# Patient Record
Sex: FEMALE | Race: WHITE | NOT HISPANIC OR LATINO | Employment: UNEMPLOYED | URBAN - METROPOLITAN AREA
[De-identification: names, ages, dates, MRNs, and addresses within clinical notes are randomized per-mention and may not be internally consistent; named-entity substitution may affect disease eponyms.]

---

## 2017-10-19 ENCOUNTER — OFFICE VISIT (OUTPATIENT)
Dept: FAMILY MEDICINE | Facility: CLINIC | Age: 22
End: 2017-10-19
Payer: COMMERCIAL

## 2017-10-19 VITALS
HEART RATE: 114 BPM | RESPIRATION RATE: 16 BRPM | TEMPERATURE: 98 F | SYSTOLIC BLOOD PRESSURE: 114 MMHG | BODY MASS INDEX: 27.82 KG/M2 | HEIGHT: 63 IN | DIASTOLIC BLOOD PRESSURE: 80 MMHG | WEIGHT: 157 LBS | OXYGEN SATURATION: 100 %

## 2017-10-19 DIAGNOSIS — F41.0 PANIC ATTACK: ICD-10-CM

## 2017-10-19 DIAGNOSIS — Z00.00 ROUTINE GENERAL MEDICAL EXAMINATION AT A HEALTH CARE FACILITY: Primary | ICD-10-CM

## 2017-10-19 DIAGNOSIS — F41.1 GAD (GENERALIZED ANXIETY DISORDER): ICD-10-CM

## 2017-10-19 DIAGNOSIS — Z11.3 SCREEN FOR STD (SEXUALLY TRANSMITTED DISEASE): ICD-10-CM

## 2017-10-19 DIAGNOSIS — F33.2 SEVERE EPISODE OF RECURRENT MAJOR DEPRESSIVE DISORDER, WITHOUT PSYCHOTIC FEATURES (H): ICD-10-CM

## 2017-10-19 DIAGNOSIS — Z12.4 SCREENING FOR CERVICAL CANCER: ICD-10-CM

## 2017-10-19 LAB
SPECIMEN SOURCE: NORMAL
WET PREP SPEC: NORMAL

## 2017-10-19 PROCEDURE — 87591 N.GONORRHOEAE DNA AMP PROB: CPT | Performed by: PHYSICIAN ASSISTANT

## 2017-10-19 PROCEDURE — 87491 CHLMYD TRACH DNA AMP PROBE: CPT | Performed by: PHYSICIAN ASSISTANT

## 2017-10-19 PROCEDURE — G0145 SCR C/V CYTO,THINLAYER,RESCR: HCPCS | Performed by: PHYSICIAN ASSISTANT

## 2017-10-19 PROCEDURE — 87210 SMEAR WET MOUNT SALINE/INK: CPT | Performed by: PHYSICIAN ASSISTANT

## 2017-10-19 PROCEDURE — 86706 HEP B SURFACE ANTIBODY: CPT | Performed by: PHYSICIAN ASSISTANT

## 2017-10-19 PROCEDURE — 86780 TREPONEMA PALLIDUM: CPT | Performed by: PHYSICIAN ASSISTANT

## 2017-10-19 PROCEDURE — 99385 PREV VISIT NEW AGE 18-39: CPT | Performed by: PHYSICIAN ASSISTANT

## 2017-10-19 PROCEDURE — 36415 COLL VENOUS BLD VENIPUNCTURE: CPT | Performed by: PHYSICIAN ASSISTANT

## 2017-10-19 PROCEDURE — 87389 HIV-1 AG W/HIV-1&-2 AB AG IA: CPT | Performed by: PHYSICIAN ASSISTANT

## 2017-10-19 PROCEDURE — 86803 HEPATITIS C AB TEST: CPT | Performed by: PHYSICIAN ASSISTANT

## 2017-10-19 ASSESSMENT — ANXIETY QUESTIONNAIRES
6. BECOMING EASILY ANNOYED OR IRRITABLE: NEARLY EVERY DAY
1. FEELING NERVOUS, ANXIOUS, OR ON EDGE: NEARLY EVERY DAY
5. BEING SO RESTLESS THAT IT IS HARD TO SIT STILL: NEARLY EVERY DAY
2. NOT BEING ABLE TO STOP OR CONTROL WORRYING: NEARLY EVERY DAY
IF YOU CHECKED OFF ANY PROBLEMS ON THIS QUESTIONNAIRE, HOW DIFFICULT HAVE THESE PROBLEMS MADE IT FOR YOU TO DO YOUR WORK, TAKE CARE OF THINGS AT HOME, OR GET ALONG WITH OTHER PEOPLE: EXTREMELY DIFFICULT
3. WORRYING TOO MUCH ABOUT DIFFERENT THINGS: NEARLY EVERY DAY
GAD7 TOTAL SCORE: 21
7. FEELING AFRAID AS IF SOMETHING AWFUL MIGHT HAPPEN: NEARLY EVERY DAY

## 2017-10-19 ASSESSMENT — PATIENT HEALTH QUESTIONNAIRE - PHQ9
5. POOR APPETITE OR OVEREATING: NEARLY EVERY DAY
SUM OF ALL RESPONSES TO PHQ QUESTIONS 1-9: 26

## 2017-10-19 NOTE — LETTER
October 24, 2017      Shona Aguilar  [address] [address]        Dear ,    We are writing to inform you of your test results.    Your recent STD screening was negative for Gonorrhea, Chlamydia, Trichomonas, HIV, Syphilis, Hepatitis C and Hepatitis B. You are also not immune to Hepatitis B and there is an immunization for that particular virus if you' d like to complete the shots.     Resulted Orders   Chlamydia trachomatis PCR   Result Value Ref Range    Specimen Description Cervix     Chlamydia Trachomatis PCR Negative NEG^Negative      Comment:      Negative for C. trachomatis rRNA by transcription mediated amplification.  A negative result by transcription mediated amplification does not preclude   the presence of C. trachomatis infection because results are dependent on   proper and adequate collection, absence of inhibitors, and sufficient rRNA to   be detected.     Neisseria gonorrhoeae PCR   Result Value Ref Range    Specimen Descrip Cervix     N Gonorrhea PCR Negative NEG^Negative      Comment:      Negative for N. gonorrhoeae rRNA by transcription mediated amplification.  A negative result by transcription mediated amplification does not preclude   the presence of N. gonorrhoeae infection because results are dependent on   proper and adequate collection, absence of inhibitors, and sufficient rRNA to   be detected.     Wet prep   Result Value Ref Range    Specimen Description Vagina     Wet Prep No Trichomonas seen     Wet Prep No clue cells seen     Wet Prep No yeast seen    HIV Antigen Antibody Combo   Result Value Ref Range    HIV Antigen Antibody Combo Nonreactive NR^Nonreactive          Comment:      HIV-1 p24 Ag & HIV-1/HIV-2 Ab Not Detected   Hepatitis C Screen Reflex to HCV RNA Quant and Genotype   Result Value Ref Range    Hepatitis C Antibody Nonreactive NR^Nonreactive      Comment:      Assay performance characteristics have not been established for newborns,    infants, and children     Hepatitis B Surface Antibody   Result Value Ref Range    Hepatitis B Surface Antibody 1.09 <8.00 m[IU]/mL      Comment:      Nonreactive, No antibody detected when the value is less than 8.00 m[IU]/mL.   Anti Treponema   Result Value Ref Range    Treponema pallidum Antibody Negative NEG^Negative       If you have any questions or concerns, please call the clinic at the number listed above.       Sincerely,        Nicole Joy Siegler, PA-C

## 2017-10-19 NOTE — LETTER
October 25, 2017      Shona Aguilar  [address] [address]    Dear ,      I am happy to inform you that your recent cervical cancer screening test (PAP smear) was normal.      Preventative screenings such as this help to ensure your health for years to come. You should repeat a pap smear in 3 years, unless otherwise directed.      You will still need to return to the clinic every year for your annual exam and other preventive tests.     Please contact the clinic at [phone] if you have further questions.       Sincerely,      Nicole Joy Siegler, PA-C/erik

## 2017-10-19 NOTE — NURSING NOTE
"Chief Complaint   Patient presents with     Physical       Initial /80  Pulse 114  Temp 98  F (36.7  C)  Resp 16  Ht 5' 3\" (1.6 m)  Wt 157 lb (71.2 kg)  SpO2 100%  BMI 27.81 kg/m2 Estimated body mass index is 27.81 kg/(m^2) as calculated from the following:    Height as of this encounter: 5' 3\" (1.6 m).    Weight as of this encounter: 157 lb (71.2 kg).  Medication Reconciliation: estuardo Hathaway CMA      "

## 2017-10-19 NOTE — PROGRESS NOTES
SUBJECTIVE:   CC: Shona Aguilar is an 22 year old woman who presents for preventive health visit.     Healthy Habits:    Do you get at least three servings of calcium containing foods daily (dairy, green leafy vegetables, etc.)? yes    Amount of exercise or daily activities, outside of work: 2 day(s) per week    Problems taking medications regularly not applicable    Medication side effects: No    Have you had an eye exam in the past two years? yes    Do you see a dentist twice per year? no    Do you have sleep apnea, excessive snoring or daytime drowsiness?no  Physical       Abnormal Mood Symptoms      Duration: ongoing for a few years off and on; recent increase in symptoms in the past few weeks.     Description:  Depression: YES  Anxiety: YES  Panic attacks: YES     Accompanying signs and symptoms: see PHQ-9 and PABLO scores    History (similar episodes/previous evaluation): hx includes treatment with medication for anxiety and depression a few years ago for a few months, then she stopped due to not liking how it made her feel. She had to leave school due to anxiety. She is currently not taking medication but symptoms have been bad enough that she is taking a leave of absence from work due to them.     Precipitating or alleviating factors: None    Therapies tried and outcome: she is unsure what she has taken.     Her PHQ 9 and PABLO 7 scores are suggestive of severe symptoms. She has seen a therapist in the past but is not currently treated. She denies hospitalization in the past for behavioral health concerns.       Today's PHQ-2 Score:   PHQ-2 ( 1999 [employer]) 10/19/2017   Q1: Little interest or pleasure in doing things Nearly every day   Q2: Feeling down, depressed or hopeless Nearly every day   PHQ-2 Score 6       Abuse: Current or Past(Physical, Sexual or Emotional)- No  Do you feel safe in your environment - Yes    Social History   Substance Use Topics     Smoking status: Never Smoker     Smokeless tobacco:  "Never Used     Alcohol use Yes     The patient does not drink >3 drinks per day nor >7 drinks per week.    Reviewed orders with patient.  Reviewed health maintenance and updated orders accordingly - Yes    Mammogram not appropriate for this patient based on age.    Pertinent mammograms are reviewed under the imaging tab.  History of abnormal Pap smear: NO - age 21-29 PAP every 3 years recommended. She is 22 years old. This will be her first pelvic exam and she is very nervous.   Patient is sexually active with 2 male partners. She is , they are polyamorous and have several partners between them.     Reviewed and updated as needed this visit by clinical staff  Tobacco  Allergies  Med Hx  Surg Hx  Fam Hx  Soc Hx        Reviewed and updated as needed this visit by Provider          Review of Systems  C: NEGATIVE for fever, chills, change in weight  I: NEGATIVE for worrisome rashes, moles or lesions  E: NEGATIVE for vision changes or irritation  ENT: NEGATIVE for ear, mouth and throat problems  R: NEGATIVE for significant cough or SOB  B: NEGATIVE for masses, tenderness or discharge  CV: NEGATIVE for chest pain, palpitations or peripheral edema  GI: NEGATIVE for nausea, abdominal pain, heartburn, or change in bowel habits  : NEGATIVE for unusual urinary or vaginal symptoms. Periods are regular.  M: NEGATIVE for significant arthralgias or myalgia  N: NEGATIVE for weakness, dizziness or paresthesias     OBJECTIVE:   /80  Pulse 114  Temp 98  F (36.7  C)  Resp 16  Ht 5' 3\" (1.6 m)  Wt 157 lb (71.2 kg)  SpO2 100%  BMI 27.81 kg/m2  Physical Exam  GENERAL: healthy, alert and no distress  EYES: Eyes grossly normal to inspection, PERRL and conjunctivae and sclerae normal  HENT: ear canals and TM's normal, nose and mouth without ulcers or lesions  NECK: no adenopathy, no asymmetry, masses, or scars and thyroid normal to palpation  RESP: lungs clear to auscultation - no rales, rhonchi or wheezes  BREAST: " normal without masses, tenderness or nipple discharge and no palpable axillary masses or adenopathy  CV: regular rate and rhythm, normal S1 S2, no S3 or S4, no murmur, click or rub, no peripheral edema and peripheral pulses strong  ABDOMEN: soft, nontender, no hepatosplenomegaly, no masses and bowel sounds normal   (female): normal female external genitalia, normal urethral meatus, vaginal mucosa pink, moist, well rugated, and normal cervix/adnexa/uterus without masses or discharge  MS: no gross musculoskeletal defects noted, no edema  SKIN: no suspicious lesions or rashes  NEURO: Normal strength and tone, mentation intact and speech normal  PSYCH: mentation appears normal, affect normal/bright    ASSESSMENT/PLAN:       ICD-10-CM    1. Routine general medical examination at a health care facility Z00.00    2. Screening for cervical cancer Z12.4    3. Screen for STD (sexually transmitted disease) Z11.3 Chlamydia trachomatis PCR     Neisseria gonorrhoeae PCR     Wet prep     Pap imaged thin layer screen only - recommended age 21 - 24 years     HIV Antigen Antibody Combo     Hepatitis C Screen Reflex to HCV RNA Quant and Genotype     Hepatitis B Surface Antibody     Anti Treponema   4. Severe episode of recurrent major depressive disorder, without psychotic features (H) F33.2    5. Panic attack F41.0    6. PABLO (generalized anxiety disorder) F41.1      We discussed screening tests above and anticipated return of test dates.   We discussed her mood symptoms; I recommended she return within a few days for further evaluation as her physical took longer than usual due to discussion of procedures as this was her first pelvic examination.   She agreed that she was not stable enough emotionally to be seen within a few days. She will also bring her medication bottles from her previous treatment so we can see what she had been on and what did or didn't work for her in the past before prescribing medication.     She has made a  "follow up for tomorrow to discuss her behavioral health symptoms and create a complete plan of care.     COUNSELING:  Reviewed preventive health counseling, as reflected in patient instructions         reports that she has never smoked. She has never used smokeless tobacco.    Estimated body mass index is 27.81 kg/(m^2) as calculated from the following:    Height as of this encounter: 5' 3\" (1.6 m).    Weight as of this encounter: 157 lb (71.2 kg).       Nicole Joy Siegler, PA-C  [facility]  "

## 2017-10-19 NOTE — MR AVS SNAPSHOT
After Visit Summary   10/19/2017    Shona Aguilar    MRN: [id number]           Patient Information     Date Of Birth          1995        Visit Information        Provider Department      10/19/2017 2:10 PM Siegler, Nicole Joy, PA-C [facility]        Today's Diagnoses     Routine general medical examination at a health care facility    -  1    Screening for cervical cancer        Screen for STD (sexually transmitted disease)          Care Instructions      Preventive Health Recommendations  Female Ages 18 to 25     Yearly exam:     See your health care provider every year in order to  o Review health changes.   o Discuss preventive care.    o Review your medicines if your doctor has prescribed any.      You should be tested each year for STDs (sexually transmitted diseases).       After age 20, talk to your provider about how often you should have cholesterol testing.      Starting at age 21, get a Pap test every three years. If you have an abnormal result, your doctor may have you test more often.      If you are at risk for diabetes, you should have a diabetes test (fasting glucose).     Shots:     Get a flu shot each year.     Get a tetanus shot every 10 years.     Consider getting the shot (vaccine) that prevents cervical cancer (Gardasil).    Nutrition:     Eat at least 5 servings of fruits and vegetables each day.    Eat whole-grain bread, whole-wheat pasta and brown rice instead of white grains and rice.    Talk to your provider about Calcium and Vitamin D.     Lifestyle    Exercise at least 150 minutes a week each week (30 minutes a day, 5 days a week). This will help you control your weight and prevent disease.    Limit alcohol to one drink per day.    No smoking.     Wear sunscreen to prevent skin cancer.    See your dentist every six months for an exam and cleaning.  Preventive Health Recommendations  Female Ages 18 to 25     Yearly exam:   See your  health care provider every year in order to  Review health changes.   Discuss preventive care.    Review your medicines if your doctor has prescribed any.    You should be tested each year for STDs (sexually transmitted diseases).     After age 20, talk to your provider about how often you should have cholesterol testing.    Starting at age 21, get a Pap test every three years. If you have an abnormal result, your doctor may have you test more often.    If you are at risk for diabetes, you should have a diabetes test (fasting glucose).     Shots:   Get a flu shot each year.   Get a tetanus shot every 10 years.   Consider getting the shot (vaccine) that prevents cervical cancer (Gardasil).    Nutrition:   Eat at least 5 servings of fruits and vegetables each day.  Eat whole-grain bread, whole-wheat pasta and brown rice instead of white grains and rice.  Talk to your provider about Calcium and Vitamin D.     Lifestyle  Exercise at least 150 minutes a week each week (30 minutes a day, 5 days a week). This will help you control your weight and prevent disease.  Limit alcohol to one drink per day.  No smoking.   Wear sunscreen to prevent skin cancer.  See your dentist every six months for an exam and cleaning.          Follow-ups after your visit        Who to contact     If you have questions or need follow up information about today's clinic visit or your schedule please contact [facility] directly at [phone].  Normal or non-critical lab and imaging results will be communicated to you by MyChart, letter or phone within 4 business days after the clinic has received the results. If you do not hear from us within 7 days, please contact the clinic through MyChart or phone. If you have a critical or abnormal lab result, we will notify you by phone as soon as possible.  Submit refill requests through [employer] or call your pharmacy and they will forward the refill request to us. Please  "allow 3 business days for your refill to be completed.          Additional Information About Your Visit        MyChart Information     [facility] lets you send messages to your doctor, view your test results, renew your prescriptions, schedule appointments and more. To sign up, go to [email or web address] . Click on \"Log in\" on the left side of the screen, which will take you to the Welcome page. Then click on \"Sign up Now\" on the right side of the page.     You will be asked to enter the access code listed below, as well as some personal information. Please follow the directions to create your username and password.     Your access code is: [email or web address]  Expires: 2018  3:11 PM     Your access code will  in 90 days. If you need help or a new code, please call your [facility] clinic or [phone].        Care EveryWhere ID     This is your Care EveryWhere ID. This could be used by other organizations to access your [facility] medical records  [id number]        Your Vitals Were     Pulse Temperature Respirations Height Pulse Oximetry BMI (Body Mass Index)    114 98  F (36.7  C) 16 5' 3\" (1.6 m) 100% 27.81 kg/m2       Blood Pressure from Last 3 Encounters:   10/19/17 114/80   03/29/15 111/72    Weight from Last 3 Encounters:   10/19/17 157 lb (71.2 kg)   03/29/15 160 lb (72.6 kg) (88 %)*     * Growth percentiles are based on CDC 2-20 Years data.              We Performed the Following     Anti Treponema     Chlamydia trachomatis PCR     Hepatitis B Surface Antibody     Hepatitis C Screen Reflex to HCV RNA Quant and Genotype     HIV Antigen Antibody Combo     Neisseria gonorrhoeae PCR     Pap imaged thin layer screen only - recommended age 21 - 24 years     Wet prep        Primary Care Provider Office Phone # Fax #    [facility] [phone] [address], [address]  [place] [facility] [address]        Equal Access to Services     TIM PHILIP AH: Coty holley " abigail Hassan, maryjo castañedaadaha, qaoliviata kageorge jennbunny, devon cecillein hayaan jennpapa paramnoemi ladyllanshauna corky. So [facility] [phone].    ATENCIÓN: Si habla español, tiene a dick disposición servicios gratuitos de asistencia lingüística. Candelario al [phone].    We comply with applicable federal civil rights laws and [place] laws. We do not discriminate on the basis of race, color, national origin, age, disability, sex, sexual orientation, or gender identity.            Thank you!     Thank you for choosing [facility]  for your care. Our goal is always to provide you with excellent care. Hearing back from our patients is one way we can continue to improve our services. Please take a few minutes to complete the written survey that you may receive in the mail after your visit with us. Thank you!             Your Updated Medication List - Protect others around you: Learn how to safely use, store and throw away your medicines at www.disposemymeds.org.          This list is accurate as of: 10/19/17  3:11 PM.  Always use your most recent med list.                   Brand Name Dispense Instructions for use Diagnosis    IBUPROFEN PO      Take 600 mg by mouth

## 2017-10-20 ENCOUNTER — OFFICE VISIT (OUTPATIENT)
Dept: FAMILY MEDICINE | Facility: CLINIC | Age: 22
End: 2017-10-20
Payer: COMMERCIAL

## 2017-10-20 VITALS
DIASTOLIC BLOOD PRESSURE: 60 MMHG | BODY MASS INDEX: 27.82 KG/M2 | WEIGHT: 157 LBS | HEART RATE: 79 BPM | SYSTOLIC BLOOD PRESSURE: 110 MMHG | OXYGEN SATURATION: 99 % | TEMPERATURE: 96.2 F | RESPIRATION RATE: 14 BRPM | HEIGHT: 63 IN

## 2017-10-20 DIAGNOSIS — F41.0 PANIC ATTACK: ICD-10-CM

## 2017-10-20 DIAGNOSIS — F41.1 GAD (GENERALIZED ANXIETY DISORDER): ICD-10-CM

## 2017-10-20 DIAGNOSIS — F33.2 SEVERE EPISODE OF RECURRENT MAJOR DEPRESSIVE DISORDER, WITHOUT PSYCHOTIC FEATURES (H): Primary | ICD-10-CM

## 2017-10-20 LAB
HBV SURFACE AB SERPL IA-ACNC: 1.09 M[IU]/ML
HCV AB SERPL QL IA: NONREACTIVE
HIV 1+2 AB+HIV1 P24 AG SERPL QL IA: NONREACTIVE
T PALLIDUM IGG+IGM SER QL: NEGATIVE

## 2017-10-20 PROCEDURE — 99215 OFFICE O/P EST HI 40 MIN: CPT | Performed by: PHYSICIAN ASSISTANT

## 2017-10-20 RX ORDER — ESCITALOPRAM OXALATE 20 MG/1
20 TABLET ORAL DAILY
Qty: 30 TABLET | Refills: 1 | Status: ON HOLD | OUTPATIENT
Start: 2017-10-20 | End: 2017-10-24

## 2017-10-20 ASSESSMENT — ANXIETY QUESTIONNAIRES: GAD7 TOTAL SCORE: 21

## 2017-10-20 NOTE — LETTER
[facility]  [address]  [place] [address]  Phone: [phone]  Fax: [phone]    October 20, 2017        Shona Aguilar  [address] [address]          To whom it may concern:    RE: Shona Aguilar    Patient was seen and treated today at our clinic today. She was started on medication today.     Please excuse her from work for one week with follow up on 10/27/2017 for recheck.     Please contact me for questions or concerns.      Sincerely,        Nicole Joy Siegler, PA-C

## 2017-10-20 NOTE — NURSING NOTE
"Chief Complaint   Patient presents with     RECHECK     /60  Pulse 79  Temp 96.2  F (35.7  C) (Tympanic)  Resp 14  Ht 5' 3\" (1.6 m)  Wt 157 lb (71.2 kg)  LMP  (LMP Unknown)  SpO2 99%  Breastfeeding? No  BMI 27.81 kg/m2 Estimated body mass index is 27.81 kg/(m^2) as calculated from the following:    Height as of this encounter: 5' 3\" (1.6 m).    Weight as of this encounter: 157 lb (71.2 kg).  BP completed using cuff size: regular   Kamilla Garner CMA    Health Maintenance Due   Topic Date Due     PEDS DTAP/TDAP (1 - Tdap) 02/20/2003     TETANUS IMMUNIZATION (SYSTEM ASSIGNED)  02/20/2014     INFLUENZA VACCINE (SYSTEM ASSIGNED)  09/01/2017     Health Maintenance reviewed at today's visit patient asked to schedule/complete:   Depression:  Patient agrees to schedule  Immunizations:  Patient agrees to schedule    "

## 2017-10-20 NOTE — MR AVS SNAPSHOT
After Visit Summary   10/20/2017    Shona Aguilar    MRN: [id number]           Patient Information     Date Of Birth          1995        Visit Information        Provider Department      10/20/2017 9:50 AM Siegler, Nicole Joy, PA-C [facility]        Today's Diagnoses     Severe episode of recurrent major depressive disorder, without psychotic features (H)    -  1    PABLO (generalized anxiety disorder)        Panic attack          Care Instructions    1) get in touch with your HR department and get me any forms that I need to fill out  2) start the lexapro; we discussed possible side effects and let me know if anything like that occurs  3) return in one week for follow up  4) call and start therapy with the referral provided or an outside location of your choice          Follow-ups after your visit        Additional Services     MENTAL HEALTH REFERRAL       Your provider has referred you to:   FM[facility]: [facility] - Counseling (Individual/Couples/Family) - [facility] [place] [phone]   [email or web address]   *Patient will be contacted by [facility] scheduling partner[facility]), to schedule an appointment.  Patients may also call [facility] to schedule.    All scheduling is subject to the client's specific insurance plan & benefits, provider/location availability, and provider clinical specialities.  Please arrive 15 minutes early for your first appointment and bring your completed paperwork.    Please be aware that coverage of these services is subject to the terms and limitations of your health insurance plan.  Call member services at your health plan with any benefit or coverage questions.                  Who to contact     If you have questions or need follow up information about today's clinic visit or your schedule please contact [facility]  "directly at [phone].  Normal or non-critical lab and imaging results will be communicated to you by Mipagarhart, letter or phone within 4 business days after the clinic has received the results. If you do not hear from us within 7 days, please contact the clinic through Mipagarhart or phone. If you have a critical or abnormal lab result, we will notify you by phone as soon as possible.  Submit refill requests through [employer] or call your pharmacy and they will forward the refill request to us. Please allow 3 business days for your refill to be completed.          Additional Information About Your Visit        MipagarharMy Health Direct Information     [employer] lets you send messages to your doctor, view your test results, renew your prescriptions, schedule appointments and more. To sign up, go to [email or web address] . Click on \"Log in\" on the left side of the screen, which will take you to the Welcome page. Then click on \"Sign up Now\" on the right side of the page.     You will be asked to enter the access code listed below, as well as some personal information. Please follow the directions to create your username and password.     Your access code is: [email or web address]  Expires: 2018  3:11 PM     Your access code will  in 90 days. If you need help or a new code, please call your [place] clinic or [phone].        Care EveryWhere ID     This is your Care EveryWhere ID. This could be used by other organizations to access your [place] medical records  [id number]        Your Vitals Were     Pulse Temperature Respirations Height Last Period Pulse Oximetry    79 96.2  F (35.7  C) (Tympanic) 14 5' 3\" (1.6 m) (LMP Unknown) 99%    Breastfeeding? BMI (Body Mass Index)                No 27.81 kg/m2           Blood Pressure from Last 3 Encounters:   10/20/17 110/60   10/19/17 114/80   03/29/15 111/72    Weight from Last 3 Encounters:   10/20/17 157 lb (71.2 kg)   10/19/17 157 lb (71.2 kg)   03/29/15 160 lb (72.6 kg) (88 %)*     * " Growth percentiles are based on [facility] 2-20 Years data.              We Performed the Following     DEPRESSION ACTION PLAN (DAP)     MENTAL HEALTH REFERRAL          Today's Medication Changes          These changes are accurate as of: 10/20/17 10:45 AM.  If you have any questions, ask your nurse or doctor.               Start taking these medicines.        Dose/Directions    escitalopram 20 MG tablet   Commonly known as:  LEXAPRO   Used for:  Severe episode of recurrent major depressive disorder, without psychotic features (H), PABLO (generalized anxiety disorder), Panic attack   Started by:  Siegler, Nicole Joy, PA-C        Dose:  20 mg   Take 1 tablet (20 mg) by mouth daily Start with 1/2 tab for 2 weeks, then increase to 1 tab.   Quantity:  30 tablet   Refills:  1            Where to get your medicines      These medications were sent to [facility] [address] [address]     Phone:  [phone]     escitalopram 20 MG tablet                Primary Care Provider Office Phone # Fax #    [facility] [phone]       [address] [place] [address], [address]  [facility] [id number]        Equal Access to Services     TIM PHILIP : Hadii kalani ku hadasho Soomaali, waaxda luqadaha, qaybta kaalmada adeegyada, devon vogel hayjanes maldonado . So [facility] [phone].    ATENCIÓN: Si habla español, tiene a dick disposición servicios gratuitos de asistencia lingüística. Davidame al [phone].    We comply with applicable federal civil rights laws and [place] laws. We do not discriminate on the basis of race, color, national origin, age, disability, sex, sexual orientation, or gender identity.            Thank you!     Thank you for choosing [facility]  for your care. Our goal is always to provide you with excellent care. Hearing back from our patients is one way we can continue to  improve our services. Please take a few minutes to complete the written survey that you may receive in the mail after your visit with us. Thank you!             Your Updated Medication List - Protect others around you: Learn how to safely use, store and throw away your medicines at www.disposemymeds.org.          This list is accurate as of: 10/20/17 10:45 AM.  Always use your most recent med list.                   Brand Name Dispense Instructions for use Diagnosis    escitalopram 20 MG tablet    LEXAPRO    30 tablet    Take 1 tablet (20 mg) by mouth daily Start with 1/2 tab for 2 weeks, then increase to 1 tab.    Severe episode of recurrent major depressive disorder, without psychotic features (H), PABLO (generalized anxiety disorder), Panic attack       IBUPROFEN PO      Take 600 mg by mouth
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
## 2017-10-20 NOTE — PATIENT INSTRUCTIONS
1) get in touch with your HR department and get me any forms that I need to fill out  2) start the lexapro; we discussed possible side effects and let me know if anything like that occurs  3) return in one week for follow up  4) call and start therapy with the referral provided or an outside location of your choice

## 2017-10-22 LAB
C TRACH DNA SPEC QL NAA+PROBE: NEGATIVE
N GONORRHOEA DNA SPEC QL NAA+PROBE: NEGATIVE
SPECIMEN SOURCE: NORMAL
SPECIMEN SOURCE: NORMAL

## 2017-10-23 ENCOUNTER — HOSPITAL ENCOUNTER (INPATIENT)
Facility: CLINIC | Age: 22
LOS: 1 days | Discharge: HOME | DRG: 882 | End: 2017-10-24
Attending: EMERGENCY MEDICINE | Admitting: PSYCHIATRY & NEUROLOGY
Payer: COMMERCIAL

## 2017-10-23 ENCOUNTER — NURSE TRIAGE (OUTPATIENT)
Dept: NURSING | Facility: CLINIC | Age: 22
End: 2017-10-23

## 2017-10-23 DIAGNOSIS — F41.1 GAD (GENERALIZED ANXIETY DISORDER): ICD-10-CM

## 2017-10-23 DIAGNOSIS — F41.0 PANIC ATTACK: ICD-10-CM

## 2017-10-23 DIAGNOSIS — R41.89 DISORGANIZED THINKING: ICD-10-CM

## 2017-10-23 DIAGNOSIS — F33.2 SEVERE EPISODE OF RECURRENT MAJOR DEPRESSIVE DISORDER, WITHOUT PSYCHOTIC FEATURES (H): ICD-10-CM

## 2017-10-23 LAB
COPATH REPORT: NORMAL
PAP: NORMAL

## 2017-10-23 PROCEDURE — 99284 EMERGENCY DEPT VISIT MOD MDM: CPT | Mod: Z6 | Performed by: EMERGENCY MEDICINE

## 2017-10-23 PROCEDURE — 90791 PSYCH DIAGNOSTIC EVALUATION: CPT

## 2017-10-23 PROCEDURE — 99285 EMERGENCY DEPT VISIT HI MDM: CPT | Mod: 25

## 2017-10-23 NOTE — IP AVS SNAPSHOT
MRN:[id number]                      After Visit Summary   10/23/2017    Shona Aguilar    MRN: [id number]           Thank you!     Thank you for choosing [facility] for your care. Our goal is always to provide you with excellent care.        Patient Information     Date Of Birth          1995        About your hospital stay     You were admitted on:  October 24, 2017 You last received care in the:  [facility]    You were discharged on:  October 24, 2017        Reason for your hospital stay       Sudafed use leading to confusion, OCD                  Who to Call     For medical emergencies, please call 911.  For non-urgent questions about your medical care, please call your primary care provider or clinic, [phone]          Attending Provider     Provider Specialty    Geri Campbell MD Emergency Medicine    [facility], Vineet Ayala MD Psychiatry       Primary Care Provider Office Phone # Fax #    [facility] [phone]      After Care Instructions     Activity       Your activity upon discharge: activity as tolerated            Diet       Follow this diet upon discharge: Orders Placed This Encounter      Regular Diet Adult            Discharge Instructions       1. Please do not harm yourself or others.  2. Please continue to take your medications.  3. Please follow up with your outpatient care team.  4. Please do not take drugs or alcohol as this will worsen your condition.  5. Please do not take more than the prescribed doses of medications as this may make them dangerous.   6. Please follow your safety plan of action.  7. Please call crisis if having trouble.  8. If having thoughts of harming self or others please come in to the emergency department as soon as possible.                  Your next 10 appointments already scheduled     Oct 27, 2017 10:30 AM CDT   SHORT with Nicole Joy Siegler, PA-C   [facility]  [place] ([facility])    [address]  [address]  [facility] [id number] [phone]              Further instructions from your care team        Behavioral Discharge Planning and Instructions      Summary:  You were admitted on 10/23/2017  due to Obsessive Compulsive Disorder (OCD) and Substance Induced Psychosis.  You were treated by Dr. Vineet Rosa MD and discharged on 10/24/17 from [facility]      Principal Diagnosis: substance induced delirium, OCD      Health Care Follow-up Appointments:   [facility]  [phone]  FAX: [phone]  [address]  Medication Management: Geneva Salmeron November 29th @ 10:00  Therapy: Isma Velasco October 30th @ 2:30      Attend all scheduled appointments with your outpatient providers. Call at least 24 hours in advance if you need to reschedule an appointment to ensure continued access to your outpatient providers.   Major Treatments, Procedures and Findings:  You were provided with: a psychiatric assessment and medication evaluation and/or management    Symptoms to Report: feeling more aggressive, increased confusion, losing more sleep, mood getting worse or thoughts of suicide    Early warning signs can include: increased depression or anxiety sleep disturbances increased thoughts or behaviors of suicide or self-harm  increased unusual thinking, such as paranoia or hearing voices    Safety and Wellness:  Take all medicines as directed.  Make no changes unless your doctor suggests them.      Follow treatment recommendations.  Refrain from alcohol and non-prescribed drugs.  If there is a concern for safety, call 911.    Resources:   Crisis Intervention: 116.765.7515 or 641-423-5623 (TTY: 581.513.1729).  Call anytime for help.  National [place] on Mental Illness (www.[place].jamie.org): 272.209.6175 or 458-974-3756.  Mental Health Association of [place] (www.mentalhealth.org):  "531.292.8113 or 378-079-6513  [facility] (COPE) Response - Adult [phone]    The treatment team has appreciated the opportunity to work with you.     If you have any questions or concerns our unit number is [phone].  You may be receiving a follow-up phone call within the next three days from a representative from behavioral health.          Pending Results     No orders found for last 3 day(s).            Statement of Approval     Ordered          10/24/17 1140  I have reviewed and agree with all the recommendations and orders detailed in this document.  EFFECTIVE NOW     Approved and electronically signed by:  Vineet Rosa MD             Admission Information     Date & Time Provider Department Dept. Phone    10/23/2017 Vineet Rosa MD [facility] [phone]      Your Vitals Were     Blood Pressure Pulse Temperature Respirations Height Weight    134/85 75 98.7  F (37.1  C) (Oral) 16 1.6 m (5' 3\") 71.2 kg (157 lb)    Last Period Pulse Oximetry BMI (Body Mass Index)             (LMP Unknown) 97% 27.81 kg/m2         MyChart Information     [employer] lets you send messages to your doctor, view your test results, renew your prescriptions, schedule appointments and more. To sign up, go to [email or web address] . Click on \"Log in\" on the left side of the screen, which will take you to the Welcome page. Then click on \"Sign up Now\" on the right side of the page.     You will be asked to enter the access code listed below, as well as some personal information. Please follow the directions to create your username and password.     Your access code is: [email or web address]  Expires: 2018  3:11 PM     Your access code will  in 90 days. If you need help or a new code, please call your [place] clinic or [phone].        Care EveryWhere ID     This is your Care EveryWhere ID. This could be used by other organizations to access your [place] medical records  [id number]   "      Equal Access to Services     [facility]: Hadii aad ku hadasterelvis Sovarghese, waaxda luqadaha, qaybta kaalmaregina lugo, devon fried. So [facility] [phone].    ATENCIÓN: Si habla español, tiene a dick disposición servicios gratuitos de asistencia lingüística. Llame al [phone].    We comply with applicable federal civil rights laws and [place] laws. We do not discriminate on the basis of race, color, national origin, age, disability, sex, sexual orientation, or gender identity.               Review of your medicines      CONTINUE these medicines which have NOT CHANGED        Dose / Directions    escitalopram 20 MG tablet   Commonly known as:  LEXAPRO   Used for:  Severe episode of recurrent major depressive disorder, without psychotic features (H), PABLO (generalized anxiety disorder), Panic attack        Dose:  20 mg   Take 1 tablet (20 mg) by mouth daily Start with 1/2 tab for 2 weeks, then increase to 1 tab.   Quantity:  30 tablet   Refills:  1       IBUPROFEN PO        Dose:  400 mg   Take 400 mg by mouth   Refills:  0         STOP taking     SUDAFED PO                    Protect others around you: Learn how to safely use, store and throw away your medicines at www.disposemymeds.org.             Medication List: This is a list of all your medications and when to take them. Check marks below indicate your daily home schedule. Keep this list as a reference.      Medications           Morning Afternoon Evening Bedtime As Needed    escitalopram 20 MG tablet   Commonly known as:  LEXAPRO   Take 1 tablet (20 mg) by mouth daily Start with 1/2 tab for 2 weeks, then increase to 1 tab.                                IBUPROFEN PO   Take 400 mg by mouth

## 2017-10-23 NOTE — LETTER
UR 20NB  [address]  [facility] [place] [address]  [phone]    2017    Re: Shona Aguilar  [address]  [facility] [address]  [phone] (home)     : 1995      To Whom It May Concern:      Shona Aguilar was hospitalized from 10/23/2017 until 10/23/2017 due to medical illness.  She may return to work when cleared at outpatient follow up with restrictions to be determined by outpatient physician at follow up.        Sincerely,        Vineet Hoang MD

## 2017-10-23 NOTE — IP AVS SNAPSHOT
[address] [address]    Phone:  [phone]                                       After Visit Summary   10/23/2017    Shona Aguilar    MRN: [id number]           After Visit Summary Signature Page     I have received my discharge instructions, and my questions have been answered. I have discussed any challenges I see with this plan with the nurse or doctor.    ..........................................................................................................................................  Patient/Patient Representative Signature      ..........................................................................................................................................  Patient Representative Print Name and Relationship to Patient    ..................................................               ................................................  Date                                            Time    ..........................................................................................................................................  Reviewed by Signature/Title    ...................................................              ..............................................  Date                                                            Time

## 2017-10-24 VITALS
WEIGHT: 157 LBS | OXYGEN SATURATION: 97 % | BODY MASS INDEX: 27.82 KG/M2 | DIASTOLIC BLOOD PRESSURE: 85 MMHG | SYSTOLIC BLOOD PRESSURE: 134 MMHG | HEART RATE: 75 BPM | RESPIRATION RATE: 16 BRPM | TEMPERATURE: 97.1 F | HEIGHT: 63 IN

## 2017-10-24 LAB
ANION GAP SERPL CALCULATED.3IONS-SCNC: 8 MMOL/L (ref 3–14)
BASOPHILS # BLD AUTO: 0 10E9/L (ref 0–0.2)
BASOPHILS NFR BLD AUTO: 0.3 %
BUN SERPL-MCNC: 14 MG/DL (ref 7–30)
CALCIUM SERPL-MCNC: 9.3 MG/DL (ref 8.5–10.1)
CHLORIDE SERPL-SCNC: 109 MMOL/L (ref 94–109)
CO2 SERPL-SCNC: 26 MMOL/L (ref 20–32)
CREAT SERPL-MCNC: 0.71 MG/DL (ref 0.52–1.04)
DIFFERENTIAL METHOD BLD: NORMAL
EOSINOPHIL # BLD AUTO: 0.2 10E9/L (ref 0–0.7)
EOSINOPHIL NFR BLD AUTO: 2.4 %
ERYTHROCYTE [DISTWIDTH] IN BLOOD BY AUTOMATED COUNT: 11.4 % (ref 10–15)
GFR SERPL CREATININE-BSD FRML MDRD: >90 ML/MIN/1.7M2
GLUCOSE SERPL-MCNC: 93 MG/DL (ref 70–99)
HCT VFR BLD AUTO: 42.2 % (ref 35–47)
HGB BLD-MCNC: 14.2 G/DL (ref 11.7–15.7)
IMM GRANULOCYTES # BLD: 0 10E9/L (ref 0–0.4)
IMM GRANULOCYTES NFR BLD: 0 %
LYMPHOCYTES # BLD AUTO: 3.3 10E9/L (ref 0.8–5.3)
LYMPHOCYTES NFR BLD AUTO: 44.6 %
MCH RBC QN AUTO: 30.1 PG (ref 26.5–33)
MCHC RBC AUTO-ENTMCNC: 33.6 G/DL (ref 31.5–36.5)
MCV RBC AUTO: 89 FL (ref 78–100)
MONOCYTES # BLD AUTO: 0.5 10E9/L (ref 0–1.3)
MONOCYTES NFR BLD AUTO: 6.6 %
NEUTROPHILS # BLD AUTO: 3.4 10E9/L (ref 1.6–8.3)
NEUTROPHILS NFR BLD AUTO: 46.1 %
NRBC # BLD AUTO: 0 10*3/UL
NRBC BLD AUTO-RTO: 0 /100
PLATELET # BLD AUTO: 286 10E9/L (ref 150–450)
POTASSIUM SERPL-SCNC: 3.5 MMOL/L (ref 3.4–5.3)
RBC # BLD AUTO: 4.72 10E12/L (ref 3.8–5.2)
SODIUM SERPL-SCNC: 143 MMOL/L (ref 133–144)
WBC # BLD AUTO: 7.4 10E9/L (ref 4–11)

## 2017-10-24 PROCEDURE — 12400007 ZZH R&B MH INTERMEDIATE UMMC

## 2017-10-24 PROCEDURE — 85025 COMPLETE CBC W/AUTO DIFF WBC: CPT | Performed by: EMERGENCY MEDICINE

## 2017-10-24 PROCEDURE — 99236 HOSP IP/OBS SAME DATE HI 85: CPT | Performed by: PSYCHIATRY & NEUROLOGY

## 2017-10-24 PROCEDURE — 25000132 ZZH RX MED GY IP 250 OP 250 PS 637: Performed by: EMERGENCY MEDICINE

## 2017-10-24 PROCEDURE — 80048 BASIC METABOLIC PNL TOTAL CA: CPT | Performed by: EMERGENCY MEDICINE

## 2017-10-24 PROCEDURE — 99207 ZZC CDG-CODE CATEGORY CHANGED: CPT | Performed by: PSYCHIATRY & NEUROLOGY

## 2017-10-24 RX ORDER — OLANZAPINE 10 MG/1
10 TABLET, ORALLY DISINTEGRATING ORAL
Status: DISCONTINUED | OUTPATIENT
Start: 2017-10-24 | End: 2017-10-24 | Stop reason: HOSPADM

## 2017-10-24 RX ORDER — HYDROXYZINE HYDROCHLORIDE 25 MG/1
25-50 TABLET, FILM COATED ORAL EVERY 4 HOURS PRN
Status: DISCONTINUED | OUTPATIENT
Start: 2017-10-24 | End: 2017-10-24 | Stop reason: HOSPADM

## 2017-10-24 RX ORDER — DIPHENHYDRAMINE HCL 25 MG
25 CAPSULE ORAL ONCE
Status: COMPLETED | OUTPATIENT
Start: 2017-10-24 | End: 2017-10-24

## 2017-10-24 RX ORDER — ESCITALOPRAM OXALATE 20 MG/1
20 TABLET ORAL DAILY
Qty: 30 TABLET | Refills: 0 | Status: SHIPPED | OUTPATIENT
Start: 2017-10-24 | End: 2020-02-03

## 2017-10-24 RX ORDER — OLANZAPINE 10 MG/2ML
10 INJECTION, POWDER, FOR SOLUTION INTRAMUSCULAR
Status: DISCONTINUED | OUTPATIENT
Start: 2017-10-24 | End: 2017-10-24 | Stop reason: HOSPADM

## 2017-10-24 RX ADMIN — DIPHENHYDRAMINE HYDROCHLORIDE 25 MG: 25 CAPSULE ORAL at 03:43

## 2017-10-24 ASSESSMENT — ACTIVITIES OF DAILY LIVING (ADL)
ORAL_HYGIENE: INDEPENDENT
DRESS: STREET CLOTHES;INDEPENDENT
GROOMING: INDEPENDENT

## 2017-10-24 ASSESSMENT — ENCOUNTER SYMPTOMS
SHORTNESS OF BREATH: 0
CONFUSION: 1
ABDOMINAL PAIN: 0
FEVER: 0
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
## 2017-10-24 NOTE — TELEPHONE ENCOUNTER
"  Reason for Disposition    Seeing, hearing, or feeling things that are not there (i.e., visual, auditory, or tactile hallucinations)      Isaiah calling:  \"I think she is having some type of breakdown\".  He reports that Shona started pulling her hair out about a half hour ago and she thinks it is falling out.  He also reports that \"she doesn't think anything is real, she thinks it's all simulated\".    Additional Information    Negative: [1] Difficult to awaken or acting confused (disoriented, slurred speech) AND [2] present now AND [3] diabetic    Negative: [1] Difficult to awaken or acting confused (disoriented, slurred speech) AND [2] present now AND [3] new onset    Negative: [1] Weakness of the face, arm, or leg on one side of the body AND [2] new onset    Negative: [1] Numbness of the face, arm, or leg on one side of the body AND [2] new onset    Negative: [1] Loss of speech or garbled speech AND [2] new onset    Negative: Difficulty breathing or bluish lips    Negative: Shock suspected (e.g., cold/pale/clammy skin, too weak to stand, low BP, rapid pulse)    Protocols used: CONFUSION - DELIRIUM-ADULT-      Betty Farrar RN  [facility]      "
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
## 2017-10-24 NOTE — PROGRESS NOTES
10/24/17 0433   Patient Belongings   Did you bring any home meds/supplements to the hospital?  No   Patient Belongings clothing;shoes;glasses;keys;money (see comment)   Disposition of Belongings Sent to security per site process;Other (see comment)  (Patient belonging keep in locker #11. Money sent to [facility] safe.)   Belongings Search Yes   Second Staff Hattie and Frederick mendiola PA   A               Admission:  I am responsible for any personal items that are not sent to the safe or pharmacy.  [facility] is not responsible for loss, theft or damage of any property in my possession.    Signature:  _________________________________ Date: _______  Time: _____                                              Staff Signature:  ____________________________ Date: ________  Time: _____      2nd Staff person, if patient is unable/unwilling to sign:    Signature: ________________________________ Date: ________  Time: _____     Discharge:  [facility] has returned all of my personal belongings:    Signature: _________________________________ Date: ________  Time: _____                                          Staff Signature:  ____________________________ Date: ________  Time: _____

## 2017-10-24 NOTE — DISCHARGE SUMMARY
[facility], Portal   Psychiatric Discharge Summary      Shona Aguilar MRN# [id number]   Age: 22 year old YOB: 1995     Date of Admission:  10/23/2017  Date of Discharge:  10/24/17  Admitting Physician:  Vineet Hoang  Discharge Physician:  Vineet Hoang         Event Leading to Hospitalization and Hospital Course:   Shona Aguilar was admitted for Sudafed induced psychosis.       Same day admission and discharge           DIagnoses:   Please see H&P         Labs:     Recent Results (from the past 24 hour(s))   CBC with platelets differential    Collection Time: 10/24/17  3:53 AM   Result Value Ref Range    WBC 7.4 4.0 - 11.0 10e9/L    RBC Count 4.72 3.8 - 5.2 10e12/L    Hemoglobin 14.2 11.7 - 15.7 g/dL    Hematocrit 42.2 35.0 - 47.0 %    MCV 89 78 - 100 fl    MCH 30.1 26.5 - 33.0 pg    MCHC 33.6 31.5 - 36.5 g/dL    RDW 11.4 10.0 - 15.0 %    Platelet Count 286 150 - 450 10e9/L    Diff Method Automated Method     % Neutrophils 46.1 %    % Lymphocytes 44.6 %    % Monocytes 6.6 %    % Eosinophils 2.4 %    % Basophils 0.3 %    % Immature Granulocytes 0.0 %    Nucleated RBCs 0 0 /100    Absolute Neutrophil 3.4 1.6 - 8.3 10e9/L    Absolute Lymphocytes 3.3 0.8 - 5.3 10e9/L    Absolute Monocytes 0.5 0.0 - 1.3 10e9/L    Absolute Eosinophils 0.2 0.0 - 0.7 10e9/L    Absolute Basophils 0.0 0.0 - 0.2 10e9/L    Abs Immature Granulocytes 0.0 0 - 0.4 10e9/L    Absolute Nucleated RBC 0.0    Basic metabolic panel    Collection Time: 10/24/17  3:53 AM   Result Value Ref Range    Sodium 143 133 - 144 mmol/L    Potassium 3.5 3.4 - 5.3 mmol/L    Chloride 109 94 - 109 mmol/L    Carbon Dioxide 26 20 - 32 mmol/L    Anion Gap 8 3 - 14 mmol/L    Glucose 93 70 - 99 mg/dL    Urea Nitrogen 14 7 - 30 mg/dL    Creatinine 0.71 0.52 - 1.04 mg/dL    GFR Estimate >90 >60 mL/min/1.7m2    GFR Estimate If Black >90 >60 mL/min/1.7m2    Calcium 9.3 8.5 - 10.1 mg/dL            Consults:   No  consultations were requested during this admission           Discharge Medications:        Review of your medicines      CONTINUE these medicines which have NOT CHANGED       Dose / Directions    escitalopram 20 MG tablet   Commonly known as:  LEXAPRO   Used for:  Severe episode of recurrent major depressive disorder, without psychotic features (H), PABLO (generalized anxiety disorder), Panic attack        Dose:  20 mg   Take 1 tablet (20 mg) by mouth daily Start with 1/2 tab for 2 weeks, then increase to 1 tab.   Quantity:  30 tablet   Refills:  0       IBUPROFEN PO        Dose:  400 mg   Take 400 mg by mouth   Refills:  0         STOP taking          SUDAFED PO                Where to get your medicines      These medications were sent to [facility] [place], [place] - [address]  [address], [facility] [address]     Phone:  [phone]      escitalopram 20 MG tablet                  Mental Status Examination:   Please see H&P         Discharge Plan:     Reason for your hospital stay   Sudafed use leading to confusion, OCD     Activity   Your activity upon discharge: activity as tolerated     Discharge Instructions   1. Please do not harm yourself or others.  2. Please continue to take your medications.  3. Please follow up with your outpatient care team.  4. Please do not take drugs or alcohol as this will worsen your condition.  5. Please do not take more than the prescribed doses of medications as this may make them dangerous.   6. Please follow your safety plan of action.  7. Please call crisis if having trouble.  8. If having thoughts of harming self or others please come in to the emergency department as soon as possible.     Full Code     Diet   Follow this diet upon discharge: Orders Placed This Encounter     Regular Diet Adult             Further instructions from your care team        Behavioral Discharge Planning and Instructions      Summary:  You were admitted on  10/23/2017  due to Obsessive Compulsive Disorder (OCD) and Substance Induced Psychosis.  You were treated by Dr. Vineet Rosa MD and discharged on 10/24/17 from [facility]      Principal Diagnosis: substance induced delirium, OCD      Health Care Follow-up Appointments:   [facility]  [phone]  FAX: [phone]  [address] [facility]  Medication Management: Geneva Patelwater November 29th @ 10:00  Therapy: Isma Chaidezy October 30th @ 2:30      Attend all scheduled appointments with your outpatient providers. Call at least 24 hours in advance if you need to reschedule an appointment to ensure continued access to your outpatient providers.   Major Treatments, Procedures and Findings:  You were provided with: a psychiatric assessment and medication evaluation and/or management    Symptoms to Report: feeling more aggressive, increased confusion, losing more sleep, mood getting worse or thoughts of suicide    Early warning signs can include: increased depression or anxiety sleep disturbances increased thoughts or behaviors of suicide or self-harm  increased unusual thinking, such as paranoia or hearing voices    Safety and Wellness:  Take all medicines as directed.  Make no changes unless your doctor suggests them.      Follow treatment recommendations.  Refrain from alcohol and non-prescribed drugs.  If there is a concern for safety, call 911.    Resources:   Crisis Intervention: 219.839.2773 or 642-748-3355 (TTY: 948.751.3222).  Call anytime for help.  National [place] on Mental Illness (www.[place].jamie.org): 221.671.5474 or 025-691-5835.  Mental Health Association of [place] (www.mentalhealth.org): 800.155.2140 or 870-488-4156  [facility] Crisis (COPE) Response - Adult [phone]    The treatment team has appreciated the opportunity to work with you.     If you have any questions or concerns our unit number is [phone].  You may be receiving a follow-up phone call  within the next three days from a representative from behavioral health.                Please send copy to Geneva Salmeron     During hospitalizations patients have perpetuating, complicating, situational, acute, and chronic conditions that lead to risk for suicidality or homicidality. During hospitalizations we mitigate any modifiable risk factors that may have been identified in the history and physical examination and throughout the hospitalization. Chronic non-modifiable risk factors are not able to be changed with acute hospitalization. As a patient that is discharging these risk factors have been modified as much as possible and a supportive network and safety plan has been put in place. Further modifications and assistance will have to be obtained in the outpatient setting and the inpatient hospitalization team is no longer responsible for outcomes.    Vineet Hoang  [facility]      The following document has been created with voice recognition software and may contain unintentional word substitutions.

## 2017-10-24 NOTE — PROGRESS NOTES
Initial Psychosocial Assessment    I have reviewed the chart, met with the patient, and developed Care Plan. Information for assessment was obtained from: Pt, medical record                                                             voluntary    Presenting Problem:   Pt was brought in by her  for altered mental status. She was recently prescribed Lexapro and was taking adderal. Pt reported paranoia towards co-workers thinking there was cameras in each room she cleaned each room. Pt is a house keeper at a hotel.    After meeting with Dr. Rosa and denying any symptoms she will be discharged.        History of Mental Health and Chemical Dependency:  First hospitalization    Denies any substance issues      Significant Life Events   (Illness, Abuse, Trauma, Death): yes but didn't want to talk about it again    Family: Grew up in [place], parents  when pt was 14. Has two older brothers, , no children    Living Situation: lives with  of two months      Educational Background: HS grad, some college classes       Financial Status: [facility]    Legal Issues:  none    Ethnic/Cultural Issues: no issues    Spiritual Orientation:  Sikh      Service History: none    Social Functioning (organization, interests): play card games with friends    Current Treatment Providers are:  Pt has new providers with [facility]      Social Service Assessment/Plan:   Provide psychological assessment  [facility] to provide referrals for follow up

## 2017-10-24 NOTE — ED NOTES
"Per pt she has been recently started on Lexapro, at around 7 PM tonight, \"felt nothing is real\" Pt denies any SI/HI. Per pt she wants to be checked if this \"feeling \" is from her new med Lexapro. Per pt around 7:30 PM she had a panic attack.   "

## 2017-10-24 NOTE — DISCHARGE INSTRUCTIONS
Behavioral Discharge Planning and Instructions      Summary:  You were admitted on 10/23/2017  due to Obsessive Compulsive Disorder (OCD) and Substance Induced Psychosis.  You were treated by Dr. Vineet Rosa MD and discharged on 10/24/17 from [facility]      Principal Diagnosis: substance induced delirium, OCD      Health Care Follow-up Appointments:   [facility]  [phone]  FAX: [phone]  [address] [facility]  Medication Management: Geneva Salmeron November 29th @ 10:00  Therapy: Isma Velasco October 30th @ 2:30      Attend all scheduled appointments with your outpatient providers. Call at least 24 hours in advance if you need to reschedule an appointment to ensure continued access to your outpatient providers.   Major Treatments, Procedures and Findings:  You were provided with: a psychiatric assessment and medication evaluation and/or management    Symptoms to Report: feeling more aggressive, increased confusion, losing more sleep, mood getting worse or thoughts of suicide    Early warning signs can include: increased depression or anxiety sleep disturbances increased thoughts or behaviors of suicide or self-harm  increased unusual thinking, such as paranoia or hearing voices    Safety and Wellness:  Take all medicines as directed.  Make no changes unless your doctor suggests them.      Follow treatment recommendations.  Refrain from alcohol and non-prescribed drugs.  If there is a concern for safety, call 911.    Resources:   Crisis Intervention: 986.331.2720 or 715-654-2069 (TTY: 810.859.3282).  Call anytime for help.  National [place] on Mental Illness (www.[place].jamie.org): 719.388.2203 or 301-984-5116.  Mental Health Association of [place] (www.mentalhealth.org): 401.929.3735 or 916-824-5660  [facility] Crisis (COPE) Response - Adult [phone]    The treatment team has appreciated the opportunity to work with you.     If you have any questions or  concerns our unit number is [phone].  You may be receiving a follow-up phone call within the next three days from a representative from behavioral health.

## 2017-10-27 ENCOUNTER — OFFICE VISIT (OUTPATIENT)
Dept: FAMILY MEDICINE | Facility: CLINIC | Age: 22
End: 2017-10-27
Payer: COMMERCIAL

## 2017-10-27 VITALS
OXYGEN SATURATION: 99 % | SYSTOLIC BLOOD PRESSURE: 100 MMHG | HEIGHT: 63 IN | HEART RATE: 71 BPM | WEIGHT: 157 LBS | RESPIRATION RATE: 20 BRPM | TEMPERATURE: 96.8 F | DIASTOLIC BLOOD PRESSURE: 70 MMHG | BODY MASS INDEX: 27.82 KG/M2

## 2017-10-27 DIAGNOSIS — F33.2 SEVERE EPISODE OF RECURRENT MAJOR DEPRESSIVE DISORDER, WITHOUT PSYCHOTIC FEATURES (H): Primary | ICD-10-CM

## 2017-10-27 DIAGNOSIS — F41.1 GAD (GENERALIZED ANXIETY DISORDER): ICD-10-CM

## 2017-10-27 PROCEDURE — 99213 OFFICE O/P EST LOW 20 MIN: CPT | Performed by: PHYSICIAN ASSISTANT

## 2017-10-27 NOTE — PATIENT INSTRUCTIONS
Continue to monitor your symptoms  Be seen by psychiatry- call me and let me know the name and date/time of the appointment.   Keep your appointment with therapy also  Return to the ER if you have any worsening or new symptoms

## 2017-10-27 NOTE — PROGRESS NOTES
SUBJECTIVE:   Shona Aguilar is a 22 year old female who presents to clinic today for the following health issues:    Medication Followup of Lexapro      Taking Medication as prescribed: yes    Side Effects:  Fatigue    Medication Helping Symptoms:  not applicable     Depression and Anxiety Follow-Up      Status since last visit: No change    Other associated symptoms:None    Complicating factors:     Significant life event: No     Current substance abuse: None    PHQ-9 Score and MyChart F/U Questions 10/19/2017   Total Score 26   Q9: Suicide Ideation Nearly every day   F/U: Thoughts of suicide or self harm? Yes   F/U: Plan or intention for self harm? No   F/U: Acted on thoughts? No   F/U: Safety concerns for self or others? No     PABLO-7 SCORE 10/19/2017   Total Score 21     In the past two weeks have you had thoughts of suicide or self-harm?  Yes  In the past two weeks have you thought of a plan or intent to harm yourself? No.  Do you have concerns about your personal safety or the safety of others?   No      PHQ-9  English  PHQ-9   Any Language  GAD7  Suicide Assessment Five-step Evaluation and Treatment (SAFE-T)      Amount of exercise or physical activity: None    Problems taking medications regularly: No    Medication side effects: none    Diet: regular (no restrictions)    Shona was seen 10/23/2017 in the ED for disorganized thinking and panic attack, clinical assumption in the hospital was that she had these symptoms due to use of sudafed. She has since stopped taking that medication. She was not held as an inpatient and her medication was not changed. She is scheduled for follow up with a psychiatrist and therapy however that information is not confirmed during our visit.     Problem list and histories reviewed & adjusted, as indicated.  Additional history: as documented      Reviewed and updated as needed this visit by clinical staffTobacco  Allergies  Meds  Problems  Med Hx  Surg Hx  Fam Hx  Soc  "Hx        Reviewed and updated as needed this visit by Provider         ROS:  Constitutional, HEENT, cardiovascular, pulmonary, gi and gu systems are negative, except as otherwise noted.      OBJECTIVE:   /70  Pulse 71  Temp 96.8  F (36  C) (Tympanic)  Resp 20  Ht 5' 3\" (1.6 m)  Wt 157 lb (71.2 kg)  LMP  (LMP Unknown)  SpO2 99%  Breastfeeding? No  BMI 27.81 kg/m2  Body mass index is 27.81 kg/(m^2).  GENERAL: healthy, alert and no distress  Mental Status Exam  Behavior: cooperative and calm  Speech: normal rate and rhythm  Mood: anxious  Affect: Blunted/Flat  Thought Processes: Logical  Thought Content: no overt psychosis and patient does not appear to be responding to internal stimuli  She denies suicidal intent or plan today.   Insight: Fair  Judgment: Adequate for safety      Diagnostic Test Results:  none     ASSESSMENT/PLAN:       ICD-10-CM    1. Severe episode of recurrent major depressive disorder, without psychotic features (H) F33.2    2. PABLO (generalized anxiety disorder) F41.1        Her overall symptoms of anxiety and depression have not improved since our meeting last week. I did not expect them to improve much in one week and she and I discussed that in depth last week and today. She did, however, have an upset which if due to use of stimulant medication should not occur again, though if due to other psychiatric factors would constitute a need for psychiatric follow up. I recommended she have psychiatric follow up and she will confirms she has an appropriate appointment by calling me back and providing me the details. If she does not, I will refer her to psychiatry. She agrees.     15 total minutes spent with the patient, > 50% face to face discussing the patients concerns and addressing questions in the above assessment and plan.         Nicole Joy Siegler, PA-C  [facility]    "

## 2017-10-27 NOTE — NURSING NOTE
"Chief Complaint   Patient presents with     RECHECK     /70  Pulse 71  Temp 96.8  F (36  C) (Tympanic)  Resp 20  Ht 5' 3\" (1.6 m)  Wt 157 lb (71.2 kg)  LMP  (LMP Unknown)  SpO2 99%  Breastfeeding? No  BMI 27.81 kg/m2 Estimated body mass index is 27.81 kg/(m^2) as calculated from the following:    Height as of this encounter: 5' 3\" (1.6 m).    Weight as of this encounter: 157 lb (71.2 kg).  BP completed using cuff size: regular   Kamilla Garner CMA    Health Maintenance Due   Topic Date Due     PEDS DTAP/TDAP (1 - Tdap) 02/20/2003     TETANUS IMMUNIZATION (SYSTEM ASSIGNED)  02/20/2014     INFLUENZA VACCINE (SYSTEM ASSIGNED)  09/01/2017     Health Maintenance reviewed at today's visit patient asked to schedule/complete:   Immunizations:  Patient agrees to schedule    "

## 2017-10-27 NOTE — MR AVS SNAPSHOT
"              After Visit Summary   10/27/2017    Shona Aguilar    MRN: [id number]           Patient Information     Date Of Birth          1995        Visit Information        Provider Department      10/27/2017 10:30 AM Siegler, Nicole Joy, PA-C [facility]        Today's Diagnoses     Need for prophylactic vaccination and inoculation against influenza        Need for prophylactic vaccination with tetanus-diphtheria (TD)          Care Instructions    Continue to monitor your symptoms  Be seen by psychiatry- call me and let me know the name and date/time of the appointment.   Keep your appointment with therapy also  Return to the ER if you have any worsening or new symptoms          Follow-ups after your visit        Who to contact     If you have questions or need follow up information about today's clinic visit or your schedule please contact [facility] directly at [phone].  Normal or non-critical lab and imaging results will be communicated to you by MyChart, letter or phone within 4 business days after the clinic has received the results. If you do not hear from us within 7 days, please contact the clinic through MyChart or phone. If you have a critical or abnormal lab result, we will notify you by phone as soon as possible.  Submit refill requests through Qool or call your pharmacy and they will forward the refill request to us. Please allow 3 business days for your refill to be completed.          Additional Information About Your Visit        MyChart Information     Qool lets you send messages to your doctor, view your test results, renew your prescriptions, schedule appointments and more. To sign up, go to [email or web address] . Click on \"Log in\" on the left side of the screen, which will take you to the Welcome page. Then click on \"Sign up Now\" on the right side of the page.     You will be asked to enter the access code listed " "below, as well as some personal information. Please follow the directions to create your username and password.     Your access code is: [email or web address]  Expires: 2018  3:11 PM     Your access code will  in 90 days. If you need help or a new code, please call your [facility] clinic or [phone].        Care EveryWhere ID     This is your Care EveryWhere ID. This could be used by other organizations to access your [facility] medical records  [id number]        Your Vitals Were     Pulse Temperature Respirations Height Last Period Pulse Oximetry    71 96.8  F (36  C) (Tympanic) 20 5' 3\" (1.6 m) (LMP Unknown) 99%    Breastfeeding? BMI (Body Mass Index)                No 27.81 kg/m2           Blood Pressure from Last 3 Encounters:   10/27/17 100/70   10/24/17 134/85   10/20/17 110/60    Weight from Last 3 Encounters:   10/27/17 157 lb (71.2 kg)   10/24/17 157 lb (71.2 kg)   10/20/17 157 lb (71.2 kg)              Today, you had the following     No orders found for display       Primary Care Provider Office Phone # Fax #    [facility] [phone]       [facility] [facility] [address]  [place] [id number]        Equal Access to Services     TIM PHILIP AH: Hadii aad ku hadasho Soomaali, waaxda luqadaha, qaybta kaalmada adeegyada, waxay idiin hayhodan regi fried. So [facility] [phone].    ATENCIÓN: Si habla español, tiene a dick disposición servicios gratuitos de asistencia lingüística. Llelsy al [phone].    We comply with applicable federal civil rights laws and [place] laws. We do not discriminate on the basis of race, color, national origin, age, disability, sex, sexual orientation, or gender identity.            Thank you!     Thank you for choosing [facility]  for your care. Our goal is always to provide you with excellent care. Hearing back from our patients is one way we can continue to improve our services. Please " take a few minutes to complete the written survey that you may receive in the mail after your visit with us. Thank you!             Your Updated Medication List - Protect others around you: Learn how to safely use, store and throw away your medicines at www.disposemymeds.org.          This list is accurate as of: 10/27/17 10:47 AM.  Always use your most recent med list.                   Brand Name Dispense Instructions for use Diagnosis    escitalopram 20 MG tablet    LEXAPRO    30 tablet    Take 1 tablet (20 mg) by mouth daily Start with 1/2 tab for 2 weeks, then increase to 1 tab.    Severe episode of recurrent major depressive disorder, without psychotic features (H), PABLO (generalized anxiety disorder), Panic attack       IBUPROFEN PO      Take 400 mg by mouth

## 2017-12-19 ENCOUNTER — TELEPHONE (OUTPATIENT)
Dept: FAMILY MEDICINE | Facility: CLINIC | Age: 22
End: 2017-12-19

## 2017-12-19 ENCOUNTER — OFFICE VISIT (OUTPATIENT)
Dept: FAMILY MEDICINE | Facility: CLINIC | Age: 22
End: 2017-12-19
Payer: COMMERCIAL

## 2017-12-19 VITALS
WEIGHT: 158 LBS | HEART RATE: 80 BPM | OXYGEN SATURATION: 97 % | DIASTOLIC BLOOD PRESSURE: 78 MMHG | RESPIRATION RATE: 16 BRPM | BODY MASS INDEX: 27.99 KG/M2 | TEMPERATURE: 99 F | SYSTOLIC BLOOD PRESSURE: 110 MMHG

## 2017-12-19 DIAGNOSIS — K21.9 GASTRIC REFLUX SYNDROME: ICD-10-CM

## 2017-12-19 DIAGNOSIS — L29.9 ITCHY SKIN: Primary | ICD-10-CM

## 2017-12-19 PROCEDURE — 99214 OFFICE O/P EST MOD 30 MIN: CPT | Performed by: PHYSICIAN ASSISTANT

## 2017-12-19 RX ORDER — TRIAMCINOLONE ACETONIDE 1 MG/G
CREAM TOPICAL
Qty: 160 G | Refills: 3 | Status: SHIPPED | OUTPATIENT
Start: 2017-12-19 | End: 2020-02-03

## 2017-12-19 ASSESSMENT — PATIENT HEALTH QUESTIONNAIRE - PHQ9
SUM OF ALL RESPONSES TO PHQ QUESTIONS 1-9: 7
SUM OF ALL RESPONSES TO PHQ QUESTIONS 1-9: 7
10. IF YOU CHECKED OFF ANY PROBLEMS, HOW DIFFICULT HAVE THESE PROBLEMS MADE IT FOR YOU TO DO YOUR WORK, TAKE CARE OF THINGS AT HOME, OR GET ALONG WITH OTHER PEOPLE: SOMEWHAT DIFFICULT

## 2017-12-19 NOTE — MR AVS SNAPSHOT
After Visit Summary   12/19/2017    Shona Aguilar    MRN: [id number]           Patient Information     Date Of Birth          1995        Visit Information        Provider Department      12/19/2017 8:50 AM Siegler, Nicole Joy, PA-C [facility]        Today's Diagnoses     Itchy skin    -  1    Gastric reflux syndrome          Care Instructions      Lifestyle Changes for Controlling GERD  When you have GERD, stomach acid feels as if it s backing up toward your mouth. Whether or not you take medicine to control your GERD, your symptoms can often be improved with lifestyle changes. Talk to your healthcare provider about the following suggestions. These suggestions may help you get relief from your symptoms.      Raise your head  Reflux is more likely to strike when you re lying down flat, because stomach fluid can flow backward more easily. Raising the head of your bed 4 to 6 inches can help. To do this:    Slide blocks or books under the legs at the head of your bed. Or, place a wedge under the mattress. Many [employer] can make a suitable wedge for you. The wedge should run from your waist to the top of your head.    Don t just prop your head on several pillows. This increases pressure on your stomach. It can make GERD worse.  Watch your eating habits  Certain foods may increase the acid in your stomach or relax the lower esophageal sphincter. This makes GERD more likely. It s best to avoid the following if they cause you symptoms:    Coffee, tea, and carbonated drinks (with and without caffeine)    Fatty, fried, or spicy food    Mint, chocolate, onions, and tomatoes    Peppermint    Any other foods that seem to irritate your stomach or cause you pain  Relieve the pressure  Tips include the following:    Eat smaller meals, even if you have to eat more often.    Don t lie down right after you eat. Wait a few hours for your stomach to empty.    Avoid tight belts and  tight-fitting clothes.    Lose excess weight.  Tobacco and alcohol  Avoid smoking tobacco and drinking alcohol. They can make GERD symptoms worse.  Date Last Reviewed: 7/1/2016 2000-2017 [employer]. [address] [place] [address], [place] [address]. All rights reserved. This information is not intended as a substitute for professional medical care. Always follow your healthcare professional's instructions.        Medicines for GERD  Gastroesophageal reflux disease (GERD) can be treated with medicine. This may be done with a medicine you can buy over the counter. Or it may be done with a medicine that your healthcare provider has to prescribe. In some cases, both types may be used. Your provider will tell you what is best for your symptoms.  Antacids  Antacids work to weaken the acid in your stomach and can give you quick relief. You can buy many of them with no prescription. Antacids can be high in sodium. This may be a problem if you have high blood pressure. Some antacids also have aluminum. This should be avoided if you have long-term (chronic) kidney disease. So check with your provider first. Take antacids only when you need to, as advised by your provider.  Side effects: Constipation, diarrhea. If you take too much medicine, it can cause calcium to build up.   H-2 blockers  H-2 blockers cause the stomach to make less acid. They are often used both on demand as symptoms occur, and daily to keep symptoms away. Your provider may prescribe them if antacids don t work for you. You can buy some of them over the counter. These come in a lower dosage.  Side effects: Confusion in older adults  Proton-pump inhibitors  These also cause the stomach to make less acid. They reduce stomach acid more than H-2 blockers. They may be used for a short time, or longer to treat certain conditions. You can buy some of them over the counter. Or your provider may prescribe them. They help control GERD symptoms.  Side  effects: Belly (abdominal) pain, diarrhea, upset stomach (nausea). Possible other side effects linked to long-term use and high doses.  Prokinetics  These medicines affect the movement of the digestive tract. They may be recommended if your stomach is emptying too slowly. But in most cases they are not recommended for treating GERD.   Side effects: Tiredness, depression, anxiety, problems with physical movement, belly cramps, constipation, diarrhea, a jittery feeling  Medicines to avoid  Don t take aspirin without your provider s approval. And don t take a nonsteroidal anti-inflammatory drug (NSAID), such as ibuprofen. These reduce the protective lining of your stomach. This can lead to more GERD symptoms. Check with your provider or pharmacist before taking a new medicine.   Date Last Reviewed: 7/1/2016 2000-2017 [employer]. [address] [place] [address]. All rights reserved. This information is not intended as a substitute for professional medical care. Always follow your healthcare professional's instructions.                Follow-ups after your visit        Who to contact     If you have questions or need follow up information about today's clinic visit or your schedule please contact [facility] directly at [phone].  Normal or non-critical lab and imaging results will be communicated to you by [employer], letter or phone within 4 business days after the clinic has received the results. If you do not hear from us within 7 days, please contact the clinic through [employer] or phone. If you have a critical or abnormal lab result, we will notify you by phone as soon as possible.  Submit refill requests through [employer] or call your pharmacy and they will forward the refill request to us. Please allow 3 business days for your refill to be completed.          Additional Information About Your Visit        [employer] Information     [employer] lets you send messages  "to your doctor, view your test results, renew your prescriptions, schedule appointments and more. To sign up, go to [email or web address] . Click on \"Log in\" on the left side of the screen, which will take you to the Welcome page. Then click on \"Sign up Now\" on the right side of the page.     You will be asked to enter the access code listed below, as well as some personal information. Please follow the directions to create your username and password.     Your access code is: [email or web address]  Expires: 2018  2:11 PM     Your access code will  in 90 days. If you need help or a new code, please call your [place] clinic or [phone].        Care EveryWhere ID     This is your Care EveryWhere ID. This could be used by other organizations to access your [place] medical records  [id number]        Your Vitals Were     Pulse Temperature Respirations Pulse Oximetry BMI (Body Mass Index)       80 99  F (37.2  C) 16 97% 27.99 kg/m2        Blood Pressure from Last 3 Encounters:   17 110/78   10/27/17 100/70   10/24/17 134/85    Weight from Last 3 Encounters:   17 158 lb (71.7 kg)   10/27/17 157 lb (71.2 kg)   10/24/17 157 lb (71.2 kg)              Today, you had the following     No orders found for display         Today's Medication Changes          These changes are accurate as of: 17  9:22 AM.  If you have any questions, ask your nurse or doctor.               Start taking these medicines.        Dose/Directions    triamcinolone 0.1 % cream   Commonly known as:  KENALOG   Used for:  Itchy skin   Started by:  Siegler, Nicole Joy, PA-C        Apply sparingly to affected area three times daily as needed for 2 weeks.   Quantity:  160 g   Refills:  3            Where to get your medicines      These medications were sent to [facility] [address] [address]     Phone:  [phone]     triamcinolone 0.1 % cream                " Primary Care Provider Office Phone # Fax #    [facility] [phone]       [address], [address]  [facility] [address]        Equal Access to Services     TIM FRIED: Hadii aad ku hadastero Sovarghese, waaxda luqadaha, qaybta kaalmada adejakeda, devon blake joel fried. So [facility] [phone].    ATENCIÓN: Si habla español, tiene a dick disposición servicios gratuitos de asistencia lingüística. Llame al [phone].    We comply with applicable federal civil rights laws and [place] laws. We do not discriminate on the basis of race, color, national origin, age, disability, sex, sexual orientation, or gender identity.            Thank you!     Thank you for choosing [facility]  for your care. Our goal is always to provide you with excellent care. Hearing back from our patients is one way we can continue to improve our services. Please take a few minutes to complete the written survey that you may receive in the mail after your visit with us. Thank you!             Your Updated Medication List - Protect others around you: Learn how to safely use, store and throw away your medicines at www.disposemymeds.org.          This list is accurate as of: 12/19/17  9:22 AM.  Always use your most recent med list.                   Brand Name Dispense Instructions for use Diagnosis    escitalopram 20 MG tablet    LEXAPRO    30 tablet    Take 1 tablet (20 mg) by mouth daily Start with 1/2 tab for 2 weeks, then increase to 1 tab.    Severe episode of recurrent major depressive disorder, without psychotic features (H), PABLO (generalized anxiety disorder), Panic attack       IBUPROFEN PO      Take 400 mg by mouth        MIRENA (52 MG) 20 MCG/24HR IUD   Generic drug:  levonorgestrel      1 each by Intrauterine route once        triamcinolone 0.1 % cream    KENALOG    160 g    Apply sparingly to affected area three times daily as  needed for 2 weeks.    Itchy skin

## 2017-12-19 NOTE — LETTER
[facility]  [address]  [place] [facility] [address]  Phone: [phone]  Fax: [phone]    December 19, 2017        Shona Aguilar  [address] [facility] [address]          To whom it may concern:    RE: Shona Aguilar    Patient was seen and treated today at our clinic today. She missed work on 12/17/17 and 12/18/17 due to illness. She is currently improved and cleared to return to work with no restrictions.     Please contact me for questions or concerns.      Sincerely,        Nicole Joy Siegler, PA-C

## 2017-12-19 NOTE — PROGRESS NOTES
SUBJECTIVE:   Shona Aguilar is a 22 year old female who presents to clinic today for the following health issues:    Abdominal Pain    Duration: sunday    Description (location/character/radiation): pt states that she has always has had abdominal issues but bad since sunday       Associated flank pain: None    Intensity:  moderate    Accompanying signs and symptoms:        Fever/Chills: no        Gas/Bloating: YES- a little       Nausea/vomitting: YES- nausea       Diarrhea: YES       Dysuria or Hematuria: no     History (previous similar pain/trauma/previous testing): none    Precipitating or alleviating factors:       Pain worse with eating/BM/urination: no       Pain relieved by BM: YES    Therapies tried and outcome: None    LMP:  not applicable    When at work she began having abdominal pain, acid feeling with reflux and some diarrhea. She has had continued acid feeling in the chest and stomach. No vomiting and no fever. No melena or hematochezia. She has not had acid reflux in the past but feels her symptoms are similar to that. Her symptoms have since improved, but she needs a note for work stating that she had these symptoms.     She complains of an itchy spot on her skin in the anterior chest for several months that is not improving with use of hydrocortisone. She is not hydrating with a good lotion daily. She does scratch and sometimes causes excoriations/ lesions in the area which do eventually heal.     Problem list and histories reviewed & adjusted, as indicated.  Additional history: as documented    Patient Active Problem List   Diagnosis     Severe episode of recurrent major depressive disorder, without psychotic features (H)     PABLO (generalized anxiety disorder)     Panic attack     Disorganized thinking     Past Surgical History:   Procedure Laterality Date     ENT SURGERY      tonsillectomy; ear tubes     HEAD & NECK SURGERY      wisdom teeth extraction       Social History   Substance Use  Topics     Smoking status: Never Smoker     Smokeless tobacco: Never Used     Alcohol use Yes     Family History   Problem Relation Age of Onset     Depression Mother      Anxiety Disorder Mother      MENTAL ILLNESS Mother      OCD     Attention Deficit Disorder Mother      MENTAL ILLNESS Father      Anxiety Disorder Father      Depression Father      Attention Deficit Disorder Father      Alcoholism Maternal Grandmother      Alcoholism Maternal Grandfather      Substance Abuse Maternal Uncle      Schizophrenia Maternal Uncle      DIABETES No family hx of      Coronary Artery Disease No family hx of      Hypertension No family hx of      Hyperlipidemia No family hx of      CEREBROVASCULAR DISEASE No family hx of      Breast Cancer No family hx of      Colon Cancer No family hx of      Prostate Cancer No family hx of      Other Cancer No family hx of      Anesthesia Reaction No family hx of      Asthma No family hx of      OSTEOPOROSIS No family hx of      Genetic Disorder No family hx of      Thyroid Disease No family hx of      Obesity No family hx of      Unknown/Adopted No family hx of          Current Outpatient Prescriptions   Medication Sig Dispense Refill     levonorgestrel (MIRENA, 52 MG,) 20 MCG/24HR IUD 1 each by Intrauterine route once       triamcinolone (KENALOG) 0.1 % cream Apply sparingly to affected area three times daily as needed for 2 weeks. 160 g 3     escitalopram (LEXAPRO) 20 MG tablet Take 1 tablet (20 mg) by mouth daily Start with 1/2 tab for 2 weeks, then increase to 1 tab. 30 tablet 0     IBUPROFEN PO Take 400 mg by mouth          Reviewed and updated as needed this visit by clinical staffTobacco  Allergies  Meds  Med Hx  Surg Hx  Fam Hx  Soc Hx      Reviewed and updated as needed this visit by Provider       ROS:  Constitutional, HEENT, cardiovascular, pulmonary, gi and gu systems are negative, except as otherwise noted.      OBJECTIVE:   /78  Pulse 80  Temp 99  F (37.2  C)   Resp 16  Wt 158 lb (71.7 kg)  SpO2 97%  BMI 27.99 kg/m2  Body mass index is 27.99 kg/(m^2).  GENERAL: healthy, alert and no distress  NECK: no adenopathy, no asymmetry, masses, or scars and thyroid normal to palpation  RESP: lungs clear to auscultation - no rales, rhonchi or wheezes  CV: regular rate and rhythm, normal S1 S2, no S3 or S4, no murmur, click or rub, no peripheral edema and peripheral pulses strong  ABDOMEN: soft, nontender, no hepatosplenomegaly, no masses and bowel sounds normal  SKIN: itchy rash without tenderness on the anterior chest. No ecchymosis, lesions or cellulitis.   BACK: no CVA tenderness, no paralumbar tenderness  PSYCH: mentation appears normal, affect normal/bright    Diagnostic Test Results:  none     ASSESSMENT/PLAN:         ICD-10-CM    1. Itchy skin L29.9 triamcinolone (KENALOG) 0.1 % cream   2. Gastric reflux syndrome K21.9      No labs today as no current symptoms but I did agree to write a letter for work regarding the GERD symptoms. Discussed meds below and foods/drinks to avoid.     Discussed medication appropriate use, potential side effects and anticipated benefit of use of triamcinolone. Discussed use of triamcinolone for 2 weeks then taking a break, be sure to use hydrating lotions with glycerine products and avoid scratching. May also use OTC antihistamine for itching purposes.     Patient Instructions       Lifestyle Changes for Controlling GERD  When you have GERD, stomach acid feels as if it s backing up toward your mouth. Whether or not you take medicine to control your GERD, your symptoms can often be improved with lifestyle changes. Talk to your healthcare provider about the following suggestions. These suggestions may help you get relief from your symptoms.      Raise your head  Reflux is more likely to strike when you re lying down flat, because stomach fluid can flow backward more easily. Raising the head of your bed 4 to 6 inches can help. To do this:    Slide  blocks or books under the legs at the head of your bed. Or, place a wedge under the mattress. Many foam stores can make a suitable wedge for you. The wedge should run from your waist to the top of your head.    Don t just prop your head on several pillows. This increases pressure on your stomach. It can make GERD worse.  Watch your eating habits  Certain foods may increase the acid in your stomach or relax the lower esophageal sphincter. This makes GERD more likely. It s best to avoid the following if they cause you symptoms:    Coffee, tea, and carbonated drinks (with and without caffeine)    Fatty, fried, or spicy food    Mint, chocolate, onions, and tomatoes    Peppermint    Any other foods that seem to irritate your stomach or cause you pain  Relieve the pressure  Tips include the following:    Eat smaller meals, even if you have to eat more often.    Don t lie down right after you eat. Wait a few hours for your stomach to empty.    Avoid tight belts and tight-fitting clothes.    Lose excess weight.  Tobacco and alcohol  Avoid smoking tobacco and drinking alcohol. They can make GERD symptoms worse.  Date Last Reviewed: 7/1/2016 2000-2017 [employer]. [address] [place] [address]. All rights reserved. This information is not intended as a substitute for professional medical care. Always follow your healthcare professional's instructions.        Medicines for GERD  Gastroesophageal reflux disease (GERD) can be treated with medicine. This may be done with a medicine you can buy over the counter. Or it may be done with a medicine that your healthcare provider has to prescribe. In some cases, both types may be used. Your provider will tell you what is best for your symptoms.  Antacids  Antacids work to weaken the acid in your stomach and can give you quick relief. You can buy many of them with no prescription. Antacids can be high in sodium. This may be a problem if you have high blood  pressure. Some antacids also have aluminum. This should be avoided if you have long-term (chronic) kidney disease. So check with your provider first. Take antacids only when you need to, as advised by your provider.  Side effects: Constipation, diarrhea. If you take too much medicine, it can cause calcium to build up.   H-2 blockers  H-2 blockers cause the stomach to make less acid. They are often used both on demand as symptoms occur, and daily to keep symptoms away. Your provider may prescribe them if antacids don t work for you. You can buy some of them over the counter. These come in a lower dosage.  Side effects: Confusion in older adults  Proton-pump inhibitors  These also cause the stomach to make less acid. They reduce stomach acid more than H-2 blockers. They may be used for a short time, or longer to treat certain conditions. You can buy some of them over the counter. Or your provider may prescribe them. They help control GERD symptoms.  Side effects: Belly (abdominal) pain, diarrhea, upset stomach (nausea). Possible other side effects linked to long-term use and high doses.  Prokinetics  These medicines affect the movement of the digestive tract. They may be recommended if your stomach is emptying too slowly. But in most cases they are not recommended for treating GERD.   Side effects: Tiredness, depression, anxiety, problems with physical movement, belly cramps, constipation, diarrhea, a jittery feeling  Medicines to avoid  Don t take aspirin without your provider s approval. And don t take a nonsteroidal anti-inflammatory drug (NSAID), such as ibuprofen. These reduce the protective lining of your stomach. This can lead to more GERD symptoms. Check with your provider or pharmacist before taking a new medicine.   Date Last Reviewed: 7/1/2016 2000-2017 [employer]. [address] [place] [address], [place] [address]. All rights reserved. This information is not intended as a substitute for  professional medical care. Always follow your healthcare professional's instructions.            Nicole Joy Siegler, PA-C  [facility]

## 2017-12-19 NOTE — TELEPHONE ENCOUNTER
Reason for Call:  Form, our goal is to have forms completed with 72 hours, however, some forms may require a visit or additional information.    Type of letter, form or note:  medical    Who is the form from?: Home care    Where did the form come from: form was faxed in    What clinic location was the form placed at?: [facility]    Where the form was placed: 's Box: Nicole Siegler, [place]    What number is listed as a contact on the form?: [phone]  Phone [phone]       Additional comments: Ashwini callahan ability eval form     Call taken on 12/19/2017 at 12:12 PM by Radha Ovalle

## 2017-12-19 NOTE — NURSING NOTE
"Chief Complaint   Patient presents with     Abdominal Pain       Initial /78  Pulse 80  Temp 99  F (37.2  C)  Resp 16  Wt 158 lb (71.7 kg)  SpO2 97%  BMI 27.99 kg/m2 Estimated body mass index is 27.99 kg/(m^2) as calculated from the following:    Height as of 10/27/17: 5' 3\" (1.6 m).    Weight as of this encounter: 158 lb (71.7 kg).  Medication Reconciliation: estuardo Hathaway CMA      "

## 2017-12-19 NOTE — PATIENT INSTRUCTIONS
Lifestyle Changes for Controlling GERD  When you have GERD, stomach acid feels as if it s backing up toward your mouth. Whether or not you take medicine to control your GERD, your symptoms can often be improved with lifestyle changes. Talk to your healthcare provider about the following suggestions. These suggestions may help you get relief from your symptoms.      Raise your head  Reflux is more likely to strike when you re lying down flat, because stomach fluid can flow backward more easily. Raising the head of your bed 4 to 6 inches can help. To do this:    Slide blocks or books under the legs at the head of your bed. Or, place a wedge under the mattress. Many [employer] can make a suitable wedge for you. The wedge should run from your waist to the top of your head.    Don t just prop your head on several pillows. This increases pressure on your stomach. It can make GERD worse.  Watch your eating habits  Certain foods may increase the acid in your stomach or relax the lower esophageal sphincter. This makes GERD more likely. It s best to avoid the following if they cause you symptoms:    Coffee, tea, and carbonated drinks (with and without caffeine)    Fatty, fried, or spicy food    Mint, chocolate, onions, and tomatoes    Peppermint    Any other foods that seem to irritate your stomach or cause you pain  Relieve the pressure  Tips include the following:    Eat smaller meals, even if you have to eat more often.    Don t lie down right after you eat. Wait a few hours for your stomach to empty.    Avoid tight belts and tight-fitting clothes.    Lose excess weight.  Tobacco and alcohol  Avoid smoking tobacco and drinking alcohol. They can make GERD symptoms worse.  Date Last Reviewed: 7/1/2016 2000-2017 [employer]. [address] [place] [id number]. All rights reserved. This information is not intended as a substitute for professional medical care. Always follow your healthcare  professional's instructions.        Medicines for GERD  Gastroesophageal reflux disease (GERD) can be treated with medicine. This may be done with a medicine you can buy over the counter. Or it may be done with a medicine that your healthcare provider has to prescribe. In some cases, both types may be used. Your provider will tell you what is best for your symptoms.  Antacids  Antacids work to weaken the acid in your stomach and can give you quick relief. You can buy many of them with no prescription. Antacids can be high in sodium. This may be a problem if you have high blood pressure. Some antacids also have aluminum. This should be avoided if you have long-term (chronic) kidney disease. So check with your provider first. Take antacids only when you need to, as advised by your provider.  Side effects: Constipation, diarrhea. If you take too much medicine, it can cause calcium to build up.   H-2 blockers  H-2 blockers cause the stomach to make less acid. They are often used both on demand as symptoms occur, and daily to keep symptoms away. Your provider may prescribe them if antacids don t work for you. You can buy some of them over the counter. These come in a lower dosage.  Side effects: Confusion in older adults  Proton-pump inhibitors  These also cause the stomach to make less acid. They reduce stomach acid more than H-2 blockers. They may be used for a short time, or longer to treat certain conditions. You can buy some of them over the counter. Or your provider may prescribe them. They help control GERD symptoms.  Side effects: Belly (abdominal) pain, diarrhea, upset stomach (nausea). Possible other side effects linked to long-term use and high doses.  Prokinetics  These medicines affect the movement of the digestive tract. They may be recommended if your stomach is emptying too slowly. But in most cases they are not recommended for treating GERD.   Side effects: Tiredness, depression, anxiety, problems with  physical movement, belly cramps, constipation, diarrhea, a jittery feeling  Medicines to avoid  Don t take aspirin without your provider s approval. And don t take a nonsteroidal anti-inflammatory drug (NSAID), such as ibuprofen. These reduce the protective lining of your stomach. This can lead to more GERD symptoms. Check with your provider or pharmacist before taking a new medicine.   Date Last Reviewed: 7/1/2016 2000-2017 [employer]. [address] [place] [address], [place] [id number]. All rights reserved. This information is not intended as a substitute for professional medical care. Always follow your healthcare professional's instructions.

## 2018-11-28 ENCOUNTER — OFFICE VISIT (OUTPATIENT)
Dept: FAMILY MEDICINE | Facility: CLINIC | Age: 23
End: 2018-11-28
Payer: COMMERCIAL

## 2018-11-28 VITALS
WEIGHT: 171 LBS | HEART RATE: 72 BPM | SYSTOLIC BLOOD PRESSURE: 110 MMHG | OXYGEN SATURATION: 100 % | TEMPERATURE: 99.2 F | RESPIRATION RATE: 14 BRPM | DIASTOLIC BLOOD PRESSURE: 72 MMHG | BODY MASS INDEX: 29.19 KG/M2 | HEIGHT: 64 IN

## 2018-11-28 DIAGNOSIS — F42.9 OBSESSIVE-COMPULSIVE DISORDER, UNSPECIFIED TYPE: ICD-10-CM

## 2018-11-28 DIAGNOSIS — F41.1 GAD (GENERALIZED ANXIETY DISORDER): ICD-10-CM

## 2018-11-28 DIAGNOSIS — Z11.3 SCREEN FOR STD (SEXUALLY TRANSMITTED DISEASE): ICD-10-CM

## 2018-11-28 DIAGNOSIS — F41.0 PANIC ATTACK: ICD-10-CM

## 2018-11-28 DIAGNOSIS — Z00.01 ENCOUNTER FOR ROUTINE ADULT HEALTH EXAMINATION WITH ABNORMAL FINDINGS: Primary | ICD-10-CM

## 2018-11-28 DIAGNOSIS — Z23 NEED FOR TDAP VACCINATION: ICD-10-CM

## 2018-11-28 DIAGNOSIS — F33.2 SEVERE EPISODE OF RECURRENT MAJOR DEPRESSIVE DISORDER, WITHOUT PSYCHOTIC FEATURES (H): ICD-10-CM

## 2018-11-28 PROCEDURE — 90471 IMMUNIZATION ADMIN: CPT | Performed by: FAMILY MEDICINE

## 2018-11-28 PROCEDURE — 90715 TDAP VACCINE 7 YRS/> IM: CPT | Performed by: FAMILY MEDICINE

## 2018-11-28 PROCEDURE — 99395 PREV VISIT EST AGE 18-39: CPT | Mod: 25 | Performed by: FAMILY MEDICINE

## 2018-11-28 RX ORDER — SERTRALINE HYDROCHLORIDE 100 MG/1
TABLET, FILM COATED ORAL
Qty: 90 TABLET | Refills: 0 | Status: SHIPPED | OUTPATIENT
Start: 2018-11-28 | End: 2019-03-01

## 2018-11-28 RX ORDER — LORAZEPAM 0.5 MG/1
0.5 TABLET ORAL EVERY 8 HOURS PRN
Qty: 10 TABLET | Refills: 0 | Status: SHIPPED | OUTPATIENT
Start: 2018-11-28 | End: 2020-02-03

## 2018-11-28 ASSESSMENT — ANXIETY QUESTIONNAIRES
7. FEELING AFRAID AS IF SOMETHING AWFUL MIGHT HAPPEN: MORE THAN HALF THE DAYS
GAD7 TOTAL SCORE: 16
5. BEING SO RESTLESS THAT IT IS HARD TO SIT STILL: SEVERAL DAYS
2. NOT BEING ABLE TO STOP OR CONTROL WORRYING: NEARLY EVERY DAY
GAD7 TOTAL SCORE: 16
3. WORRYING TOO MUCH ABOUT DIFFERENT THINGS: NEARLY EVERY DAY
4. TROUBLE RELAXING: MORE THAN HALF THE DAYS
7. FEELING AFRAID AS IF SOMETHING AWFUL MIGHT HAPPEN: MORE THAN HALF THE DAYS
GAD7 TOTAL SCORE: 16
6. BECOMING EASILY ANNOYED OR IRRITABLE: MORE THAN HALF THE DAYS
1. FEELING NERVOUS, ANXIOUS, OR ON EDGE: NEARLY EVERY DAY

## 2018-11-28 ASSESSMENT — PATIENT HEALTH QUESTIONNAIRE - PHQ9
SUM OF ALL RESPONSES TO PHQ QUESTIONS 1-9: 10
10. IF YOU CHECKED OFF ANY PROBLEMS, HOW DIFFICULT HAVE THESE PROBLEMS MADE IT FOR YOU TO DO YOUR WORK, TAKE CARE OF THINGS AT HOME, OR GET ALONG WITH OTHER PEOPLE: SOMEWHAT DIFFICULT
SUM OF ALL RESPONSES TO PHQ QUESTIONS 1-9: 10

## 2018-11-28 NOTE — NURSING NOTE
Screening Questionnaire for Adult Immunization    Are you sick today?   No   Do you have allergies to medications, food, a vaccine component or latex?   No   Have you ever had a serious reaction after receiving a vaccination?   No   Do you have a long-term health problem with heart disease, lung disease, asthma, kidney disease, metabolic disease (e.g. diabetes), anemia, or other blood disorder?   No   Do you have cancer, leukemia, HIV/AIDS, or any other immune system problem?   No   In the past 3 months, have you taken medications that affect  your immune system, such as prednisone, other steroids, or anticancer drugs; drugs for the treatment of rheumatoid arthritis, Crohn s disease, or psoriasis; or have you had radiation treatments?   No   Have you had a seizure, or a brain or other nervous system problem?   No   During the past year, have you received a transfusion of blood or blood     products, or been given immune (gamma) globulin or antiviral drug?   No   For women: Are you pregnant or is there a chance you could become        pregnant during the next month?   No   Have you received any vaccinations in the past 4 weeks?   No     Immunization questionnaire answers were all negative.        Per orders of Dr. Keyes, injection of Adacel was given by Ann Vicente. Patient instructed to remain in clinic for 15 minutes afterwards, and to report any adverse reaction to me immediately.       Screening performed by Ann Vicente on 11/28/2018 at 10:27 AM.
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
## 2018-11-28 NOTE — MR AVS SNAPSHOT
After Visit Summary   11/28/2018    Shona Aguilar    MRN: [id number]           Patient Information     Date Of Birth          1995        Visit Information        Provider Department      11/28/2018 9:10 AM Abena Keyes DO [facility]        Today's Diagnoses     Encounter for routine adult health examination with abnormal findings    -  1    Severe episode of recurrent major depressive disorder, without psychotic features (H)        Obsessive-compulsive disorder, unspecified type        PABLO (generalized anxiety disorder)        Panic attack        Screen for STD (sexually transmitted disease)        Need for Tdap vaccination          Care Instructions      Preventive Health Recommendations  Female Ages 21 to 25     Yearly exam:     See your health care provider every year in order to  o Review health changes.   o Discuss preventive care.    o Review your medicines if your doctor has prescribed any.      You should be tested each year for STDs (sexually transmitted diseases).       Talk to your provider about how often you should have cholesterol testing.      Get a Pap test every three years. If you have an abnormal result, your doctor may have you test more often.      If you are at risk for diabetes, you should have a diabetes test (fasting glucose).     Shots:     Get a flu shot each year.     Get a tetanus shot every 10 years.     Consider getting the shot (vaccine) that prevents cervical cancer (Gardasil).    Nutrition:     Eat at least 5 servings of fruits and vegetables each day.    Eat whole-grain bread, whole-wheat pasta and brown rice instead of white grains and rice.    Get adequate Calcium and Vitamin D.     Lifestyle    Exercise at least 150 minutes a week each week (30 minutes a day, 5 days a week). This will help you control your weight and prevent disease.    Limit alcohol to one drink per day.    No smoking.     Wear sunscreen to prevent  "skin cancer.    See your dentist every six months for an exam and cleaning.          Follow-ups after your visit        Follow-up notes from your care team     Return in about 6 months (around 5/28/2019) for Routine Visit.      Who to contact     If you have questions or need follow up information about today's clinic visit or your schedule please contact [facility] directly at [phone].  Normal or non-critical lab and imaging results will be communicated to you by [employer], letter or phone within 4 business days after the clinic has received the results. If you do not hear from us within 7 days, please contact the clinic through [employer] or phone. If you have a critical or abnormal lab result, we will notify you by phone as soon as possible.  Submit refill requests through eEvent or call your pharmacy and they will forward the refill request to us. Please allow 3 business days for your refill to be completed.          Additional Information About Your Visit        [employer] Information     eEvent gives you secure access to your electronic health record. If you see a primary care provider, you can also send messages to your care team and make appointments. If you have questions, please call your primary care clinic.  If you do not have a primary care provider, please call [phone] and they will assist you.        Care EveryWhere ID     This is your Care EveryWhere ID. This could be used by other organizations to access your [place] medical records  [id number]        Your Vitals Were     Pulse Temperature Respirations Height Last Period Pulse Oximetry    72 99.2  F (37.3  C) (Tympanic) 14 5' 3.5\" (1.613 m) 11/07/2018 (Approximate) 100%    Breastfeeding? BMI (Body Mass Index)                No 29.82 kg/m2           Blood Pressure from Last 3 Encounters:   11/28/18 110/72   12/19/17 110/78   10/27/17 100/70    Weight from Last 3 Encounters:   11/28/18 171 lb (77.6 kg)   12/19/17 " 158 lb (71.7 kg)   10/27/17 157 lb (71.2 kg)              We Performed the Following     TDAP, IM (10 - 64 YRS) - Adacel          Today's Medication Changes          These changes are accurate as of 11/28/18  9:45 AM.  If you have any questions, ask your nurse or doctor.               Start taking these medicines.        Dose/Directions    LORazepam 0.5 MG tablet   Commonly known as:  ATIVAN   Used for:  PABLO (generalized anxiety disorder), Panic attack   Started by:  Abena Keyes DO        Dose:  0.5 mg   Take 1 tablet (0.5 mg) by mouth every 8 hours as needed for anxiety   Quantity:  10 tablet   Refills:  0       sertraline 100 MG tablet   Commonly known as:  ZOLOFT   Used for:  Severe episode of recurrent major depressive disorder, without psychotic features (H), Obsessive-compulsive disorder, unspecified type, PABLO (generalized anxiety disorder)   Started by:  Abena Keyes DO        Take 1/2 tab (50 mg) x 5-7 days, then increase to 1 tab (100 mg) daily   Quantity:  90 tablet   Refills:  0            Where to get your medicines      These medications were sent to [place] [address] [place], [place] - [address] [facility]  [address] [place] [id number]     Phone:  [phone]     sertraline 100 MG tablet         Some of these will need a paper prescription and others can be bought over the counter.  Ask your nurse if you have questions.     Bring a paper prescription for each of these medications     LORazepam 0.5 MG tablet                Primary Care Provider Office Phone # Fax #    [facility] [phone]       [address] [place] [address], [address] [place] [id number]        Equal Access to Services     [facility] AH: Coty Hassan, maryjo downing, qajamil kaaldevon davila. So [facility] [phone].    ATENCIÓN: Si habla español, tiene a dick disposición servicios gratuitos de  asistencia lingüística. Candelario al [phone].    We comply with applicable federal civil rights laws and [place] laws. We do not discriminate on the basis of race, color, national origin, age, disability, sex, sexual orientation, or gender identity.            Thank you!     Thank you for choosing [facility]  for your care. Our goal is always to provide you with excellent care. Hearing back from our patients is one way we can continue to improve our services. Please take a few minutes to complete the written survey that you may receive in the mail after your visit with us. Thank you!             Your Updated Medication List - Protect others around you: Learn how to safely use, store and throw away your medicines at www.disposemymeds.org.          This list is accurate as of 11/28/18  9:45 AM.  Always use your most recent med list.                   Brand Name Dispense Instructions for use Diagnosis    escitalopram 20 MG tablet    LEXAPRO    30 tablet    Take 1 tablet (20 mg) by mouth daily Start with 1/2 tab for 2 weeks, then increase to 1 tab.    Severe episode of recurrent major depressive disorder, without psychotic features (H), PABLO (generalized anxiety disorder), Panic attack       IBUPROFEN PO      Take 400 mg by mouth        LORazepam 0.5 MG tablet    ATIVAN    10 tablet    Take 1 tablet (0.5 mg) by mouth every 8 hours as needed for anxiety    PABLO (generalized anxiety disorder), Panic attack       MIRENA (52 MG) 20 MCG/24HR IUD   Generic drug:  levonorgestrel      1 each by Intrauterine route once        sertraline 100 MG tablet    ZOLOFT    90 tablet    Take 1/2 tab (50 mg) x 5-7 days, then increase to 1 tab (100 mg) daily    Severe episode of recurrent major depressive disorder, without psychotic features (H), Obsessive-compulsive disorder, unspecified type, PABLO (generalized anxiety disorder)       triamcinolone 0.1 % external cream    KENALOG    160 g    Apply sparingly to  affected area three times daily as needed for 2 weeks.    Itchy skin

## 2018-11-29 ASSESSMENT — PATIENT HEALTH QUESTIONNAIRE - PHQ9: SUM OF ALL RESPONSES TO PHQ QUESTIONS 1-9: 10

## 2018-11-29 ASSESSMENT — ANXIETY QUESTIONNAIRES: GAD7 TOTAL SCORE: 16

## 2019-01-09 ENCOUNTER — TELEPHONE (OUTPATIENT)
Dept: FAMILY MEDICINE | Facility: CLINIC | Age: 24
End: 2019-01-09

## 2019-01-09 NOTE — TELEPHONE ENCOUNTER
Panel Management Review      Patient has the following on her problem list: None      Composite cancer screening  Chart review shows that this patient is due/due soon for the following None  Summary:    Patient is due/failing the following:   CHLAMYDIA    Action needed:   PATIENT HAS DONE AT [facility]    Type of outreach:    Phone, spoke to patient.  SHE HAS NOT HAD TEST DONE YET. SHE WAS ADVISED TO CALL BACK WITH RESULTS WHEN COMPLETED.    Questions for provider review:    None                                                                                                                                    Ann Dallas LPN     Chart routed to NONE .

## 2019-02-27 DIAGNOSIS — F42.9 OBSESSIVE-COMPULSIVE DISORDER, UNSPECIFIED TYPE: ICD-10-CM

## 2019-02-27 DIAGNOSIS — F41.1 GAD (GENERALIZED ANXIETY DISORDER): ICD-10-CM

## 2019-02-27 DIAGNOSIS — F33.2 SEVERE EPISODE OF RECURRENT MAJOR DEPRESSIVE DISORDER, WITHOUT PSYCHOTIC FEATURES (H): ICD-10-CM

## 2019-02-27 NOTE — TELEPHONE ENCOUNTER
"Requested Prescriptions   Pending Prescriptions Disp Refills     sertraline (ZOLOFT) 100 MG tablet  Last Written Prescription Date:  11/28/2018  Last Fill Quantity: 90 tablet,  # refills: 0   Last office visit: 11/28/2018 with prescribing provider:  Stephy   Future Office Visit:     90 tablet 0     Sig: Take 1/2 tab (50 mg) x 5-7 days, then increase to 1 tab (100 mg) daily    SSRIs Protocol Failed - 2/27/2019  4:22 PM       Failed - PHQ-9 score less than 5 in past 6 months    Please review last PHQ-9 score.   PHQ-9 SCORE 10/19/2017 12/19/2017 11/28/2018   PHQ-9 Total Score MyChart 25 (Severe depression) 7 (Mild depression) 10 (Moderate depression)   PHQ-9 Total Score 26 7 10     PABLO-7 SCORE 10/19/2017 11/28/2018   Total Score - 16 (severe anxiety)   Total Score 21 16            Passed - Medication is active on med list       Passed - Patient is age 18 or older       Passed - No active pregnancy on record       Passed - No positive pregnancy test in last 12 months       Passed - Recent (6 mo) or future (30 days) visit within the authorizing provider's specialty    Patient had office visit in the last 6 months or has a visit in the next 30 days with authorizing provider or within the authorizing provider's specialty.  See \"Patient Info\" tab in inbasket, or \"Choose Columns\" in Meds & Orders section of the refill encounter.               "

## 2019-03-01 RX ORDER — SERTRALINE HYDROCHLORIDE 100 MG/1
100 TABLET, FILM COATED ORAL DAILY
Qty: 30 TABLET | Refills: 0 | Status: SHIPPED | OUTPATIENT
Start: 2019-03-01 | End: 2020-02-03

## 2019-03-01 NOTE — TELEPHONE ENCOUNTER
Routing refill request to provider for review/approval because:  Labs not current:  PHQ9 above 4

## 2019-03-01 NOTE — TELEPHONE ENCOUNTER
"What dose of Sertraline is Shona taking?  She started at 50 mg, then was suppose to increase to 100 mg daily... Is she on 100 mg daily?  Also due for PHQ-9 score but she was suppose to f/u after her last appt in November.  She is also due for STD screening (chlamydia), she told me that she gets it done at [facility]---can we checked to see if she completed this?  Then I can update her chart.    Thanks!  --Dr. Keyes  "

## 2019-03-01 NOTE — TELEPHONE ENCOUNTER
Can we send a Click Contact[employer] message too?  I don;t want her to be out of her med but I am just not sure what dose she is taking...  And those other concerns about PHQ-9 and Chlamydia screening...  Thanks!  --Dr. Keyes

## 2019-03-02 NOTE — TELEPHONE ENCOUNTER
I sent in 30 tabs of the 100 mg.  Hopefully that is the dose she is on... I can give her 90 days once we get those questions I have for her answered (see previous messages)  Thanks!  --Dr. Keyes

## 2019-09-26 ENCOUNTER — TELEPHONE (OUTPATIENT)
Dept: FAMILY MEDICINE | Facility: CLINIC | Age: 24
End: 2019-09-26

## 2019-09-26 ENCOUNTER — OFFICE VISIT (OUTPATIENT)
Dept: FAMILY MEDICINE | Facility: CLINIC | Age: 24
End: 2019-09-26
Payer: COMMERCIAL

## 2019-09-26 VITALS
SYSTOLIC BLOOD PRESSURE: 108 MMHG | DIASTOLIC BLOOD PRESSURE: 72 MMHG | OXYGEN SATURATION: 100 % | HEIGHT: 63 IN | TEMPERATURE: 98.7 F | HEART RATE: 94 BPM | BODY MASS INDEX: 28.88 KG/M2 | WEIGHT: 163 LBS | RESPIRATION RATE: 14 BRPM

## 2019-09-26 DIAGNOSIS — F90.2 ATTENTION DEFICIT HYPERACTIVITY DISORDER (ADHD), COMBINED TYPE: ICD-10-CM

## 2019-09-26 DIAGNOSIS — M25.50 PAIN IN JOINT, MULTIPLE SITES: ICD-10-CM

## 2019-09-26 DIAGNOSIS — Z00.00 ROUTINE GENERAL MEDICAL EXAMINATION AT A HEALTH CARE FACILITY: ICD-10-CM

## 2019-09-26 DIAGNOSIS — Z00.00 ENCOUNTER FOR ROUTINE ADULT HEALTH EXAMINATION WITHOUT ABNORMAL FINDINGS: Primary | ICD-10-CM

## 2019-09-26 DIAGNOSIS — Z11.8 SPECIAL SCREENING EXAMINATION FOR CHLAMYDIAL DISEASE: ICD-10-CM

## 2019-09-26 DIAGNOSIS — N39.3 FEMALE STRESS INCONTINENCE: ICD-10-CM

## 2019-09-26 PROCEDURE — 87491 CHLMYD TRACH DNA AMP PROBE: CPT | Performed by: PHYSICIAN ASSISTANT

## 2019-09-26 PROCEDURE — 99395 PREV VISIT EST AGE 18-39: CPT | Performed by: PHYSICIAN ASSISTANT

## 2019-09-26 PROCEDURE — 99214 OFFICE O/P EST MOD 30 MIN: CPT | Mod: 25 | Performed by: PHYSICIAN ASSISTANT

## 2019-09-26 RX ORDER — LISDEXAMFETAMINE DIMESYLATE 20 MG/1
20 CAPSULE ORAL EVERY MORNING
Qty: 30 CAPSULE | Refills: 0 | Status: SHIPPED | OUTPATIENT
Start: 2019-09-26 | End: 2019-11-29

## 2019-09-26 ASSESSMENT — MIFFLIN-ST. JEOR: SCORE: 1463.49

## 2019-09-26 ASSESSMENT — ENCOUNTER SYMPTOMS
EYES NEGATIVE: 1
CONSTITUTIONAL NEGATIVE: 1
RESPIRATORY NEGATIVE: 1
CARDIOVASCULAR NEGATIVE: 1
NEUROLOGICAL NEGATIVE: 1
GASTROINTESTINAL NEGATIVE: 1

## 2019-09-26 ASSESSMENT — PATIENT HEALTH QUESTIONNAIRE - PHQ9
10. IF YOU CHECKED OFF ANY PROBLEMS, HOW DIFFICULT HAVE THESE PROBLEMS MADE IT FOR YOU TO DO YOUR WORK, TAKE CARE OF THINGS AT HOME, OR GET ALONG WITH OTHER PEOPLE: SOMEWHAT DIFFICULT
SUM OF ALL RESPONSES TO PHQ QUESTIONS 1-9: 3
SUM OF ALL RESPONSES TO PHQ QUESTIONS 1-9: 3

## 2019-09-26 ASSESSMENT — ANXIETY QUESTIONNAIRES
7. FEELING AFRAID AS IF SOMETHING AWFUL MIGHT HAPPEN: NOT AT ALL
GAD7 TOTAL SCORE: 3
GAD7 TOTAL SCORE: 3
6. BECOMING EASILY ANNOYED OR IRRITABLE: NOT AT ALL
2. NOT BEING ABLE TO STOP OR CONTROL WORRYING: NOT AT ALL
1. FEELING NERVOUS, ANXIOUS, OR ON EDGE: NOT AT ALL
3. WORRYING TOO MUCH ABOUT DIFFERENT THINGS: NOT AT ALL
GAD7 TOTAL SCORE: 3
4. TROUBLE RELAXING: MORE THAN HALF THE DAYS
7. FEELING AFRAID AS IF SOMETHING AWFUL MIGHT HAPPEN: NOT AT ALL
5. BEING SO RESTLESS THAT IT IS HARD TO SIT STILL: SEVERAL DAYS

## 2019-09-26 NOTE — TELEPHONE ENCOUNTER
Prior Authorization Retail Medication Request    Medication/Dose:lisdexamfetamine (VYVANSE) 20 MG capsule    ICD code (if different than what is on RX):     Previously Tried and Failed:     Rationale:       Insurance Name:     Insurance ID:         Pharmacy Information (if different than what is on RX)  Name:     Phone:

## 2019-09-26 NOTE — PROGRESS NOTES
SUBJECTIVE:   CC: Shona Aguilar is an 24 year old woman who presents for preventive health visit.     Healthy Habits:     Getting at least 3 servings of Calcium per day:  Yes    Bi-annual eye exam:  Yes    Dental care twice a year:  NO    Sleep apnea or symptoms of sleep apnea:  None    Diet:  Regular (no restrictions)    Frequency of exercise:  4-5 days/week    Duration of exercise:  15-30 minutes    Taking medications regularly:  Not Applicable    Medication side effects:  Not applicable    PHQ-2 Total Score: 0    Additional concerns today:  Yes    PHQ-9 SCORE 12/19/2017 11/28/2018 9/26/2019   PHQ-9 Total Score MyChart 7 (Mild depression) 10 (Moderate depression) 3 (Minimal depression)   PHQ-9 Total Score 7 10 3     PABLO-7 SCORE 10/19/2017 11/28/2018 9/26/2019   Total Score - 16 (severe anxiety) 3 (minimal anxiety)   Total Score 21 16 3     Depression has been much better - stable with therapy only  Did not tolerated medication side effects    -joint pain-wrists and knee, 6 months  Wrist pain when weight bearing  Knee pain and hip pain intermittently   Happens several times a week - very random - will last 5 minutes to an hour  Denies hot swollen wrists, ROM is always normal    -bladder incontinence, more of an issue within the last 3 months, has mentioned that she has always dealt with OAB  History of bladder leakage  Can involve stress (cough, sneeze) but one episode of unprovoked leakage    -wants to restart vyvanse   Previously prescribed by psychiatry  Took it for four years  No problems taking it in the past  Diagnosed with ADHD at age 6      Today's PHQ-2 Score:   PHQ-2 ( 1999 [employer]) 9/26/2019   Q1: Little interest or pleasure in doing things 0   Q2: Feeling down, depressed or hopeless 0   PHQ-2 Score 0   Q1: Little interest or pleasure in doing things Not at all   Q2: Feeling down, depressed or hopeless Not at all   PHQ-2 Score 0       Abuse: Current or Past(Physical, Sexual or Emotional)- No  Do you  feel safe in your environment? Yes    Social History     Tobacco Use     Smoking status: Never Smoker     Smokeless tobacco: Never Used   Substance Use Topics     Alcohol use: Yes     Comment: socially     If you drink alcohol do you typically have >3 drinks per day or >7 drinks per week? No    Alcohol Use 9/26/2019   Prescreen: >3 drinks/day or >7 drinks/week? No   Prescreen: >3 drinks/day or >7 drinks/week? -   No flowsheet data found.    Reviewed orders with patient.  Reviewed health maintenance and updated orders accordingly - Yes  BP Readings from Last 3 Encounters:   09/26/19 108/72   11/28/18 110/72   12/19/17 110/78    Wt Readings from Last 3 Encounters:   09/26/19 73.9 kg (163 lb)   11/28/18 77.6 kg (171 lb)   12/19/17 71.7 kg (158 lb)                  Patient Active Problem List   Diagnosis     Severe episode of recurrent major depressive disorder, without psychotic features (H)     PABLO (generalized anxiety disorder)     Panic attack     Disorganized thinking     OCD (obsessive compulsive disorder)     Past Surgical History:   Procedure Laterality Date     ENT SURGERY      tonsillectomy; ear tubes     HEAD & NECK SURGERY      wisdom teeth extraction       Social History     Tobacco Use     Smoking status: Never Smoker     Smokeless tobacco: Never Used   Substance Use Topics     Alcohol use: Yes     Comment: socially     Family History   Problem Relation Age of Onset     Depression Mother      Anxiety Disorder Mother      Mental Illness Mother         OCD     Attention Deficit Disorder Mother      Mental Illness Father      Anxiety Disorder Father      Depression Father      Attention Deficit Disorder Father      Alcoholism Maternal Grandmother      Alcoholism Maternal Grandfather      Substance Abuse Maternal Uncle      Schizophrenia Maternal Uncle      Diabetes No family hx of      Coronary Artery Disease No family hx of      Hypertension No family hx of      Hyperlipidemia No family hx of       Cerebrovascular Disease No family hx of      Breast Cancer No family hx of      Colon Cancer No family hx of      Prostate Cancer No family hx of      Other Cancer No family hx of      Anesthesia Reaction No family hx of      Asthma No family hx of      Osteoporosis No family hx of      Genetic Disorder No family hx of      Thyroid Disease No family hx of      Obesity No family hx of      Unknown/Adopted No family hx of          Current Outpatient Medications   Medication Sig Dispense Refill     levonorgestrel (MIRENA, 52 MG,) 20 MCG/24HR IUD 1 each by Intrauterine route once       lisdexamfetamine (VYVANSE) 20 MG capsule Take 1 capsule (20 mg) by mouth every morning 30 capsule 0     escitalopram (LEXAPRO) 20 MG tablet Take 1 tablet (20 mg) by mouth daily Start with 1/2 tab for 2 weeks, then increase to 1 tab. (Patient not taking: Reported on 11/28/2018) 30 tablet 0     IBUPROFEN PO Take 400 mg by mouth        LORazepam (ATIVAN) 0.5 MG tablet Take 1 tablet (0.5 mg) by mouth every 8 hours as needed for anxiety (Patient not taking: Reported on 9/26/2019) 10 tablet 0     sertraline (ZOLOFT) 100 MG tablet Take 1 tablet (100 mg) by mouth daily (Patient not taking: Reported on 9/26/2019) 30 tablet 0     triamcinolone (KENALOG) 0.1 % cream Apply sparingly to affected area three times daily as needed for 2 weeks. (Patient not taking: Reported on 11/28/2018) 160 g 3     Allergies   Allergen Reactions     Latex Rash       Mammogram not appropriate for this patient based on age.    Pertinent mammograms are reviewed under the imaging tab.  History of abnormal Pap smear: NO - age 21-29 PAP every 3 years recommended  PAP / HPV 10/19/2017   PAP NIL     Reviewed and updated as needed this visit by clinical staff  Tobacco  Allergies  Meds  Med Hx  Surg Hx  Fam Hx  Soc Hx        Reviewed and updated as needed this visit by Provider            Review of Systems   Constitutional: Negative.    HENT: Negative.    Eyes: Negative.   "  Respiratory: Negative.    Cardiovascular: Negative.    Gastrointestinal: Negative.    Genitourinary:        As in HPI   Musculoskeletal:        As in HPI   Skin: Negative.    Neurological: Negative.    Psychiatric/Behavioral:        As in HPI          OBJECTIVE:   /72   Pulse 94   Temp 98.7  F (37.1  C) (Tympanic)   Resp 14   Ht 1.6 m (5' 3\")   Wt 73.9 kg (163 lb)   SpO2 100%   BMI 28.87 kg/m    Physical Exam  Constitutional:       General: She is not in acute distress.     Appearance: She is well-developed.   HENT:      Right Ear: Tympanic membrane and external ear normal.      Left Ear: Tympanic membrane and external ear normal.      Nose: Nose normal.      Mouth/Throat:      Pharynx: No oropharyngeal exudate.   Eyes:      General:         Right eye: No discharge.         Left eye: No discharge.      Conjunctiva/sclera: Conjunctivae normal.      Pupils: Pupils are equal, round, and reactive to light.   Neck:      Musculoskeletal: Neck supple.      Thyroid: No thyromegaly.      Trachea: No tracheal deviation.   Cardiovascular:      Rate and Rhythm: Normal rate and regular rhythm.      Pulses: Normal pulses.      Heart sounds: Normal heart sounds, S1 normal and S2 normal. No murmur. No friction rub. No S3 or S4 sounds.    Pulmonary:      Effort: Pulmonary effort is normal. No respiratory distress.      Breath sounds: Normal breath sounds. No wheezing or rales.   Chest:      Breasts:         Right: No mass, nipple discharge or tenderness.         Left: No mass, nipple discharge or tenderness.   Abdominal:      Palpations: Abdomen is soft. There is no mass.      Tenderness: There is no tenderness.   Musculoskeletal:      Right wrist: She exhibits normal range of motion, no tenderness, no bony tenderness and no swelling.      Left wrist: She exhibits normal range of motion, no tenderness, no bony tenderness and no swelling.      Right hip: She exhibits normal range of motion and no bony tenderness.      " "Left hip: She exhibits normal range of motion and no bony tenderness.      Right knee: She exhibits normal range of motion, no swelling, no LCL laxity, normal meniscus and no MCL laxity. No tenderness found.      Left knee: She exhibits normal range of motion, no swelling, no LCL laxity, normal meniscus and no MCL laxity. No tenderness found.   Lymphadenopathy:      Cervical: No cervical adenopathy.   Skin:     General: Skin is warm and dry.      Findings: No rash.   Neurological:      Mental Status: She is alert and oriented to person, place, and time.      Motor: No abnormal muscle tone.      Deep Tendon Reflexes: Reflexes are normal and symmetric.   Psychiatric:         Thought Content: Thought content normal.         Judgement: Judgment normal.           Diagnostic Test Results:  No results found for this or any previous visit (from the past 24 hour(s)).    ASSESSMENT/PLAN:       ICD-10-CM    1. Encounter for routine adult health examination without abnormal findings Z00.00    2. Special screening examination for chlamydial disease Z11.8 Chlamydia trachomatis PCR   3. Routine general medical examination at a health care facility Z00.00    4. Female stress incontinence N39.3 PHYSICAL THERAPY REFERRAL   5. Attention deficit hyperactivity disorder (ADHD), combined type F90.2 lisdexamfetamine (VYVANSE) 20 MG capsule   6. Pain in joint, multiple sites M25.50         Patient declines flu shot today      COUNSELING:  Reviewed preventive health counseling, as reflected in patient instructions    Estimated body mass index is 28.87 kg/m  as calculated from the following:    Height as of this encounter: 1.6 m (5' 3\").    Weight as of this encounter: 73.9 kg (163 lb).    Weight management plan: Discussed healthy diet and exercise guidelines     reports that she has never smoked. She has never used smokeless tobacco.      Counseling Resources:  ATP IV Guidelines  Pooled Cohorts Equation Calculator  Breast Cancer Risk " Calculator  FRAX Risk Assessment  ICSI Preventive Guidelines  Dietary Guidelines for Americans, 2010  USDA's MyPlate  ASA Prophylaxis  Lung CA Screening    Lin Brown PA-C  [facility]  Answers for HPI/ROS submitted by the patient on 9/26/2019   Annual Exam:  If you checked off any problems, how difficult have these problems made it for you to do your work, take care of things at home, or get along with other people?: Somewhat difficult  PHQ9 TOTAL SCORE: 3  PABLO 7 TOTAL SCORE: 3

## 2019-09-26 NOTE — LETTER
10/4/2019    INSURER: Payor: [employer] / Plan: [employer] / Product Type: HMO /   ATTN: CLINICAL APPEALS DEPARTMENT   [facility]   [address] [address]  [place] [address]   Phone: [phone]   Fax: [phone]  Re: Prior Authorization Request  Patient: Shona Aguilar  Policy ID#:  [id number]  : 1995      To Whom it May Concern:    I am writing to formally request a prior authorization of coverage for my patient,  Shona Aguilar, for treatment using lisdexamfetamine (Vyvanse) 20 mg capsule.  I am requesting authorization for applicable provider professional and facility services associated with this therapy.    The therapy involves taking this medication daily for an indefinite amount of time - likely will need this chronically.    The benefits of the therapy include - improved concentration and increases likelihood she will maintain employment.  Without the medication she has not been able to maintain employment.       I have treated Shona Aguilar since 2019 and I have determined that it is medically appropriate for  this patient to receive be treated with lisdexamfetamine (Vyvanse) 20 mg capsule for the reason(s) stated below:      Patient was diagnosed with ADHD as a child.  This medication has worked well for her in the past.  She has not been taking the medication for some time, and without it she has noticed negative impacts on her ability to work and maintain employment.       Patient has tried and failed: amphetamine-dextroamphetamine XR, methylphenidate XR, methylphenidate SR      This is the most appropriate treatment for her because she has tried other similar medications in the past and failed on those therapies.       If she does not have approval for the medication, she may not be able to maintain employment.  This has a significant impact on her quality of life and financial stability.     I have included medical records pertaining to the patient s  medical history, current condition and treatment plan.  In addition, the following billing codes will be used for therapy and follow-up:   Attention deficit hyperactivity disorder (ADHD), combined type F90.2         I firmly believe that this therapy is clinically appropriate and that Shona Aguilar would benefit from improved improved quality of life and ability to maintain a job if allowed the opportunity to receive this treatment.  Please contact me at Dept: [phone] if you require additional information to ensure the prompt approval for coverage.    Please send your written decision to me at this address:  [address] [address]  [phone]  Dept: [phone]      Sincerely,      ZULEYMA Mancillaosures

## 2019-09-27 LAB
C TRACH DNA SPEC QL NAA+PROBE: NEGATIVE
SPECIMEN SOURCE: NORMAL

## 2019-09-27 ASSESSMENT — ANXIETY QUESTIONNAIRES: GAD7 TOTAL SCORE: 3

## 2019-09-27 NOTE — RESULT ENCOUNTER NOTE
Shona    Your lab tests are complete and I have reviewed the results.     - Your chlamydia test was negative for infection.    If you have any questions or concerns, please feel free to call or send a [email or web address] message.    Sincerely,  Dionisio Brown PA-C

## 2019-09-30 NOTE — TELEPHONE ENCOUNTER
Central Prior Authorization Team   Phone: [phone]    PA Initiation    Medication: lisdexamfetamine (VYVANSE) 20 MG capsule  Insurance Company: [employer] - Phone [phone] Fax [phone]  Pharmacy Filling the Rx: [id number] [place], [place] - [address]   Filling Pharmacy Phone: [phone]  Filling Pharmacy Fax:    Start Date: 9/30/2019

## 2019-10-03 ENCOUNTER — TELEPHONE (OUTPATIENT)
Dept: FAMILY MEDICINE | Facility: CLINIC | Age: 24
End: 2019-10-03

## 2019-10-03 NOTE — TELEPHONE ENCOUNTER
If you would like to appeal denial, please write a letter of necessity and route this entire encounter to [facility] [place] for them to complete appeal. If you would like to change medication or have pt pay out of pocket, please send to the care team so they can call and can notify pt.

## 2019-10-03 NOTE — TELEPHONE ENCOUNTER
Call from pharmacy    lisdexamfetamine (VYVANSE) 20 MG capsule needs to have a PA initiated.     Prior Authorization Specialty Medication Request    Medication/Dose:   ICD code (if different than what is on RX):    Previously Tried and Failed:      Important Lab Values:   Rationale:     Insurance Name:   Insurance ID:   Insurance Phone Number:     Pharmacy Information (if different than what is on RX)  Name:    Phone:      Sent to the PA team for follow up

## 2019-10-03 NOTE — TELEPHONE ENCOUNTER
Central Prior Authorization Team   Phone: [phone]    PRIOR AUTHORIZATION DENIED    Medication: lisdexamfetamine (VYVANSE) 20 MG capsule    Denial Date: 10/1/2019    Denial Rational: Called insurance to get denial reasoning, patient has to first try/fail: amphetamine-dextroamphetamine XR, dexmethylphenidate XR, dextroamphetamine XR, methylphenidate XR, Metadate ER, or methylphenidate SR.    Appeal Information: If provider would like to appeal we will need a detailed letter of medical necessity to start the process. Then re-route this request back to the PA pool.     ATTN: CLINICAL APPEALS DEPARTMENT   [facility]   [address] [address]  [place] [address]   Phone: [phone]   Fax: [phone]

## 2019-10-08 NOTE — TELEPHONE ENCOUNTER
Central Prior Authorization Team   Phone: [phone]    Medication Appeal Initiation    We have initiated an appeal for the requested medication:  Medication: lisdexamfetamine (VYVANSE) 20 MG capsule  Appeal Start Date:  10/8/2019  Insurance Company: [employer] - Phone [phone] Fax [phone]  Comments:       Faxed letter of medical necessity to insurance.

## 2019-10-15 NOTE — TELEPHONE ENCOUNTER
Central Prior Authorization Team   Phone: [phone]    MEDICATION APPEAL APPROVED    Medication: lisdexamfetamine (VYVANSE) 20 MG capsule  Authorization Effective Date: 9/24/2019  Authorization Expiration Date: 10/8/2020  Approved Dose/Quantity:   Reference #: [id number]   Insurance Company: [employer] - Phone [phone] Fax [phone]  Expected CoPay:       CoPay Card Available:      Foundation Assistance Needed:    Which Pharmacy is filling the prescription (Not needed for infusion/clinic administered): [id number] [place] [place] [facility]  **Instructed pharmacy to notify patient when script is ready to /ship.**

## 2019-11-21 ENCOUNTER — TELEPHONE (OUTPATIENT)
Dept: FAMILY MEDICINE | Facility: CLINIC | Age: 24
End: 2019-11-21

## 2019-11-21 NOTE — TELEPHONE ENCOUNTER
Panel Management Review      Patient has the following on her problem list:     Depression / Dysthymia review    Measure:  Needs PHQ-9 score of 4 or less during index window.  Administer PHQ-9 and if score is 5 or more, send encounter to provider for next steps.    5 - 7 month window range: yes    PHQ-9 SCORE 12/19/2017 11/28/2018 9/26/2019   PHQ-9 Total Score MyChart 7 (Mild depression) 10 (Moderate depression) 3 (Minimal depression)   PHQ-9 Total Score 7 10 3       If PHQ-9 recheck is 5 or more, route to provider for next steps.    Patient is due for:  PHQ9      Composite cancer screening  Chart review shows that this patient is due/due soon for the following None  Summary:    Patient is due/failing the following:   PHQ9    Action needed:   Patient needs to do PHQ9.    Type of outreach:    Sent [employer] message.    Questions for provider review:    None

## 2020-01-02 ENCOUNTER — MYC REFILL (OUTPATIENT)
Dept: FAMILY MEDICINE | Facility: CLINIC | Age: 25
End: 2020-01-02

## 2020-01-02 DIAGNOSIS — R41.89 DISORGANIZED THINKING: ICD-10-CM

## 2020-01-02 DIAGNOSIS — F90.2 ATTENTION DEFICIT HYPERACTIVITY DISORDER (ADHD), COMBINED TYPE: ICD-10-CM

## 2020-01-03 PROBLEM — R41.89 DISORGANIZED THINKING: Status: ACTIVE | Noted: 2017-10-24

## 2020-01-03 RX ORDER — LISDEXAMFETAMINE DIMESYLATE 20 MG/1
20 CAPSULE ORAL EVERY MORNING
Qty: 30 CAPSULE | Refills: 0 | Status: SHIPPED | OUTPATIENT
Start: 2020-01-03 | End: 2020-02-03 | Stop reason: DRUGHIGH

## 2020-01-03 NOTE — TELEPHONE ENCOUNTER
Requested Prescriptions   Pending Prescriptions Disp Refills     lisdexamfetamine (VYVANSE) 20 MG capsule 30 capsule 0     Sig: Take 1 capsule (20 mg) by mouth every morning   Last Written Prescription Date:  11/29/19  Last Fill Quantity: 30,  # refills: 0   Last office visit: 9/26/2019 with prescribing provider:  Fitterer   Future Office Visit:        There is no refill protocol information for this order        [place] reviewed:  reviewed- no concerns 01/03/20    [place]:  [email or web address]

## 2020-01-05 ENCOUNTER — MYC REFILL (OUTPATIENT)
Dept: FAMILY MEDICINE | Facility: CLINIC | Age: 25
End: 2020-01-05

## 2020-01-05 DIAGNOSIS — F90.2 ATTENTION DEFICIT HYPERACTIVITY DISORDER (ADHD), COMBINED TYPE: ICD-10-CM

## 2020-01-05 RX ORDER — LISDEXAMFETAMINE DIMESYLATE 20 MG/1
20 CAPSULE ORAL EVERY MORNING
Qty: 30 CAPSULE | Refills: 0 | Status: CANCELLED | OUTPATIENT
Start: 2020-01-05

## 2020-02-03 ENCOUNTER — OFFICE VISIT (OUTPATIENT)
Dept: FAMILY MEDICINE | Facility: CLINIC | Age: 25
End: 2020-02-03
Payer: COMMERCIAL

## 2020-02-03 VITALS
WEIGHT: 162 LBS | RESPIRATION RATE: 14 BRPM | HEART RATE: 75 BPM | DIASTOLIC BLOOD PRESSURE: 66 MMHG | BODY MASS INDEX: 28.7 KG/M2 | HEIGHT: 63 IN | SYSTOLIC BLOOD PRESSURE: 120 MMHG | TEMPERATURE: 98.2 F

## 2020-02-03 DIAGNOSIS — F42.9 OBSESSIVE-COMPULSIVE DISORDER, UNSPECIFIED TYPE: ICD-10-CM

## 2020-02-03 DIAGNOSIS — L29.9 ITCHY SKIN: ICD-10-CM

## 2020-02-03 DIAGNOSIS — R10.84 ABDOMINAL PAIN, GENERALIZED: ICD-10-CM

## 2020-02-03 DIAGNOSIS — F33.42 RECURRENT MAJOR DEPRESSIVE DISORDER, IN FULL REMISSION (H): ICD-10-CM

## 2020-02-03 DIAGNOSIS — F90.2 ATTENTION DEFICIT HYPERACTIVITY DISORDER (ADHD), COMBINED TYPE: Primary | ICD-10-CM

## 2020-02-03 DIAGNOSIS — R19.7 DIARRHEA, UNSPECIFIED TYPE: ICD-10-CM

## 2020-02-03 DIAGNOSIS — F41.0 PANIC ATTACK: ICD-10-CM

## 2020-02-03 DIAGNOSIS — F41.1 GAD (GENERALIZED ANXIETY DISORDER): ICD-10-CM

## 2020-02-03 PROCEDURE — 99214 OFFICE O/P EST MOD 30 MIN: CPT | Performed by: PHYSICIAN ASSISTANT

## 2020-02-03 RX ORDER — LISDEXAMFETAMINE DIMESYLATE 30 MG/1
30 CAPSULE ORAL DAILY
Qty: 30 CAPSULE | Refills: 0 | Status: SHIPPED | OUTPATIENT
Start: 2020-03-05 | End: 2020-04-04

## 2020-02-03 RX ORDER — LISDEXAMFETAMINE DIMESYLATE 30 MG/1
30 CAPSULE ORAL DAILY
Qty: 30 CAPSULE | Refills: 0 | Status: SHIPPED | OUTPATIENT
Start: 2020-04-05 | End: 2020-05-05

## 2020-02-03 RX ORDER — LISDEXAMFETAMINE DIMESYLATE 30 MG/1
30 CAPSULE ORAL DAILY
Qty: 30 CAPSULE | Refills: 0 | Status: SHIPPED | OUTPATIENT
Start: 2020-02-03 | End: 2020-03-03

## 2020-02-03 ASSESSMENT — ENCOUNTER SYMPTOMS
MUSCULOSKELETAL NEGATIVE: 1
NEUROLOGICAL NEGATIVE: 1
RESPIRATORY NEGATIVE: 1
ROS GI COMMENTS: AS IN HPI
CONSTITUTIONAL NEGATIVE: 1
CARDIOVASCULAR NEGATIVE: 1
EYES NEGATIVE: 1

## 2020-02-03 ASSESSMENT — ANXIETY QUESTIONNAIRES
3. WORRYING TOO MUCH ABOUT DIFFERENT THINGS: SEVERAL DAYS
7. FEELING AFRAID AS IF SOMETHING AWFUL MIGHT HAPPEN: SEVERAL DAYS
5. BEING SO RESTLESS THAT IT IS HARD TO SIT STILL: NOT AT ALL
GAD7 TOTAL SCORE: 6
1. FEELING NERVOUS, ANXIOUS, OR ON EDGE: SEVERAL DAYS
GAD7 TOTAL SCORE: 6
2. NOT BEING ABLE TO STOP OR CONTROL WORRYING: SEVERAL DAYS
6. BECOMING EASILY ANNOYED OR IRRITABLE: MORE THAN HALF THE DAYS
7. FEELING AFRAID AS IF SOMETHING AWFUL MIGHT HAPPEN: SEVERAL DAYS
GAD7 TOTAL SCORE: 6
4. TROUBLE RELAXING: NOT AT ALL

## 2020-02-03 ASSESSMENT — PATIENT HEALTH QUESTIONNAIRE - PHQ9
SUM OF ALL RESPONSES TO PHQ QUESTIONS 1-9: 1
SUM OF ALL RESPONSES TO PHQ QUESTIONS 1-9: 1
10. IF YOU CHECKED OFF ANY PROBLEMS, HOW DIFFICULT HAVE THESE PROBLEMS MADE IT FOR YOU TO DO YOUR WORK, TAKE CARE OF THINGS AT HOME, OR GET ALONG WITH OTHER PEOPLE: NOT DIFFICULT AT ALL

## 2020-02-03 ASSESSMENT — MIFFLIN-ST. JEOR: SCORE: 1458.96

## 2020-02-03 NOTE — PROGRESS NOTES
Subjective     Shona Aguilar is a 24 year old female who presents to clinic today for the following health issues:    HPI   Medication Followup of Vyvanse    Taking Medication as prescribed: yes    Side Effects:  Gi distress started 1 month after starting vyvanse    Medication Helping Symptoms:  yes     PHQ-9 SCORE 9/26/2019 11/21/2019 2/3/2020   PHQ-9 Total Score MyChart 3 (Minimal depression) 3 (Minimal depression) 1 (Minimal depression)   PHQ-9 Total Score 3 3 1     PABLO-7 SCORE 11/28/2018 9/26/2019 2/3/2020   Total Score 16 (severe anxiety) 3 (minimal anxiety) 6 (mild anxiety)   Total Score 16 3 6     Depression and anxiety are well controlled with Vyvanse  She has intermittent diarrhea and abdominal pain (several days prior to her period and again mid cycle) which started after she started the Vyvanse.  She also has a Mirena IUD.    The Vyvanse wears off around 3:00 PM every day        Patient Active Problem List   Diagnosis     Severe episode of recurrent major depressive disorder, without psychotic features (H)     PABLO (generalized anxiety disorder)     Panic attack     Disorganized thinking     OCD (obsessive compulsive disorder)     Past Surgical History:   Procedure Laterality Date     ENT SURGERY      tonsillectomy; ear tubes     HEAD & NECK SURGERY      wisdom teeth extraction       Social History     Tobacco Use     Smoking status: Never Smoker     Smokeless tobacco: Never Used   Substance Use Topics     Alcohol use: Yes     Comment: socially     Family History   Problem Relation Age of Onset     Depression Mother      Anxiety Disorder Mother      Mental Illness Mother         OCD     Attention Deficit Disorder Mother      Mental Illness Father      Anxiety Disorder Father      Depression Father      Attention Deficit Disorder Father      Alcoholism Maternal Grandmother      Alcoholism Maternal Grandfather      Substance Abuse Maternal Uncle      Schizophrenia Maternal Uncle      Diabetes No family hx  "of      Coronary Artery Disease No family hx of      Hypertension No family hx of      Hyperlipidemia No family hx of      Cerebrovascular Disease No family hx of      Breast Cancer No family hx of      Colon Cancer No family hx of      Prostate Cancer No family hx of      Other Cancer No family hx of      Anesthesia Reaction No family hx of      Asthma No family hx of      Osteoporosis No family hx of      Genetic Disorder No family hx of      Thyroid Disease No family hx of      Obesity No family hx of      Unknown/Adopted No family hx of          Current Outpatient Medications   Medication Sig Dispense Refill     IBUPROFEN PO Take 400 mg by mouth        levonorgestrel (MIRENA, 52 MG,) 20 MCG/24HR IUD 1 each by Intrauterine route once       lisdexamfetamine (VYVANSE) 30 MG capsule Take 1 capsule (30 mg) by mouth daily 30 capsule 0     [START ON 3/5/2020] lisdexamfetamine (VYVANSE) 30 MG capsule Take 1 capsule (30 mg) by mouth daily 30 capsule 0     [START ON 4/5/2020] lisdexamfetamine (VYVANSE) 30 MG capsule Take 1 capsule (30 mg) by mouth daily 30 capsule 0     Allergies   Allergen Reactions     Latex Rash       Reviewed and updated as needed this visit by Provider         Review of Systems   Constitutional: Negative.    HENT: Negative.    Eyes: Negative.    Respiratory: Negative.    Cardiovascular: Negative.    Gastrointestinal:        As in HPI   Genitourinary: Negative.    Musculoskeletal: Negative.    Skin: Negative.    Neurological: Negative.    Psychiatric/Behavioral:        As in HPI         Objective    /66   Pulse 75   Temp 98.2  F (36.8  C) (Tympanic)   Resp 14   Ht 1.6 m (5' 3\")   Wt 73.5 kg (162 lb)   BMI 28.70 kg/m    Physical Exam  Constitutional:       General: She is not in acute distress.     Appearance: She is well-developed. She is not diaphoretic.   HENT:      Head: Normocephalic.      Right Ear: External ear normal.      Left Ear: External ear normal.      Nose: Nose normal. "   Eyes:      Conjunctiva/sclera: Conjunctivae normal.   Neck:      Musculoskeletal: Normal range of motion.   Pulmonary:      Effort: Pulmonary effort is normal.   Skin:     General: Skin is warm and dry.   Neurological:      Mental Status: She is alert and oriented to person, place, and time.   Psychiatric:         Judgment: Judgment normal.         Diagnostic Test Results:  No results found for this or any previous visit (from the past 24 hour(s)).        Assessment & Plan   Problem List Items Addressed This Visit        Behavioral    PABLO (generalized anxiety disorder)    OCD (obsessive compulsive disorder)    Severe episode of recurrent major depressive disorder, without psychotic features (H)       Other    Panic attack      Other Visit Diagnoses     Attention deficit hyperactivity disorder (ADHD), combined type    -  Primary    Relevant Medications    lisdexamfetamine (VYVANSE) 30 MG capsule    lisdexamfetamine (VYVANSE) 30 MG capsule (Start on 3/5/2020)    lisdexamfetamine (VYVANSE) 30 MG capsule (Start on 4/5/2020)    Itchy skin        Abdominal pain, generalized        Diarrhea, unspecified type             For the cyclical abdominal pain and diarrhea, I would like her to track the flares to see if they truly align with her menstrual cycle.  Next steps - imaging (US) to evaluate IUD, Uterus, ovaries    Increased Vyvanse dose to 30 mg daily   Recheck in 6 months      There are no Patient Instructions on file for this visit.    Return in about 6 months (around 8/3/2020) for Medication Recheck.    Lin Brown PA-C  [facility]      Answers for HPI/ROS submitted by the patient on 2/3/2020   If you checked off any problems, how difficult have these problems made it for you to do your work, take care of things at home, or get along with other people?: Not difficult at all  PHQ9 TOTAL SCORE: 1  PABLO 7 TOTAL SCORE: 6

## 2020-02-04 ASSESSMENT — ANXIETY QUESTIONNAIRES: GAD7 TOTAL SCORE: 6

## 2020-02-04 ASSESSMENT — PATIENT HEALTH QUESTIONNAIRE - PHQ9: SUM OF ALL RESPONSES TO PHQ QUESTIONS 1-9: 1

## 2020-07-02 ENCOUNTER — MYC REFILL (OUTPATIENT)
Dept: FAMILY MEDICINE | Facility: CLINIC | Age: 25
End: 2020-07-02

## 2020-07-02 DIAGNOSIS — R41.89 DISORGANIZED THINKING: ICD-10-CM

## 2020-07-02 DIAGNOSIS — F90.2 ATTENTION DEFICIT HYPERACTIVITY DISORDER (ADHD), COMBINED TYPE: ICD-10-CM

## 2020-07-02 RX ORDER — LISDEXAMFETAMINE DIMESYLATE 30 MG/1
30 CAPSULE ORAL DAILY
Qty: 30 CAPSULE | Refills: 0 | Status: CANCELLED | OUTPATIENT
Start: 2020-07-02

## 2020-09-03 ENCOUNTER — MYC REFILL (OUTPATIENT)
Dept: FAMILY MEDICINE | Facility: CLINIC | Age: 25
End: 2020-09-03

## 2020-09-03 DIAGNOSIS — R41.89 DISORGANIZED THINKING: ICD-10-CM

## 2020-09-03 DIAGNOSIS — F90.2 ATTENTION DEFICIT HYPERACTIVITY DISORDER (ADHD), COMBINED TYPE: ICD-10-CM

## 2020-09-03 NOTE — TELEPHONE ENCOUNTER
Controlled Substance Refill Request for Vyvanse  Problem List Complete:  Yes  Disorganized thinking   10/24/2017     Overview    [place] reviewed:  reviewed June 4, 2020- no concerns  [place]:  [email or web address]      checked in past 3 months?  Yes June 4, 2020- no concerns    Routing refill request to provider for review/approval because:  Drug not on the [facility] refill protocol

## 2020-09-04 RX ORDER — LISDEXAMFETAMINE DIMESYLATE 30 MG/1
30 CAPSULE ORAL DAILY
Qty: 30 CAPSULE | Refills: 0 | Status: SHIPPED | OUTPATIENT
Start: 2020-09-04 | End: 2020-11-19

## 2020-09-04 NOTE — TELEPHONE ENCOUNTER
Called pt and let her know that she is due for a visit, she does not know her schedule yet but will call back to make appt

## 2020-10-02 ENCOUNTER — VIRTUAL VISIT (OUTPATIENT)
Dept: FAMILY MEDICINE | Facility: CLINIC | Age: 25
End: 2020-10-02
Payer: COMMERCIAL

## 2020-10-02 DIAGNOSIS — F90.2 ATTENTION DEFICIT HYPERACTIVITY DISORDER (ADHD), COMBINED TYPE: Primary | ICD-10-CM

## 2020-10-02 PROCEDURE — 99213 OFFICE O/P EST LOW 20 MIN: CPT | Mod: 95 | Performed by: PHYSICIAN ASSISTANT

## 2020-10-02 RX ORDER — LISDEXAMFETAMINE DIMESYLATE 30 MG/1
30 CAPSULE ORAL DAILY
Qty: 30 CAPSULE | Refills: 0 | Status: SHIPPED | OUTPATIENT
Start: 2020-10-02 | End: 2020-11-01

## 2020-10-02 RX ORDER — LISDEXAMFETAMINE DIMESYLATE 30 MG/1
30 CAPSULE ORAL DAILY
Qty: 30 CAPSULE | Refills: 0 | Status: SHIPPED | OUTPATIENT
Start: 2020-12-03 | End: 2020-11-19

## 2020-10-02 RX ORDER — LISDEXAMFETAMINE DIMESYLATE 30 MG/1
30 CAPSULE ORAL DAILY
Qty: 30 CAPSULE | Refills: 0 | Status: SHIPPED | OUTPATIENT
Start: 2020-11-02 | End: 2021-01-06

## 2020-10-02 ASSESSMENT — ENCOUNTER SYMPTOMS
CARDIOVASCULAR NEGATIVE: 1
MUSCULOSKELETAL NEGATIVE: 1
RESPIRATORY NEGATIVE: 1
GASTROINTESTINAL NEGATIVE: 1
NEUROLOGICAL NEGATIVE: 1
EYES NEGATIVE: 1
CONSTITUTIONAL NEGATIVE: 1

## 2020-10-02 ASSESSMENT — PATIENT HEALTH QUESTIONNAIRE - PHQ9
5. POOR APPETITE OR OVEREATING: NOT AT ALL
SUM OF ALL RESPONSES TO PHQ QUESTIONS 1-9: 2

## 2020-10-02 ASSESSMENT — ANXIETY QUESTIONNAIRES
5. BEING SO RESTLESS THAT IT IS HARD TO SIT STILL: NOT AT ALL
7. FEELING AFRAID AS IF SOMETHING AWFUL MIGHT HAPPEN: NOT AT ALL
2. NOT BEING ABLE TO STOP OR CONTROL WORRYING: NOT AT ALL
1. FEELING NERVOUS, ANXIOUS, OR ON EDGE: NOT AT ALL
GAD7 TOTAL SCORE: 0
3. WORRYING TOO MUCH ABOUT DIFFERENT THINGS: NOT AT ALL
6. BECOMING EASILY ANNOYED OR IRRITABLE: NOT AT ALL

## 2020-10-02 NOTE — PROGRESS NOTES
"Shona Aguilar is a 25 year old female who is being evaluated via a billable telephone visit.      The patient has been notified of following:     \"This telephone visit will be conducted via a call between you and your physician/provider. We have found that certain health care needs can be provided without the need for a physical exam.  This service lets us provide the care you need with a short phone conversation.  If a prescription is necessary we can send it directly to your pharmacy.  If lab work is needed we can place an order for that and you can then stop by our lab to have the test done at a later time.    Telephone visits are billed at different rates depending on your insurance coverage. During this emergency period, for some insurers they may be billed the same as an in-person visit.  Please reach out to your insurance provider with any questions.    If during the course of the call the physician/provider feels a telephone visit is not appropriate, you will not be charged for this service.\"    Patient has given verbal consent for Telephone visit?  Yes    What phone number would you like to be contacted at? [phone]    How would you like to obtain your AVS? Sunilhart    Subjective     Shona Aguilar is a 25 year old female who presents via phone visit today for the following health issues:    HPI     Medication Followup of vyvanse    Taking Medication as prescribed: yes    Side Effects:  None    Medication Helping Symptoms:  yes     Taking it every day  Sleep is good            Review of Systems   Constitutional: Negative.    HENT: Negative.    Eyes: Negative.    Respiratory: Negative.    Cardiovascular: Negative.    Gastrointestinal: Negative.    Genitourinary: Negative.    Musculoskeletal: Negative.    Skin: Negative.    Neurological: Negative.    Psychiatric/Behavioral:        As in HPI             Objective          Vitals:  No vitals were obtained today due to virtual visit.    healthy, alert and " "no distress  PSYCH: Alert and oriented times 3; coherent speech, normal   rate and volume, able to articulate logical thoughts, able   to abstract reason, no tangential thoughts, no hallucinations   or delusions  Her affect is normal  RESP: No cough, no audible wheezing, able to talk in full sentences  Remainder of exam unable to be completed due to telephone visits            Assessment/Plan:    Assessment & Plan   Problem List Items Addressed This Visit     None      Visit Diagnoses     Attention deficit hyperactivity disorder (ADHD), combined type    -  Primary    Relevant Medications    lisdexamfetamine (VYVANSE) 30 MG capsule    lisdexamfetamine (VYVANSE) 30 MG capsule (Start on 11/2/2020)    lisdexamfetamine (VYVANSE) 30 MG capsule (Start on 12/3/2020)             BMI:   Estimated body mass index is 28.7 kg/m  as calculated from the following:    Height as of 2/3/20: 1.6 m (5' 3\").    Weight as of 2/3/20: 73.5 kg (162 lb).   Weight management plan: Discussed healthy diet and exercise guidelines             Return in about 4 weeks (around 10/30/2020) for Preventive Care Visit with Pap.    Lin Brown PA-C  [facility]    Phone call duration:  4 minutes              "

## 2020-10-03 ASSESSMENT — ANXIETY QUESTIONNAIRES: GAD7 TOTAL SCORE: 0

## 2020-11-18 NOTE — PROGRESS NOTES
SUBJECTIVE:   CC: Shona Aguilar is an 25 year old woman who presents for preventive health visit.       Patient has been advised of split billing requirements and indicates understanding: Yes  Healthy Habits:     Getting at least 3 servings of Calcium per day:  Yes    Bi-annual eye exam:  Yes    Dental care twice a year:  NO    Sleep apnea or symptoms of sleep apnea:  Daytime drowsiness    Diet:  Regular (no restrictions)    Frequency of exercise:  2-3 days/week    Duration of exercise:  30-45 minutes    Taking medications regularly:  Yes    Medication side effects:  None    PHQ-2 Total Score: 0    Additional concerns today:  Yes        -derm problem, lumps on skin, back of neck, painful, neck pain and shoulder pain    One lump posterior scalp  Large last week  Similar lump above it  Painful for a week  Overall sx are improving  No drainage    Recurrent cysts - but no drainage    -wants full STD screening, no known exposure    -headaches, randomly happening within  The last week, sides/frontal and back, pressure pain  Gets frequent short headaches  Front or back of head, pressure headache - lasts 1-2 minutes  Short stabbing pain  No specific triggers  Hydration - good  No change in medications, no change in alcohol use      -ear pain and drainage into throat  On and off since January  Deep inside the ear - pain when tugging on the ear  Right > left  Does not use q-tips  No hearing loss    -red spots/rings on leg and one in pubic area        Today's PHQ-2 Score:   PHQ-2 ( 1999 [employer]) 11/18/2020   Q1: Little interest or pleasure in doing things 0   Q2: Feeling down, depressed or hopeless 0   PHQ-2 Score 0   Q1: Little interest or pleasure in doing things Not at all   Q2: Feeling down, depressed or hopeless Not at all   PHQ-2 Score 0       Abuse: Current or Past (Physical, Sexual or Emotional) - No  Do you feel safe in your environment? Yes        Social History     Tobacco Use     Smoking status: Never Smoker      Smokeless tobacco: Never Used   Substance Use Topics     Alcohol use: Yes     Comment: socially     If you drink alcohol do you typically have >3 drinks per day or >7 drinks per week? No    No flowsheet data found.    Reviewed orders with patient.  Reviewed health maintenance and updated orders accordingly - Yes  Patient Active Problem List   Diagnosis     Severe episode of recurrent major depressive disorder, without psychotic features (H)     PABLO (generalized anxiety disorder)     Panic attack     Disorganized thinking     OCD (obsessive compulsive disorder)     Past Surgical History:   Procedure Laterality Date     ENT SURGERY      tonsillectomy; ear tubes     HEAD & NECK SURGERY      wisdom teeth extraction       Social History     Tobacco Use     Smoking status: Never Smoker     Smokeless tobacco: Never Used   Substance Use Topics     Alcohol use: Yes     Comment: socially     Family History   Problem Relation Age of Onset     Depression Mother      Anxiety Disorder Mother      Mental Illness Mother         OCD     Attention Deficit Disorder Mother      Hypertension Mother      Substance Abuse Mother      Mental Illness Father      Anxiety Disorder Father      Depression Father      Attention Deficit Disorder Father      Substance Abuse Father      Alcoholism Maternal Grandmother      Substance Abuse Maternal Grandmother      Alcoholism Maternal Grandfather      Substance Abuse Maternal Grandfather      Substance Abuse Maternal Uncle      Schizophrenia Maternal Uncle      Substance Abuse Brother      Diabetes No family hx of      Coronary Artery Disease No family hx of      Hypertension No family hx of      Hyperlipidemia No family hx of      Cerebrovascular Disease No family hx of      Breast Cancer No family hx of      Colon Cancer No family hx of      Prostate Cancer No family hx of      Other Cancer No family hx of      Anesthesia Reaction No family hx of      Asthma No family hx of      Osteoporosis No  "family hx of      Genetic Disorder No family hx of      Thyroid Disease No family hx of      Obesity No family hx of      Unknown/Adopted No family hx of          Current Outpatient Medications   Medication Sig Dispense Refill     clotrimazole (LOTRIMIN) 1 % external cream Apply topically 2 times daily Use for at least 2 weeks, likely will need 3 weeks to treat. 30 g 1     IBUPROFEN PO Take 400 mg by mouth        levonorgestrel (MIRENA, 52 MG,) 20 MCG/24HR IUD 1 each by Intrauterine route once       lisdexamfetamine (VYVANSE) 30 MG capsule Take 1 capsule (30 mg) by mouth daily 30 capsule 0     Allergies   Allergen Reactions     Latex Rash       Mammogram not appropriate for this patient based on age.    Pertinent mammograms are reviewed under the imaging tab.  History of abnormal Pap smear: NO - age 21-29 PAP every 3 years recommended  PAP / HPV 10/19/2017   PAP NIL     Reviewed and updated as needed this visit by clinical staff  Tobacco  Allergies  Meds              Reviewed and updated as needed this visit by Provider                    Review of Systems   Constitutional: Negative.    HENT: Positive for ear pain and postnasal drip.    Eyes: Negative.    Respiratory: Negative.    Cardiovascular: Negative.    Gastrointestinal: Negative.    Genitourinary: Negative.    Musculoskeletal: Negative.    Skin:        As in HPI   Neurological: Positive for headaches.   Psychiatric/Behavioral: Negative.           OBJECTIVE:   /78   Pulse 93   Temp 99.6  F (37.6  C) (Tympanic)   Resp 14   Ht 1.613 m (5' 3.5\")   Wt 66.2 kg (146 lb)   LMP 10/29/2020 (Approximate)   SpO2 99%   BMI 25.46 kg/m    Physical Exam  Constitutional:       General: She is not in acute distress.     Appearance: She is well-developed.   HENT:      Right Ear: Ear canal and external ear normal. A middle ear effusion is present. Tympanic membrane is not injected, perforated or erythematous.      Left Ear: Tympanic membrane, ear canal and " external ear normal.      Nose: Nose normal.      Mouth/Throat:      Pharynx: No oropharyngeal exudate.   Eyes:      General:         Right eye: No discharge.         Left eye: No discharge.      Conjunctiva/sclera: Conjunctivae normal.      Pupils: Pupils are equal, round, and reactive to light.   Neck:      Musculoskeletal: Neck supple.      Thyroid: No thyromegaly.      Trachea: No tracheal deviation.   Cardiovascular:      Rate and Rhythm: Normal rate and regular rhythm.      Pulses: Normal pulses.      Heart sounds: Normal heart sounds, S1 normal and S2 normal. No murmur. No friction rub. No S3 or S4 sounds.    Pulmonary:      Effort: Pulmonary effort is normal. No respiratory distress.      Breath sounds: Normal breath sounds. No wheezing or rales.   Chest:      Breasts:         Right: No mass, nipple discharge or tenderness.         Left: No mass, nipple discharge or tenderness.   Abdominal:      Palpations: Abdomen is soft. There is no mass.      Tenderness: There is no abdominal tenderness.   Genitourinary:     Vagina: Normal.      Cervix: No cervical motion tenderness or discharge.      Comments: IUD strings visible at os  Musculoskeletal: Normal range of motion.   Lymphadenopathy:      Cervical: No cervical adenopathy.   Skin:     General: Skin is warm and dry.      Findings: No rash.      Comments: 1. Right lower leg - 6 mm round lesion with hyperpigmented, flaky borders  2. Left mons pubis - 12 mm round lesion with hyperpigmented, flaky borders   Neurological:      Mental Status: She is alert and oriented to person, place, and time.      Motor: No abnormal muscle tone.   Psychiatric:         Thought Content: Thought content normal.         Judgment: Judgment normal.             Diagnostic Test Results:  No results found for this or any previous visit (from the past 24 hour(s)).    ASSESSMENT/PLAN:       ICD-10-CM    1. Encounter for routine adult health examination without abnormal findings  Z00.00  "Glucose     Lipid panel reflex to direct LDL Fasting   2. Screening for cervical cancer  Z12.4 Pap imaged thin layer screen only - recommended age 21 - 24 years   3. Routine screening for STI (sexually transmitted infection)  Z11.3 Chlamydia trachomatis PCR     Neisseria gonorrhoeae PCR     Treponema Abs w Reflex to RPR and Titer     HIV Antigen Antibody Combo     Hepatitis C antibody   4. Tinea corporis  B35.4 clotrimazole (LOTRIMIN) 1 % external cream   5. Dysfunction of right eustachian tube  H69.81    6. Episodic tension-type headache, not intractable  G44.219       MDM  Number of Diagnoses or Management Options  Dysfunction of right eustachian tube: new, no workup  Episodic tension-type headache, not intractable: new, no workup  Tinea corporis: new, no workup  Risk of Complications, Morbidity, and/or Mortality  Presenting problems: moderate  Management options: low  General comments:     - Eustachian tube plan - Flonase, popping ears, recheck as needed.   - Headache - no red flags, intermittent - no need for pain medication.  Monitor symptoms and recheck if not improved or if worsening.   - Tinea lesions - discussed with patient.  Treat with antifungal for at least 2 weeks.          Patient has been advised of split billing requirements and indicates understanding: Yes  COUNSELING:  Reviewed preventive health counseling, as reflected in patient instructions    Estimated body mass index is 25.46 kg/m  as calculated from the following:    Height as of this encounter: 1.613 m (5' 3.5\").    Weight as of this encounter: 66.2 kg (146 lb).        She reports that she has never smoked. She has never used smokeless tobacco.      Counseling Resources:  ATP IV Guidelines  Pooled Cohorts Equation Calculator  Breast Cancer Risk Calculator  BRCA-Related Cancer Risk Assessment: FHS-7 Tool  FRAX Risk Assessment  ICSI Preventive Guidelines  Dietary Guidelines for Americans, 2010  USDA's MyPlate  ASA Prophylaxis  Lung CA " Screening    Lin Brown PA-C  [facility]  Answers for HPI/ROS submitted by the patient on 11/19/2020   Annual Exam:  PHQ9 TOTAL SCORE: 3

## 2020-11-19 ENCOUNTER — OFFICE VISIT (OUTPATIENT)
Dept: FAMILY MEDICINE | Facility: CLINIC | Age: 25
End: 2020-11-19
Payer: COMMERCIAL

## 2020-11-19 VITALS
HEART RATE: 93 BPM | WEIGHT: 146 LBS | TEMPERATURE: 99.6 F | RESPIRATION RATE: 14 BRPM | DIASTOLIC BLOOD PRESSURE: 78 MMHG | SYSTOLIC BLOOD PRESSURE: 118 MMHG | BODY MASS INDEX: 24.92 KG/M2 | OXYGEN SATURATION: 99 % | HEIGHT: 64 IN

## 2020-11-19 DIAGNOSIS — H69.91 DYSFUNCTION OF RIGHT EUSTACHIAN TUBE: ICD-10-CM

## 2020-11-19 DIAGNOSIS — Z11.3 ROUTINE SCREENING FOR STI (SEXUALLY TRANSMITTED INFECTION): ICD-10-CM

## 2020-11-19 DIAGNOSIS — Z00.00 ENCOUNTER FOR ROUTINE ADULT HEALTH EXAMINATION WITHOUT ABNORMAL FINDINGS: Primary | ICD-10-CM

## 2020-11-19 DIAGNOSIS — B35.4 TINEA CORPORIS: ICD-10-CM

## 2020-11-19 DIAGNOSIS — G44.219 EPISODIC TENSION-TYPE HEADACHE, NOT INTRACTABLE: ICD-10-CM

## 2020-11-19 DIAGNOSIS — Z12.4 SCREENING FOR CERVICAL CANCER: ICD-10-CM

## 2020-11-19 PROCEDURE — 86803 HEPATITIS C AB TEST: CPT | Performed by: PHYSICIAN ASSISTANT

## 2020-11-19 PROCEDURE — 36415 COLL VENOUS BLD VENIPUNCTURE: CPT | Performed by: PHYSICIAN ASSISTANT

## 2020-11-19 PROCEDURE — 86780 TREPONEMA PALLIDUM: CPT | Mod: 90 | Performed by: PHYSICIAN ASSISTANT

## 2020-11-19 PROCEDURE — 82947 ASSAY GLUCOSE BLOOD QUANT: CPT | Performed by: PHYSICIAN ASSISTANT

## 2020-11-19 PROCEDURE — 87389 HIV-1 AG W/HIV-1&-2 AB AG IA: CPT | Performed by: PHYSICIAN ASSISTANT

## 2020-11-19 PROCEDURE — 87491 CHLMYD TRACH DNA AMP PROBE: CPT | Performed by: PHYSICIAN ASSISTANT

## 2020-11-19 PROCEDURE — G0145 SCR C/V CYTO,THINLAYER,RESCR: HCPCS | Performed by: PHYSICIAN ASSISTANT

## 2020-11-19 PROCEDURE — 80061 LIPID PANEL: CPT | Performed by: PHYSICIAN ASSISTANT

## 2020-11-19 PROCEDURE — 99000 SPECIMEN HANDLING OFFICE-LAB: CPT | Performed by: PHYSICIAN ASSISTANT

## 2020-11-19 PROCEDURE — 87591 N.GONORRHOEAE DNA AMP PROB: CPT | Performed by: PHYSICIAN ASSISTANT

## 2020-11-19 PROCEDURE — 99395 PREV VISIT EST AGE 18-39: CPT | Performed by: PHYSICIAN ASSISTANT

## 2020-11-19 PROCEDURE — 99214 OFFICE O/P EST MOD 30 MIN: CPT | Mod: 25 | Performed by: PHYSICIAN ASSISTANT

## 2020-11-19 RX ORDER — CLOTRIMAZOLE 1 %
CREAM (GRAM) TOPICAL 2 TIMES DAILY
Qty: 30 G | Refills: 1 | Status: SHIPPED | OUTPATIENT
Start: 2020-11-19 | End: 2022-02-21

## 2020-11-19 ASSESSMENT — ENCOUNTER SYMPTOMS
MUSCULOSKELETAL NEGATIVE: 1
EYES NEGATIVE: 1
CONSTITUTIONAL NEGATIVE: 1
CARDIOVASCULAR NEGATIVE: 1
PSYCHIATRIC NEGATIVE: 1
ROS SKIN COMMENTS: AS IN HPI
GASTROINTESTINAL NEGATIVE: 1
HEADACHES: 1
RESPIRATORY NEGATIVE: 1

## 2020-11-19 ASSESSMENT — PATIENT HEALTH QUESTIONNAIRE - PHQ9
SUM OF ALL RESPONSES TO PHQ QUESTIONS 1-9: 3
SUM OF ALL RESPONSES TO PHQ QUESTIONS 1-9: 3

## 2020-11-19 ASSESSMENT — MIFFLIN-ST. JEOR: SCORE: 1389.31

## 2020-11-19 NOTE — PATIENT INSTRUCTIONS
The [facility] location is closing and I will be moving my practice to the [facility] on December 7, 2020.    The [facility] is located 8 miles west of the [facility].    New clinic address for Dionisio Brown PA-C:  [address] [place] [address]  Phone: [phone] or [phone]    If you are not able to come to the [facility] moving forward, please note there are several other [facility] Clinics near the [facility].   - [facility] at [address] [place] [id number]  - [facility] at [address] [place] [address]    I hope to continue to serve as your primary care provider in the future.  You can reach me through [employer] or by calling [phone].

## 2020-11-20 LAB
CHOLEST SERPL-MCNC: 179 MG/DL
GLUCOSE SERPL-MCNC: 90 MG/DL (ref 70–99)
HCV AB SERPL QL IA: NONREACTIVE
HDLC SERPL-MCNC: 58 MG/DL
HIV 1+2 AB+HIV1 P24 AG SERPL QL IA: NONREACTIVE
LDLC SERPL CALC-MCNC: 112 MG/DL
NONHDLC SERPL-MCNC: 121 MG/DL
TRIGL SERPL-MCNC: 45 MG/DL

## 2020-11-20 ASSESSMENT — PATIENT HEALTH QUESTIONNAIRE - PHQ9: SUM OF ALL RESPONSES TO PHQ QUESTIONS 1-9: 3

## 2020-11-21 LAB
C TRACH DNA SPEC QL NAA+PROBE: NEGATIVE
N GONORRHOEA DNA SPEC QL NAA+PROBE: NEGATIVE
SPECIMEN SOURCE: NORMAL
SPECIMEN SOURCE: NORMAL
T PALLIDUM AB SER QL: NONREACTIVE

## 2020-11-23 NOTE — RESULT ENCOUNTER NOTE
Shona    Your lab tests are complete and I have reviewed the results.     - Your lab results look great; everything is normal.    If you have any questions or concerns, please feel free to call or send a [employer] message.    Sincerely,  Dionisio Brown PA-C

## 2020-11-25 LAB
COPATH REPORT: NORMAL
PAP: NORMAL

## 2021-02-08 ENCOUNTER — MYC REFILL (OUTPATIENT)
Dept: FAMILY MEDICINE | Facility: CLINIC | Age: 26
End: 2021-02-08

## 2021-02-08 DIAGNOSIS — F90.2 ATTENTION DEFICIT HYPERACTIVITY DISORDER (ADHD), COMBINED TYPE: ICD-10-CM

## 2021-02-08 RX ORDER — LISDEXAMFETAMINE DIMESYLATE 30 MG/1
30 CAPSULE ORAL DAILY
Qty: 30 CAPSULE | Refills: 0 | Status: CANCELLED | OUTPATIENT
Start: 2021-02-08

## 2021-02-09 ENCOUNTER — MYC MEDICAL ADVICE (OUTPATIENT)
Dept: FAMILY MEDICINE | Facility: CLINIC | Age: 26
End: 2021-02-09

## 2021-02-09 RX ORDER — LISDEXAMFETAMINE DIMESYLATE 30 MG/1
30 CAPSULE ORAL DAILY
Qty: 30 CAPSULE | Refills: 0 | Status: SHIPPED | OUTPATIENT
Start: 2021-03-12 | End: 2021-04-11

## 2021-02-09 RX ORDER — LISDEXAMFETAMINE DIMESYLATE 30 MG/1
30 CAPSULE ORAL DAILY
Qty: 30 CAPSULE | Refills: 0 | Status: SHIPPED | OUTPATIENT
Start: 2021-02-09 | End: 2021-03-11

## 2021-02-09 RX ORDER — LISDEXAMFETAMINE DIMESYLATE 30 MG/1
30 CAPSULE ORAL DAILY
Qty: 30 CAPSULE | Refills: 0 | Status: SHIPPED | OUTPATIENT
Start: 2021-04-12 | End: 2021-05-19

## 2021-02-09 NOTE — TELEPHONE ENCOUNTER
Routing refill request to provider for review/approval because:  Drug not on the [facility] refill protocol       Controlled Substance Refill Request for Vyvanse  Problem List Complete:  Yes  Disorganized thinking   10/24/2017      Overview    [place] reviewed:  reviewed June 4, 2020- no concerns  [place]:  [email or web address]       checked in past 3 months?  Yes June 4, 2020- no concerns     Routing refill request to provider for review/approval because:  Drug not on the [facility] refill protocol

## 2021-03-07 NOTE — PLAN OF CARE
"Problem: Depressive Symptoms  Goal: Depressive Symptoms  Signs and symptoms of listed problems will be absent or manageable.   Outcome: Adequate for Discharge Date Met:  10/24/17  Patient agrees she feels better, \"I calmed down and I know things are real\". Depression and anxiety are at a 5 with 10 the worst. Thoughts are more clear and is able to focus and concentrate better. \"The doctor said it was the Sudafed I was taking\". Read over discharge instructions with patient and answered questions. Was discharged with  at 1500.       "
Problem: Depressive Symptoms  Goal: Depressive Symptoms  Signs and symptoms of listed problems will be absent or manageable.   Outcome: No Change  Shona Aguilar is a 22 year old  female with a history of ADHD, depression, anxiety and OCD who presented to the [facility] ED for psychiatric evaluation. She reported that she was started on Lexapro by her primary care physician a few days ago but she experienced a panic attack as well as feeling apathetic earlier this evening. Her  thinks this might be due to potential drug-drug interaction with the sudafed that patient is prescribed. According to the patient, as she was driving home from a friend's residence, she suddenly lost touch of reality and was overcome with paranoia as she thought someone was trying to kill her. She remained disoriented and confused for an extended period with bizarre behaviors until her  brought her to the ED. She listed work and lack of enough funds as her primary stressors, rated her depression at a 5 and anxiety at 7 where 10 is the worst case of clinical presentation. She denies any thoughts or intent to harm self or others, denies hallucinations, denies any significant medical concerns.      She was cooperative with the admission process and went to bed soon thereafter, without any problems. Will continue to monitor and to provide for her safety and comfort per policy.      
Speaking Coherently

## 2021-05-19 ENCOUNTER — MYC REFILL (OUTPATIENT)
Dept: FAMILY MEDICINE | Facility: CLINIC | Age: 26
End: 2021-05-19

## 2021-05-19 DIAGNOSIS — F90.2 ATTENTION DEFICIT HYPERACTIVITY DISORDER (ADHD), COMBINED TYPE: ICD-10-CM

## 2021-05-19 NOTE — TELEPHONE ENCOUNTER
lisdexamfetamine 30 mg      Last Written Prescription Date:  1/6/21  Last Fill Quantity: 30,   # refills: 0  Last Office Visit: 11/19/20 [facility] Office visit:       Routing refill request to provider for review/approval because:  Drug not on the [facility], UMP or  Health refill protocol or controlled substance    RX [place] ([place]) reviewed: nurse not able to access  for this provider    [place]:  [email or web address]    Neeru PETTY RN  EP Triage

## 2021-05-20 RX ORDER — LISDEXAMFETAMINE DIMESYLATE 30 MG/1
30 CAPSULE ORAL DAILY
Qty: 30 CAPSULE | Refills: 0 | Status: SHIPPED | OUTPATIENT
Start: 2021-05-20 | End: 2021-06-01

## 2021-05-20 NOTE — TELEPHONE ENCOUNTER
Ok for 30 day refill.   Patient needs to schedule ADHD recheck (6 months since last visit).     Dionisio Brown PA-C

## 2021-06-01 ENCOUNTER — VIRTUAL VISIT (OUTPATIENT)
Dept: FAMILY MEDICINE | Facility: CLINIC | Age: 26
End: 2021-06-01
Payer: COMMERCIAL

## 2021-06-01 DIAGNOSIS — F90.2 ATTENTION DEFICIT HYPERACTIVITY DISORDER (ADHD), COMBINED TYPE: Primary | ICD-10-CM

## 2021-06-01 PROCEDURE — 99213 OFFICE O/P EST LOW 20 MIN: CPT | Mod: GT | Performed by: PHYSICIAN ASSISTANT

## 2021-06-01 RX ORDER — LISDEXAMFETAMINE DIMESYLATE 30 MG/1
30 CAPSULE ORAL DAILY
Qty: 30 CAPSULE | Refills: 0 | Status: SHIPPED | OUTPATIENT
Start: 2021-08-02 | End: 2021-09-01

## 2021-06-01 RX ORDER — LISDEXAMFETAMINE DIMESYLATE 30 MG/1
30 CAPSULE ORAL DAILY
Qty: 30 CAPSULE | Refills: 0 | Status: SHIPPED | OUTPATIENT
Start: 2021-07-02 | End: 2021-08-01

## 2021-06-01 RX ORDER — LISDEXAMFETAMINE DIMESYLATE 30 MG/1
30 CAPSULE ORAL DAILY
Qty: 30 CAPSULE | Refills: 0 | Status: SHIPPED | OUTPATIENT
Start: 2021-06-01 | End: 2021-07-01

## 2021-06-01 ASSESSMENT — ANXIETY QUESTIONNAIRES
7. FEELING AFRAID AS IF SOMETHING AWFUL MIGHT HAPPEN: NOT AT ALL
6. BECOMING EASILY ANNOYED OR IRRITABLE: SEVERAL DAYS
GAD7 TOTAL SCORE: 4
2. NOT BEING ABLE TO STOP OR CONTROL WORRYING: NOT AT ALL
5. BEING SO RESTLESS THAT IT IS HARD TO SIT STILL: SEVERAL DAYS
1. FEELING NERVOUS, ANXIOUS, OR ON EDGE: SEVERAL DAYS
3. WORRYING TOO MUCH ABOUT DIFFERENT THINGS: NOT AT ALL
IF YOU CHECKED OFF ANY PROBLEMS ON THIS QUESTIONNAIRE, HOW DIFFICULT HAVE THESE PROBLEMS MADE IT FOR YOU TO DO YOUR WORK, TAKE CARE OF THINGS AT HOME, OR GET ALONG WITH OTHER PEOPLE: NOT DIFFICULT AT ALL

## 2021-06-01 ASSESSMENT — PATIENT HEALTH QUESTIONNAIRE - PHQ9
SUM OF ALL RESPONSES TO PHQ QUESTIONS 1-9: 3
5. POOR APPETITE OR OVEREATING: SEVERAL DAYS

## 2021-06-01 NOTE — PROGRESS NOTES
Shona is a 26 year old who is being evaluated via a billable video visit.      How would you like to obtain your AVS? MyChart  If the video visit is dropped, the invitation should be resent by: Text to cell phone: [phone]  Will anyone else be joining your video visit? No      Video Start Time: 2:29 PM    Assessment & Plan   Problem List Items Addressed This Visit     None      Visit Diagnoses     Attention deficit hyperactivity disorder (ADHD), combined type    -  Primary    Relevant Medications    lisdexamfetamine (VYVANSE) 30 MG capsule    lisdexamfetamine (VYVANSE) 30 MG capsule (Start on 7/2/2021)    lisdexamfetamine (VYVANSE) 30 MG capsule (Start on 8/2/2021)                         Return in about 6 months (around 12/1/2021) for Preventive Care Visit.    Lin Brown PA-C  [facility] [place]    Emelyn Nagel is a 26 year old who presents for the following health issues     HPI     Medication Followup of Vyvanse    Taking Medication as prescribed: yes    Side Effects:  None    Medication Helping Symptoms:  Yes    Also would like to discuss her IUD     Got IUD for menstrual cramps and hormonal symptoms.   Now getting weepy again - worse around her period.      PABLO-7 SCORE 2/3/2020 10/2/2020 6/1/2021   Total Score 6 (mild anxiety) - -   Total Score 6 0 4               Review of Systems         Objective           Vitals:  No vitals were obtained today due to virtual visit.    Physical Exam   GENERAL: Healthy, alert and no distress  EYES: Eyes grossly normal to inspection.  No discharge or erythema, or obvious scleral/conjunctival abnormalities.  RESP: No audible wheeze, cough, or visible cyanosis.  No visible retractions or increased work of breathing.    SKIN: Visible skin clear. No significant rash, abnormal pigmentation or lesions.  NEURO: Cranial nerves grossly intact.  Mentation and speech appropriate for age.  PSYCH: Mentation appears normal, affect normal/bright,  judgement and insight intact, normal speech and appearance well-groomed.                Video-Visit Details    Type of service:  Video Visit    Video End Time:2:35 PM    Originating Location (pt. Location): Home    Distant Location (provider location):  [facility]     Platform used for Video Visit: [email or web address]

## 2021-06-02 ASSESSMENT — ANXIETY QUESTIONNAIRES: GAD7 TOTAL SCORE: 4

## 2021-06-25 ENCOUNTER — NURSE TRIAGE (OUTPATIENT)
Dept: NURSING | Facility: CLINIC | Age: 26
End: 2021-06-25

## 2021-06-25 NOTE — TELEPHONE ENCOUNTER
"Patient calling reporting she finished her period 5 days ago and she started having spotting of \"watery blood\" today. Does not need to wear pad. Patient reports she had IUD placed 4 years ago. Reports she checked the string and the string appeared to longer than usual. Denies abdominal pain. Per guideline, advised to be seen within 2-3 days.     Chuck Hoff RN  [facility]     COVID 19 Nurse Triage Plan/Patient Instructions    Please be aware that novel coronavirus (COVID-19) may be circulating in the community. If you develop symptoms such as fever, cough, or SOB or if you have concerns about the presence of another infection including coronavirus (COVID-19), please contact your health care provider or visit [email or web address].     Disposition/Instructions    In-Person Visit with provider recommended. Reference Visit Selection Guide.    Thank you for taking steps to prevent the spread of this virus.  o Limit your contact with others.  o Wear a simple mask to cover your cough.  o Wash your hands well and often.    Resources    [facility]: About COVID-19: [email or web address]    CDC: What to Do If You're Sick: www.cdc.gov/coronavirus/2019-ncov/about/steps-when-sick.html    CDC: Ending Home Isolation: www.cdc.gov/coronavirus/2019-ncov/hcp/disposition-in-home-patients.html     CDC: Caring for Someone: www.cdc.gov/coronavirus/2019-ncov/if-you-are-sick/care-for-someone.html     [facility]: Interim Guidance for Hospital Discharge to Home: www.[email or web address]/diseases/coronavirus/hcp/hospdischarge.pdf    [facility] clinical trials (COVID-19 research studies): [email or web address]     Below are the COVID-19 hotlines at the [place] Department of Health ([facility]). Interpreters are available.   o For health questions: Call 355-865-2804 or 1-952.506.5025 (7 a.m. to 7 p.m.)  o For questions about schools and childcare: Call 808-637-8857 or " 3-997-905-8468 (7 a.m. to 7 p.m.)                       Reason for Disposition    Worried that IUD is not in right place (e.g., not able to feel the IUD string, string longer than usual, can feel hard plastic of IUD)    Additional Information    Negative: Shock suspected (e.g., cold/pale/clammy skin, too weak to stand, low BP, rapid pulse)    Negative: Sounds like a life-threatening emergency to the triager    Negative: [1] SEVERE abdominal pain (e.g., excruciating) AND [2] present > 1 hour    Negative: [1] SEVERE vaginal bleeding (e.g., soaking 2 pads or tampons per hour) AND [2] present 2 or more hours    Negative: Patient sounds very sick or weak to the triager    Negative: MODERATE vaginal bleeding (i.e., soaking 1 pad or tampon per hour and present > 6 hours)    Negative: [1] Constant abdominal pain AND [2] present > 2 hours    Negative: [1] Caller has URGENT question AND [2] triager unable to answer question    Negative: [1] MILD abdominal pain (e.g., does not interfere with normal activities) AND [2] pain comes and goes (cramps) AND [3] present > 48 hours    Negative: [1] Caller has NON-URGENT question AND [2] triager unable to answer question    Negative: Pregnant    Negative: [1] Periods with > 6 soaked pads or tampons per day AND [2] last > 7 days    Protocols used: CONTRACEPTION - IUD SYMPTOMS AND [id number]

## 2021-10-03 ENCOUNTER — HEALTH MAINTENANCE LETTER (OUTPATIENT)
Age: 26
End: 2021-10-03

## 2021-12-21 ENCOUNTER — OFFICE VISIT (OUTPATIENT)
Dept: OBGYN | Facility: CLINIC | Age: 26
End: 2021-12-21
Payer: COMMERCIAL

## 2021-12-21 VITALS
HEART RATE: 85 BPM | OXYGEN SATURATION: 100 % | WEIGHT: 146 LBS | BODY MASS INDEX: 25.46 KG/M2 | DIASTOLIC BLOOD PRESSURE: 81 MMHG | SYSTOLIC BLOOD PRESSURE: 126 MMHG

## 2021-12-21 DIAGNOSIS — Z11.3 SCREEN FOR STD (SEXUALLY TRANSMITTED DISEASE): ICD-10-CM

## 2021-12-21 DIAGNOSIS — N92.1 BREAKTHROUGH BLEEDING ASSOCIATED WITH INTRAUTERINE DEVICE (IUD): Primary | ICD-10-CM

## 2021-12-21 DIAGNOSIS — N89.8 VAGINAL DISCHARGE: ICD-10-CM

## 2021-12-21 DIAGNOSIS — Z97.5 BREAKTHROUGH BLEEDING ASSOCIATED WITH INTRAUTERINE DEVICE (IUD): Primary | ICD-10-CM

## 2021-12-21 LAB
CLUE CELLS: ABNORMAL
TRICHOMONAS, WET PREP: ABNORMAL
WBC'S/HIGH POWER FIELD, WET PREP: ABNORMAL
YEAST, WET PREP: ABNORMAL

## 2021-12-21 PROCEDURE — 87491 CHLMYD TRACH DNA AMP PROBE: CPT | Performed by: OBSTETRICS & GYNECOLOGY

## 2021-12-21 PROCEDURE — 87210 SMEAR WET MOUNT SALINE/INK: CPT | Performed by: OBSTETRICS & GYNECOLOGY

## 2021-12-21 PROCEDURE — 99204 OFFICE O/P NEW MOD 45 MIN: CPT | Performed by: OBSTETRICS & GYNECOLOGY

## 2021-12-21 RX ORDER — ESTRADIOL 1 MG/1
1 TABLET ORAL DAILY
Qty: 30 TABLET | Refills: 1 | Status: SHIPPED | OUTPATIENT
Start: 2021-12-21 | End: 2022-02-21

## 2021-12-21 RX ORDER — LORATADINE 10 MG/1
10 TABLET ORAL DAILY
COMMUNITY
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
## 2021-12-22 LAB — C TRACH DNA SPEC QL NAA+PROBE: NEGATIVE

## 2021-12-28 ENCOUNTER — MYC REFILL (OUTPATIENT)
Dept: FAMILY MEDICINE | Facility: CLINIC | Age: 26
End: 2021-12-28
Payer: COMMERCIAL

## 2021-12-28 DIAGNOSIS — F90.2 ATTENTION DEFICIT HYPERACTIVITY DISORDER (ADHD), COMBINED TYPE: ICD-10-CM

## 2021-12-29 RX ORDER — LISDEXAMFETAMINE DIMESYLATE 30 MG/1
30 CAPSULE ORAL DAILY
Qty: 30 CAPSULE | Refills: 0 | Status: SHIPPED | OUTPATIENT
Start: 2021-12-29 | End: 2022-01-31

## 2021-12-29 NOTE — TELEPHONE ENCOUNTER
Failed protocol.  please route to  team if patient needs an appointment     Helen OLIVARESRN BSN  [facility]  [phone]

## 2022-01-23 ENCOUNTER — HEALTH MAINTENANCE LETTER (OUTPATIENT)
Age: 27
End: 2022-01-23

## 2022-02-20 ASSESSMENT — ENCOUNTER SYMPTOMS
MYALGIAS: 0
DIARRHEA: 0
FREQUENCY: 0
ARTHRALGIAS: 0
PARESTHESIAS: 0
CHILLS: 0
DYSURIA: 0
NAUSEA: 0
BREAST MASS: 0
EYE PAIN: 0
HEADACHES: 0
NERVOUS/ANXIOUS: 0
DIZZINESS: 0
HEMATURIA: 0
JOINT SWELLING: 0
CONSTIPATION: 0
SHORTNESS OF BREATH: 0
FEVER: 0
HEMATOCHEZIA: 0
HEARTBURN: 0
PALPITATIONS: 0
SORE THROAT: 0
WEAKNESS: 0
COUGH: 1
ABDOMINAL PAIN: 0

## 2022-02-21 ENCOUNTER — OFFICE VISIT (OUTPATIENT)
Dept: FAMILY MEDICINE | Facility: CLINIC | Age: 27
End: 2022-02-21
Payer: COMMERCIAL

## 2022-02-21 VITALS
WEIGHT: 139 LBS | OXYGEN SATURATION: 98 % | TEMPERATURE: 97.7 F | RESPIRATION RATE: 14 BRPM | HEIGHT: 63 IN | BODY MASS INDEX: 24.63 KG/M2 | SYSTOLIC BLOOD PRESSURE: 100 MMHG | HEART RATE: 83 BPM | DIASTOLIC BLOOD PRESSURE: 75 MMHG

## 2022-02-21 DIAGNOSIS — F90.2 ATTENTION DEFICIT HYPERACTIVITY DISORDER (ADHD), COMBINED TYPE: ICD-10-CM

## 2022-02-21 DIAGNOSIS — Z00.00 ENCOUNTER FOR ROUTINE ADULT HEALTH EXAMINATION WITHOUT ABNORMAL FINDINGS: Primary | ICD-10-CM

## 2022-02-21 PROCEDURE — 99213 OFFICE O/P EST LOW 20 MIN: CPT | Mod: 25 | Performed by: PHYSICIAN ASSISTANT

## 2022-02-21 PROCEDURE — 99395 PREV VISIT EST AGE 18-39: CPT | Performed by: PHYSICIAN ASSISTANT

## 2022-02-21 RX ORDER — LISDEXAMFETAMINE DIMESYLATE 40 MG/1
40 CAPSULE ORAL DAILY
Qty: 30 CAPSULE | Refills: 0 | Status: SHIPPED | OUTPATIENT
Start: 2022-03-24 | End: 2022-04-23

## 2022-02-21 RX ORDER — LISDEXAMFETAMINE DIMESYLATE 30 MG/1
30 CAPSULE ORAL DAILY
Qty: 30 CAPSULE | Refills: 0 | Status: CANCELLED | OUTPATIENT
Start: 2022-03-24 | End: 2022-04-23

## 2022-02-21 RX ORDER — LISDEXAMFETAMINE DIMESYLATE 30 MG/1
30 CAPSULE ORAL DAILY
Qty: 30 CAPSULE | Refills: 0 | Status: CANCELLED | OUTPATIENT
Start: 2022-02-21 | End: 2022-03-23

## 2022-02-21 RX ORDER — LISDEXAMFETAMINE DIMESYLATE 40 MG/1
40 CAPSULE ORAL DAILY
Qty: 30 CAPSULE | Refills: 0 | Status: SHIPPED | OUTPATIENT
Start: 2022-02-21 | End: 2022-03-23

## 2022-02-21 RX ORDER — LISDEXAMFETAMINE DIMESYLATE 40 MG/1
40 CAPSULE ORAL DAILY
Qty: 30 CAPSULE | Refills: 0 | Status: SHIPPED | OUTPATIENT
Start: 2022-04-24 | End: 2022-04-12

## 2022-02-21 RX ORDER — LISDEXAMFETAMINE DIMESYLATE 30 MG/1
30 CAPSULE ORAL DAILY
Qty: 30 CAPSULE | Refills: 0 | Status: CANCELLED | OUTPATIENT
Start: 2022-04-24 | End: 2022-05-24

## 2022-02-21 ASSESSMENT — ANXIETY QUESTIONNAIRES
GAD7 TOTAL SCORE: 3
5. BEING SO RESTLESS THAT IT IS HARD TO SIT STILL: SEVERAL DAYS
IF YOU CHECKED OFF ANY PROBLEMS ON THIS QUESTIONNAIRE, HOW DIFFICULT HAVE THESE PROBLEMS MADE IT FOR YOU TO DO YOUR WORK, TAKE CARE OF THINGS AT HOME, OR GET ALONG WITH OTHER PEOPLE: NOT DIFFICULT AT ALL
3. WORRYING TOO MUCH ABOUT DIFFERENT THINGS: NOT AT ALL
2. NOT BEING ABLE TO STOP OR CONTROL WORRYING: NOT AT ALL
6. BECOMING EASILY ANNOYED OR IRRITABLE: NOT AT ALL
1. FEELING NERVOUS, ANXIOUS, OR ON EDGE: SEVERAL DAYS
7. FEELING AFRAID AS IF SOMETHING AWFUL MIGHT HAPPEN: NOT AT ALL

## 2022-02-21 ASSESSMENT — ENCOUNTER SYMPTOMS
WEAKNESS: 0
ABDOMINAL PAIN: 0
ARTHRALGIAS: 0
DIARRHEA: 0
FEVER: 0
FREQUENCY: 0
EYE PAIN: 0
HEMATOCHEZIA: 0
DYSURIA: 0
HEMATURIA: 0
DIZZINESS: 0
CONSTIPATION: 0
JOINT SWELLING: 0
HEADACHES: 0
SORE THROAT: 0
NAUSEA: 0
COUGH: 1
HEARTBURN: 0
SHORTNESS OF BREATH: 0
MYALGIAS: 0
BREAST MASS: 0
CHILLS: 0
PALPITATIONS: 0
PARESTHESIAS: 0
NERVOUS/ANXIOUS: 0

## 2022-02-21 ASSESSMENT — PATIENT HEALTH QUESTIONNAIRE - PHQ9
SUM OF ALL RESPONSES TO PHQ QUESTIONS 1-9: 2
5. POOR APPETITE OR OVEREATING: SEVERAL DAYS

## 2022-02-21 ASSESSMENT — PAIN SCALES - GENERAL: PAINLEVEL: NO PAIN (0)

## 2022-02-21 NOTE — PROGRESS NOTES
SUBJECTIVE:   CC: Shona Aguilar is an 27 year old woman who presents for preventive health visit.       Patient has been advised of split billing requirements and indicates understanding: Yes  Healthy Habits:     Getting at least 3 servings of Calcium per day:  Yes    Bi-annual eye exam:  Yes    Dental care twice a year:  NO    Sleep apnea or symptoms of sleep apnea:  None    Diet:  Regular (no restrictions)    Frequency of exercise:  4-5 days/week    Duration of exercise:  15-30 minutes    Taking medications regularly:  Yes    Medication side effects:  None    PHQ-2 Total Score: 1    Additional concerns today:  No          Medication Followup of Vyvanse    Taking Medication as prescribed: yes    Side Effects:  None    Medication Helping Symptoms:  Yes, but would like to try a higher dose.         Today's PHQ-2 Score:   PHQ-2 ( 1999 Pfizer) 2/20/2022   Q1: Little interest or pleasure in doing things 1   Q2: Feeling down, depressed or hopeless 0   PHQ-2 Score 1   PHQ-2 Total Score (12-17 Years)- Positive if 3 or more points; Administer PHQ-A if positive -   Q1: Little interest or pleasure in doing things Several days   Q2: Feeling down, depressed or hopeless Not at all   PHQ-2 Score 1     PHQ 11/19/2020 6/1/2021 2/21/2022   PHQ-9 Total Score 3 3 2   Q9: Thoughts of better off dead/self-harm past 2 weeks Not at all Not at all Not at all   F/U: Thoughts of suicide or self-harm - - -   F/U: Safety concerns - - -     PABLO-7 SCORE 10/2/2020 6/1/2021 2/21/2022   Total Score - - -   Total Score 0 4 3           Abuse: Current or Past (Physical, Sexual or Emotional) - No  Do you feel safe in your environment? Yes    Have you ever done Advance Care Planning? (For example, a Health Directive, POLST, or a discussion with a medical provider or your loved ones about your wishes): No, advance care planning information given to patient to review.  Patient declined advance care planning discussion at this time.    Social History      Tobacco Use     Smoking status: Never Smoker     Smokeless tobacco: Never Used   Substance Use Topics     Alcohol use: Yes     Comment: socially     If you drink alcohol do you typically have >3 drinks per day or >7 drinks per week? No    Alcohol Use 2/21/2022   Prescreen: >3 drinks/day or >7 drinks/week? -   Prescreen: >3 drinks/day or >7 drinks/week? No       Reviewed orders with patient.  Reviewed health maintenance and updated orders accordingly - Yes  Patient Active Problem List   Diagnosis     Severe episode of recurrent major depressive disorder, without psychotic features (H)     PABLO (generalized anxiety disorder)     Panic attack     Disorganized thinking     OCD (obsessive compulsive disorder)     Past Surgical History:   Procedure Laterality Date     ENT SURGERY      tonsillectomy; ear tubes     HEAD & NECK SURGERY      wisdom teeth extraction       Social History     Tobacco Use     Smoking status: Never Smoker     Smokeless tobacco: Never Used   Substance Use Topics     Alcohol use: Yes     Comment: socially     Family History   Problem Relation Age of Onset     Depression Mother      Anxiety Disorder Mother      Mental Illness Mother         OCD     Attention Deficit Disorder Mother      Hypertension Mother      Substance Abuse Mother      Mental Illness Father      Anxiety Disorder Father      Depression Father      Attention Deficit Disorder Father      Substance Abuse Father      Alcoholism Maternal Grandmother      Substance Abuse Maternal Grandmother      Alcoholism Maternal Grandfather      Substance Abuse Maternal Grandfather      Substance Abuse Maternal Uncle      Schizophrenia Maternal Uncle      Substance Abuse Brother      Diabetes No family hx of      Coronary Artery Disease No family hx of      Hypertension No family hx of      Hyperlipidemia No family hx of      Cerebrovascular Disease No family hx of      Breast Cancer No family hx of      Colon Cancer No family hx of      Prostate  Cancer No family hx of      Other Cancer No family hx of      Anesthesia Reaction No family hx of      Asthma No family hx of      Osteoporosis No family hx of      Genetic Disorder No family hx of      Thyroid Disease No family hx of      Obesity No family hx of      Unknown/Adopted No family hx of          Current Outpatient Medications   Medication Sig Dispense Refill     IBUPROFEN PO Take 400 mg by mouth        levonorgestrel (MIRENA, 52 MG,) 20 MCG/24HR IUD 1 each by Intrauterine route once       lisdexamfetamine (VYVANSE) 30 MG capsule Take 1 capsule (30 mg) by mouth daily 30 capsule 0     lisdexamfetamine (VYVANSE) 40 MG capsule Take 1 capsule (40 mg) by mouth daily 30 capsule 0     [START ON 3/24/2022] lisdexamfetamine (VYVANSE) 40 MG capsule Take 1 capsule (40 mg) by mouth daily 30 capsule 0     [START ON 4/24/2022] lisdexamfetamine (VYVANSE) 40 MG capsule Take 1 capsule (40 mg) by mouth daily 30 capsule 0     loratadine (CLARITIN) 10 MG tablet Take 10 mg by mouth daily       Allergies   Allergen Reactions     Latex Rash       Breast Cancer Screening:    Breast CA Risk Assessment (FHS-7) 2/20/2022   Do you have a family history of breast, colon, or ovarian cancer? No / Unknown         Patient under 40 years of age: Routine Mammogram Screening not recommended.   Pertinent mammograms are reviewed under the imaging tab.    History of abnormal Pap smear: NO - age 21-29 PAP every 3 years recommended  PAP / HPV 11/19/2020 10/19/2017   PAP (Historical) NIL NIL     Reviewed and updated as needed this visit by clinical staff   Tobacco  Allergies  Meds              Reviewed and updated as needed this visit by Provider     Meds                 Review of Systems   Constitutional: Negative for chills and fever.   HENT: Negative for congestion, ear pain, hearing loss and sore throat.    Eyes: Negative for pain and visual disturbance.   Respiratory: Positive for cough. Negative for shortness of breath.   "  Cardiovascular: Negative for chest pain, palpitations and peripheral edema.   Gastrointestinal: Negative for abdominal pain, constipation, diarrhea, heartburn, hematochezia and nausea.   Breasts:  Negative for tenderness, breast mass and discharge.   Genitourinary: Negative for dysuria, frequency, genital sores, hematuria, pelvic pain, urgency, vaginal bleeding and vaginal discharge.   Musculoskeletal: Negative for arthralgias, joint swelling and myalgias.   Skin: Negative for rash.   Neurological: Negative for dizziness, weakness, headaches and paresthesias.   Psychiatric/Behavioral: Positive for mood changes. The patient is not nervous/anxious.           OBJECTIVE:   /75   Pulse 83   Temp 97.7  F (36.5  C) (Tympanic)   Resp 14   Ht 1.607 m (5' 3.27\")   Wt 63 kg (139 lb)   LMP 02/20/2022   SpO2 98%   BMI 24.41 kg/m    Physical Exam  Constitutional:       General: She is not in acute distress.     Appearance: She is well-developed. She is not diaphoretic.   HENT:      Head: Normocephalic.      Right Ear: External ear normal.      Left Ear: External ear normal.      Nose: Nose normal.   Eyes:      Conjunctiva/sclera: Conjunctivae normal.   Cardiovascular:      Rate and Rhythm: Normal rate and regular rhythm.      Heart sounds: Normal heart sounds.   Pulmonary:      Effort: Pulmonary effort is normal.      Breath sounds: Normal breath sounds.   Musculoskeletal:      Cervical back: Normal range of motion.   Skin:     General: Skin is warm and dry.   Neurological:      Mental Status: She is alert and oriented to person, place, and time.   Psychiatric:         Judgment: Judgment normal.           Diagnostic Test Results:  Labs reviewed in Epic    ASSESSMENT/PLAN:   Shona was seen today for physical.    Diagnoses and all orders for this visit:    Encounter for routine adult health examination without abnormal findings    Attention deficit hyperactivity disorder (ADHD), combined type  -     " "lisdexamfetamine (VYVANSE) 40 MG capsule; Take 1 capsule (40 mg) by mouth daily  -     lisdexamfetamine (VYVANSE) 40 MG capsule; Take 1 capsule (40 mg) by mouth daily  -     lisdexamfetamine (VYVANSE) 40 MG capsule; Take 1 capsule (40 mg) by mouth daily            COUNSELING:  Reviewed preventive health counseling, as reflected in patient instructions    Estimated body mass index is 24.41 kg/m  as calculated from the following:    Height as of this encounter: 1.607 m (5' 3.27\").    Weight as of this encounter: 63 kg (139 lb).        She reports that she has never smoked. She has never used smokeless tobacco.      Counseling Resources:  ATP IV Guidelines  Pooled Cohorts Equation Calculator  Breast Cancer Risk Calculator  BRCA-Related Cancer Risk Assessment: FHS-7 Tool  FRAX Risk Assessment  ICSI Preventive Guidelines  Dietary Guidelines for Americans, 2010  USDA's MyPlate  ASA Prophylaxis  Lung CA Screening    Lin Brown PA-C  [facility] [place]  "

## 2022-02-22 ASSESSMENT — ANXIETY QUESTIONNAIRES: GAD7 TOTAL SCORE: 3

## 2022-04-12 ENCOUNTER — OFFICE VISIT (OUTPATIENT)
Dept: FAMILY MEDICINE | Facility: CLINIC | Age: 27
End: 2022-04-12
Payer: COMMERCIAL

## 2022-04-12 VITALS
SYSTOLIC BLOOD PRESSURE: 138 MMHG | TEMPERATURE: 97.8 F | HEART RATE: 107 BPM | BODY MASS INDEX: 23.89 KG/M2 | OXYGEN SATURATION: 98 % | RESPIRATION RATE: 15 BRPM | WEIGHT: 136 LBS | DIASTOLIC BLOOD PRESSURE: 88 MMHG

## 2022-04-12 DIAGNOSIS — R10.2 PELVIC PAIN IN FEMALE: Primary | ICD-10-CM

## 2022-04-12 DIAGNOSIS — J30.2 SEASONAL ALLERGIC RHINITIS, UNSPECIFIED TRIGGER: ICD-10-CM

## 2022-04-12 DIAGNOSIS — R14.2 BELCHING SYMPTOM: ICD-10-CM

## 2022-04-12 DIAGNOSIS — K64.4 EXTERNAL HEMORRHOIDS: ICD-10-CM

## 2022-04-12 LAB — HCG UR QL: NEGATIVE

## 2022-04-12 PROCEDURE — 81025 URINE PREGNANCY TEST: CPT | Performed by: PHYSICIAN ASSISTANT

## 2022-04-12 PROCEDURE — 99214 OFFICE O/P EST MOD 30 MIN: CPT | Performed by: PHYSICIAN ASSISTANT

## 2022-04-12 ASSESSMENT — PAIN SCALES - GENERAL: PAINLEVEL: NO PAIN (0)

## 2022-04-12 NOTE — PATIENT INSTRUCTIONS
Omeprazole 20 mg daily for one month - if no improvement in the digestive problems - we will send you to a gastroenterologist.     Tucks pads are very soothing for hemorrhoids or anal fissures - use after wiping.  Ok to also use hydrocortisone cream.       Call to schedule your imaging test at [facility]:  [phone]

## 2022-04-12 NOTE — PROGRESS NOTES
Assessment & Plan   Problem List Items Addressed This Visit    None     Visit Diagnoses     Pelvic pain in female    -  Primary    Relevant Orders    HCG Qual, Urine ([id number]) (Completed)    US Pelvic Complete with Transvaginal (Completed)         Patient with multiple concerns today.   We have focused on the abdominal pain.   Pelvic ultrasound ordered for eval of pelvic pain/dysparyunia.  Pregnancy test is negative.  Patient declines STI testing - single, monogamous partner.  IUD in place.     For GERD/digestion issues - I would like her to start with PPI and see if this improves.     For allergies - consistent use of nasal steroid is best - may add other OTC allergy treatments as needed.     Patient Instructions   Omeprazole 20 mg daily for one month - if no improvement in the digestive problems - we will send you to a gastroenterologist.     Tucks pads are very soothing for hemorrhoids or anal fissures - use after wiping.  Ok to also use hydrocortisone cream.       Call to schedule your imaging test at [facility]:  [phone]                       No follow-ups on file.    Lin Brown PA-C  [facility] [place]    Emelyn Almeida is a 27 year old who presents for the following health issues     History of Present Illness       Reason for visit:  Stomach issues and allergies  Symptom onset:  More than a month  Symptoms include:  Indigestion and ear pain  Symptom intensity:  Moderate  Symptom progression:  Worsening  Had these symptoms before:  No  What makes it worse:  Eating  What makes it better:  No    She eats 4 or more servings of fruits and vegetables daily.She consumes 0 sweetened beverage(s) daily.She exercises with enough effort to increase her heart rate 20 to 29 minutes per day.  She exercises with enough effort to increase her heart rate 5 days per week.   She is taking medications regularly.       - Lump on the left side of her neck - been there  for 3 weeks  Not tender  Only on the left  Squishy - not like a lymph node  No recent infections  No fevers    - cannot eat  Belching after eating   Severe odor - like what she was eating  Has this with all foods  Reflux, nausea  Getting worse  Made some diet changes  Occasional issues with food getting stuck  Had an episode where she vomited 8 hours after eating and food was still undigested  TUMS - used only for heartburn  Tried digestive enzymes - no change    - ear pain most days  Started 2 years ago with allergies  Now 2/3 days ear pain, more frequent  Comes and goes  It can be in either ear  Takes claritin daily, sometimes steroid nasal spray    - eye concerns  Just saw an eye doctor and eyes are healthy  Trouble getting eyes to focus  Worse when she is tired, or in different types of lighting  Vision concerns for the past 2 weeks    - trouble sleeping  Getting better  [place] like twilight sleep   Now sleeping better but wakes up 1-4 times a night.  Able to fall back asleep.     - she has abdominal during sex or when ovulating  Happening for the past month  Left or right lower abdominal pain  Does not matter when is her cycle  Mirena IUD - still gets a period  No concern for STI  Partner had a vascectomy     - recurrent hemorrhoids  - high blood pressure    Review of Systems         Objective    /88   Pulse 107   Temp 97.8  F (36.6  C) (Tympanic)   Resp 15   Wt 61.7 kg (136 lb)   LMP 04/10/2022   SpO2 98%   BMI 23.89 kg/m    Body mass index is 23.89 kg/m .  Physical Exam  Constitutional:       General: She is not in acute distress.     Appearance: She is well-developed. She is not diaphoretic.   HENT:      Head: Normocephalic.      Right Ear: External ear normal.      Left Ear: External ear normal.      Nose: Nose normal.   Eyes:      Conjunctiva/sclera: Conjunctivae normal.   Neck:      Thyroid: No thyromegaly.   Pulmonary:      Effort: Pulmonary effort is normal.   Abdominal:      General: Abdomen  is flat.      Palpations: Abdomen is soft. There is no hepatomegaly, splenomegaly or mass.      Tenderness: There is abdominal tenderness in the suprapubic area. There is no guarding or rebound.   Musculoskeletal:      Cervical back: Normal range of motion. No rigidity. Normal range of motion.   Lymphadenopathy:      Cervical: No cervical adenopathy.   Neurological:      Mental Status: She is alert and oriented to person, place, and time.   Psychiatric:         Judgment: Judgment normal.            Results for orders placed or performed in visit on 04/12/22   HCG Qual, Urine ([id number])     Status: Normal   Result Value Ref Range    hCG Urine Qualitative Negative Negative

## 2022-04-13 NOTE — RESULT ENCOUNTER NOTE
Elle    Your lab tests are complete and I have reviewed the results.     Your pregnancy test is negative as expected.     If you have any questions or concerns, please feel free to call or send a [employer] message.    Sincerely,  Dionisio Brown PA-C

## 2022-04-20 ENCOUNTER — HOSPITAL ENCOUNTER (OUTPATIENT)
Dept: ULTRASOUND IMAGING | Facility: CLINIC | Age: 27
Discharge: HOME | End: 2022-04-20
Attending: PHYSICIAN ASSISTANT | Admitting: PHYSICIAN ASSISTANT
Payer: COMMERCIAL

## 2022-04-20 DIAGNOSIS — R10.2 PELVIC PAIN IN FEMALE: ICD-10-CM

## 2022-04-20 PROCEDURE — 76856 US EXAM PELVIC COMPLETE: CPT

## 2022-04-21 ENCOUNTER — TELEPHONE (OUTPATIENT)
Dept: FAMILY MEDICINE | Facility: CLINIC | Age: 27
End: 2022-04-21
Payer: COMMERCIAL

## 2022-04-21 NOTE — TELEPHONE ENCOUNTER
Called patient to reschedule her for IUD placement/replacement as she was scheduled with Dr Lozada, who does not do that procedure.     Sent Teams msg to Dr Peters to see if he can squeeze her into one of his same days slots next week and waiting for his response.     Patient states she needs appt asap as the IUD is embedded in the lining of her uterus.     I told patient I will call her back 4/22 in the am after I hear back from Dr Peters.

## 2022-04-22 ENCOUNTER — NURSE TRIAGE (OUTPATIENT)
Dept: NURSING | Facility: CLINIC | Age: 27
End: 2022-04-22
Payer: COMMERCIAL

## 2022-04-22 NOTE — TELEPHONE ENCOUNTER
"Pt is calling in after having an ultrasound to check on the placement of her IUD. Pt looked into her Mychart and was advised that her IUD may be embedded in lining, and she was advised to contact her clinic. Pt reports she cannot feel the string. Pt complains of intermittent mild abdominal pain for the past 1-2 days, and she rates it a \"4\". Pt has been bleeding continuously since 4/19/2022, and it is bleeding about the same as her normal period, but looks darker.    Care advice given, and per protocol pt should be evaluated today or tomorrow in the clinic. Pt was transferred to scheduling to make an appointment. Pt was advised if she is unable to get an appointment in the clinic she should go to the Urgent Care clinic. Pt was advised to:  CALL BACK IF:   * Severe abdominal pain or severe vaginal bleeding occurs  * Constant abdominal pain lasts more than 2 hours  * Menstrual cramps that are not adequately relieved by ibuprofen or naproxen  * You become worse    Pt verbalized understanding.       Heron Rendon RN on 4/22/2022 at 2:36 PM       Reason for Disposition    MILD abdominal pain (e.g., does not interfere with normal activities) that comes and goes (cramps) and present > 48 hours    Additional Information    Negative: Shock suspected (e.g., cold/pale/clammy skin, too weak to stand, low BP, rapid pulse)    Negative: Sounds like a life-threatening emergency to the triager    Negative: Abdominal pain and pregnant < 20 weeks    Negative: Vaginal bleeding and pregnant < 20 weeks    Negative: Vaginal discharge is main symptom    Negative: SEVERE abdominal pain (e.g., excruciating) and present > 1 hour    Negative: SEVERE vaginal bleeding (e.g., soaking 2 pads or tampons per hour) and present 2 or more hours    Negative: MODERATE vaginal bleeding (e.g., soaking 1 pad or tampon per hour and present > 6 hours)    Negative: Constant abdominal pain and present > 2 hours    Negative: Patient sounds very sick or weak " to the triager    Negative: Caller has URGENT question and triager unable to answer question    Protocols used: CONTRACEPTION - IUD SYMPTOMS AND [id number]

## 2022-05-04 ENCOUNTER — MYC MEDICAL ADVICE (OUTPATIENT)
Dept: FAMILY MEDICINE | Facility: CLINIC | Age: 27
End: 2022-05-04
Payer: COMMERCIAL

## 2022-05-06 ENCOUNTER — NURSE TRIAGE (OUTPATIENT)
Dept: FAMILY MEDICINE | Facility: CLINIC | Age: 27
End: 2022-05-06
Payer: COMMERCIAL

## 2022-05-06 DIAGNOSIS — K11.1 ENLARGED SALIVARY GLAND: Primary | ICD-10-CM

## 2022-05-06 NOTE — TELEPHONE ENCOUNTER
Patient needs triage of symptoms reported in the following [employer] message:      Lump in throat  5/4/2022 3:59 PM Reply    To: [facility] TRIAGE      From: Elle SALINAS Aguilar      Created: 5/4/2022 3:59 PM        *-*-*This message has not been handled.*-*-*    I'm not sure if the lump in my throat that I mentioned in my last appointment with you has gotten bigger, but I have had persistent discomfort in that area and discomfort that has spread to my jaw and the other side of my throat. Do you think I should make an appointment with an ENT doctor?        Nurse Triage [facility]    Is this a 2nd Level Triage? NO,      Situation: Patient reports that the lump that she reported back in February of March is now tender. States that Fitterer thought it could be a salivary gland.  Reports as 3/10. Size is smaller than a ping pong ball. States that the lump now seems to connect to her left ear. States that she has left ear pain that comes and goes for the last year.    Background: no known background    Assessment: Unknown mass below left jaw.     Protocol Recommended Disposition:   See Within 2 Weeks In Office    Recommendation: patient is requesting referral to ENT.      Routed to provider    Does the patient meet one of the following criteria for ADS visit consideration? 16+ years old, with an [facility] PCP     TIP  Providers, please consider if this condition is appropriate for management at one of our Acute and Diagnostic Services sites.     If patient is a good candidate, please use dotphrase <dot>triageresponse and select Refer to ADS to document.    Reason for Disposition    Large node present > 2 weeks    Additional Information    Negative: Sounds like a life-threatening emergency to the triager    Negative: Sore throat is the main symptom and has swollen node in the neck that is < 1 inch (2.5 cm) in size    Negative: Node is in the neck and causes difficulty breathing    Negative: Patient sounds very sick or weak to the triager     "Negative: Node is in the neck and can't swallow fluids    Negative: Fever > 103 F (39.4 C)    Negative: Lump or swelling in groin and pulsating (like heartbeat)    Negative: Single large node and size > 1 inch (2.5 cm)    Negative: Overlying skin is red    Negative: Rapid increase in size of node over several hours    Negative: Tender node in the groin and has a sore, scratch, cut, or painful red area on that leg    Negative: Tender node in the armpit and has a sore, scratch, cut, or painful red area on that arm    Negative: Tender node in the neck and also has a sore throat with minimal/no runny nose or cough    Negative: Fever present > 3 days (72 hours)    Negative: Large nodes at multiple locations    Negative: Very tender to the touch but no fever    Negative: Patient wants to be seen    Answer Assessment - Initial Assessment Questions  1. LOCATION: \"Where is the swollen node located?\" \"Is the matching node on the other side of the body also swollen?\"       Left side of jaw, 1/2 from the chin to the end of the jaw.   2. SIZE: \"How big is the node?\" (Inches or centimeters) (or compare to common objects such as pea, bean, marble, golf ball)       Slightly smaller than a ping pong ball.  3. ONSET: \"When did the swelling start?\"       February visit  4. NECK NODES: \"Is there a sore throat, runny nose or other symptoms of a cold?\"       Scratchy feeling in throat that started last weeks. Patient states that it could be allergies.   5. GROIN OR ARMPIT NODES: \"Is there a sore, scratch, cut or painful red area on that arm or leg?\"       no  6. FEVER: \"Do you have a fever?\" If so, ask: \"What is it, how was it measured, and when did it start?\"       no  7. CAUSE: \"What do you think is causing the swollen lymph nodes?\"      unknown  8. OTHER SYMPTOMS: \"Do you have any other symptoms?\"      No other symptoms  9. PREGNANCY: \"Is there any chance you are pregnant?\" \"When was your last menstrual period?\"      No, LMP " 4/10/22    Protocols used: LYMPH NODES - [id number]    Nichole Archer RN

## 2022-05-06 NOTE — TELEPHONE ENCOUNTER
Provider Response to 2nd Level Triage Request    I have reviewed the RN documentation. My recommendation is:  Best addressed by PCP/specialist ENT     Referral placed for ENT.   Patient should call to schedule:  [facility], [address]   [address]   [address] [address]   Phone: [phone]         For now, I would treat this as a blocked salivary duct.   Drink plenty of fluids  Suck on something tart or sour to promote salivation  Use a warm compress over the area    Dionisio Brown PA-C

## 2022-05-06 NOTE — TELEPHONE ENCOUNTER
See triage telephone encounter. [employer] message sent to the patient advising to call triage. Nichole Archer RN

## 2022-05-09 NOTE — TELEPHONE ENCOUNTER
[facility] message from Lin Brown PA-C   sent to the patient. Nichole Archer RN

## 2022-06-06 ENCOUNTER — MYC MEDICAL ADVICE (OUTPATIENT)
Dept: FAMILY MEDICINE | Facility: CLINIC | Age: 27
End: 2022-06-06
Payer: COMMERCIAL

## 2022-06-06 DIAGNOSIS — F90.2 ATTENTION DEFICIT HYPERACTIVITY DISORDER (ADHD), COMBINED TYPE: ICD-10-CM

## 2022-06-08 RX ORDER — LISDEXAMFETAMINE DIMESYLATE 40 MG/1
40 CAPSULE ORAL DAILY
Qty: 30 CAPSULE | Refills: 0 | Status: SHIPPED | OUTPATIENT
Start: 2022-07-09 | End: 2022-08-08

## 2022-06-08 RX ORDER — LISDEXAMFETAMINE DIMESYLATE 40 MG/1
40 CAPSULE ORAL DAILY
Qty: 30 CAPSULE | Refills: 0 | Status: SHIPPED | OUTPATIENT
Start: 2022-06-08 | End: 2022-07-08

## 2022-06-08 RX ORDER — LISDEXAMFETAMINE DIMESYLATE 40 MG/1
40 CAPSULE ORAL DAILY
Qty: 30 CAPSULE | Refills: 0 | Status: CANCELLED | OUTPATIENT
Start: 2022-06-08

## 2022-06-08 RX ORDER — LISDEXAMFETAMINE DIMESYLATE 40 MG/1
40 CAPSULE ORAL DAILY
Qty: 30 CAPSULE | Refills: 0 | Status: SHIPPED | OUTPATIENT
Start: 2022-08-09 | End: 2022-09-08 | Stop reason: DRUGHIGH

## 2022-06-08 NOTE — TELEPHONE ENCOUNTER
Please see mychart from patient and advise appropriate course of action.    Medication pended for review     Kishore Rae RN  [facility] Triage Nurse

## 2022-07-05 ENCOUNTER — OFFICE VISIT (OUTPATIENT)
Dept: URGENT CARE | Facility: URGENT CARE | Age: 27
End: 2022-07-05
Payer: COMMERCIAL

## 2022-07-05 VITALS
BODY MASS INDEX: 23.47 KG/M2 | DIASTOLIC BLOOD PRESSURE: 86 MMHG | OXYGEN SATURATION: 100 % | HEART RATE: 87 BPM | SYSTOLIC BLOOD PRESSURE: 126 MMHG | WEIGHT: 133.6 LBS | TEMPERATURE: 98.5 F

## 2022-07-05 DIAGNOSIS — F90.9 ATTENTION DEFICIT HYPERACTIVITY DISORDER (ADHD), UNSPECIFIED ADHD TYPE: ICD-10-CM

## 2022-07-05 DIAGNOSIS — R07.9 ACUTE CHEST PAIN: Primary | ICD-10-CM

## 2022-07-05 PROCEDURE — 99214 OFFICE O/P EST MOD 30 MIN: CPT | Performed by: PHYSICIAN ASSISTANT

## 2022-07-05 PROCEDURE — 93000 ELECTROCARDIOGRAM COMPLETE: CPT | Performed by: PHYSICIAN ASSISTANT

## 2022-07-05 NOTE — PROGRESS NOTES
Chief Complaint   Patient presents with     Breathing Problem     Breathing feels weird. Onset- Couple of months sx worsened the past week     Heart Problem     Heart palpitations earlier today      Generalized Weakness     Difficulty walking            ASSESSMENT:     ICD-10-CM    1. Acute chest pain  R07.9    2. Attention deficit hyperactivity disorder (ADHD), unspecified ADHD type  F90.9            PLAN: Chest tightness for the past 2 months, worse the past week.  COVID 1 month ago.  ADHD medication for several years.  Lungs sound clear.  O2 level is 100%.  EKG with rate of 75, sinus rhythm, within normal limits.  Unable to rule out things such as pulmonary embolism, pericarditis, cardiomyopathy.  To ER for further evaluation and treatment.  I have discussed clinical findings with patient.  Side effects of medications discussed.  Symptomatic care is discussed.  I have discussed the possibility of  worsening symptoms and indication to RTC or go to the ER if they occur.  All questions are answered, patient indicates understanding of these issues and is in agreement with plan.   Patient care instructions are discussed/given at the end of visit.   Lots of rest and fluids.      Steph Solorzano PA-C      SUBJECTIVE:  26 yo female presents for a weird chest tightness across the chest, present for about 2 months but worse the past week.  States she has a history of palpitations most of her life but more so over the past 2 months.  They usually last a few seconds.  She denies shortness of breath but states it is just a weird feeling in her chest.  No nausea, vomiting.  Some headache and occasional dizziness.  The headache moves around.  No recent COVID vaccination.  Did have COVID 1 month ago.  Took 1 week for her to recovery.  Seasonal allergies, no asthma.  Has had some bilateral calf cramping intermittently for the last 4 days.  No calf swelling.  Is on medication for ADHD for several years.  No long car rides or  airplane rides recently.      Allergies   Allergen Reactions     Latex Rash       Past Medical History:   Diagnosis Date     ADHD      Anxiety      Depressive disorder      OCD (obsessive compulsive disorder)      UTI (lower urinary tract infection)        IBUPROFEN PO, Take 400 mg by mouth   lisdexamfetamine (VYVANSE) 40 MG capsule, Take 1 capsule (40 mg) by mouth daily for 30 days  [START ON 7/9/2022] lisdexamfetamine (VYVANSE) 40 MG capsule, Take 1 capsule (40 mg) by mouth daily for 30 days  [START ON 8/9/2022] lisdexamfetamine (VYVANSE) 40 MG capsule, Take 1 capsule (40 mg) by mouth daily for 30 days  loratadine (CLARITIN) 10 MG tablet, Take 10 mg by mouth daily  levonorgestrel (MIRENA) 20 MCG/24HR IUD, 1 each by Intrauterine route once (Patient not taking: Reported on 7/5/2022)    No current facility-administered medications on file prior to visit.      Social History     Tobacco Use     Smoking status: Never Smoker     Smokeless tobacco: Never Used   Substance Use Topics     Alcohol use: Yes     Comment: socially       ROS:  CONSTITUTIONAL: Negative for fatigue or fever.  EYES: Negative for eye problems.  ENT: Negative for sore throat.    RESP: Negative for cough.    CV: As above   GI: Negative for vomiting.  MUSCULOSKELETAL:  Negative for significant muscle or joint pains.  NEUROLOGIC: Negative for headaches.  SKIN: Negative for rash.  PSYCH: Normal mentation for age.    OBJECTIVE:  /86 (BP Location: Left arm, Patient Position: Sitting, Cuff Size: Adult Regular)   Pulse 87   Temp 98.5  F (36.9  C) (Tympanic)   Wt 60.6 kg (133 lb 9.6 oz)   SpO2 100%   BMI 23.47 kg/m    GENERAL APPEARANCE: Healthy, alert and no distress.  EYES:Conjunctiva/sclera clear.  EARS: No cerumen.   Ear canals w/o erythema.  TM's intact w/o erythema.    THROAT: No erythema w/o tonsillar enlargement . No exudates.  NECK: Supple, nontender, no lymphadenopathy.  RESP: Lungs clear to auscultation - no rales, rhonchi or  wheezes  CV: Regular rate and rhythm, normal S1 S2, no murmur noted.  NEURO: Awake, alert    SKIN: No rashes  Chest wall nontender to palpation.  No calf swelling.  Negative Homans.  Calf is nontender.  Good dorsalis pedis pulses.  Abdomen-soft, nontender.  No hepatosplenomegaly.  Steph Solorzano PA-C

## 2022-07-05 NOTE — PATIENT INSTRUCTIONS
"\"Weird chest tightness\" for the past 2 months, worse the past week.  COVID 1 month ago.  Lungs sound clear.  O2 level is 100%.  EKG with rate of 75, sinus rhythm, within normal limits.  Unable to rule out things such as pulmonary embolism, pericarditis, cardiomyopathy.  To ER for further evaluation and treatment.  "

## 2022-07-12 ENCOUNTER — OFFICE VISIT (OUTPATIENT)
Dept: OBGYN | Facility: CLINIC | Age: 27
End: 2022-07-12
Payer: COMMERCIAL

## 2022-07-12 VITALS
WEIGHT: 134 LBS | HEART RATE: 96 BPM | OXYGEN SATURATION: 99 % | SYSTOLIC BLOOD PRESSURE: 125 MMHG | BODY MASS INDEX: 23.54 KG/M2 | DIASTOLIC BLOOD PRESSURE: 79 MMHG

## 2022-07-12 DIAGNOSIS — M54.50 BILATERAL LOW BACK PAIN WITHOUT SCIATICA, UNSPECIFIED CHRONICITY: ICD-10-CM

## 2022-07-12 DIAGNOSIS — N89.8 VAGINAL DISCHARGE: ICD-10-CM

## 2022-07-12 DIAGNOSIS — R10.9 ABDOMINAL DISCOMFORT: Primary | ICD-10-CM

## 2022-07-12 PROCEDURE — 87210 SMEAR WET MOUNT SALINE/INK: CPT | Performed by: OBSTETRICS & GYNECOLOGY

## 2022-07-12 PROCEDURE — 99214 OFFICE O/P EST MOD 30 MIN: CPT | Performed by: OBSTETRICS & GYNECOLOGY
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
## 2022-07-12 NOTE — PATIENT INSTRUCTIONS
If you have any questions regarding your visit, Please contact your care team.    To Schedule an Appointment 24/7  Call: [phone]  [facility]  TELEPHONE NUMBER   Kevin Boggs M.D.    Jil-Medical Assistant    Cory Olivier-YADY Cosme-Surgery Scheduler  Desiree-Surgery Scheduler Tuesday-[place]                   7:30 a.m.-3:30 p.m.  Wednesday-[place]             8:00 a.m.-4:30 p.m.  Thursday-[place]     8:00 a.m.-4:00 p.m.  Friday-[place]                       7:30 a.m.-1:00 p.m. [address] [place] [id number] [phone] Fax    Imaging Scheduling all locations  [phone]     [facility]  [address]  [place], [place] [address] [phone]    [facility]  [address] [place] [address] [phone] ask for Women's Clinic     **Surgeries** Our Surgery Schedulers will contact you to schedule. If you do not receive a call within 3 business days, please call [phone].    Urgent Care locations:  [facility] Monday-Friday                 10 am - 8 pm  Saturday and Sunday        9 am - 5 pm   [phone]   If you need a medication refill, please contact your pharmacy. Please allow 3 business days for your refill to be completed.  As always, Thank you for trusting us with your healthcare needs!  If you have any questions regarding your visit, Please contact your care team.    [employer]: [phone]    To Schedule an Appointment 24/7  Call: [phone]    see additional instructions from your care team below

## 2022-09-08 ENCOUNTER — VIRTUAL VISIT (OUTPATIENT)
Dept: FAMILY MEDICINE | Facility: CLINIC | Age: 27
End: 2022-09-08
Payer: COMMERCIAL

## 2022-09-08 DIAGNOSIS — F90.2 ATTENTION DEFICIT HYPERACTIVITY DISORDER (ADHD), COMBINED TYPE: ICD-10-CM

## 2022-09-08 DIAGNOSIS — F41.1 GAD (GENERALIZED ANXIETY DISORDER): Primary | ICD-10-CM

## 2022-09-08 PROCEDURE — 99214 OFFICE O/P EST MOD 30 MIN: CPT | Mod: GT | Performed by: PHYSICIAN ASSISTANT

## 2022-09-08 RX ORDER — LISDEXAMFETAMINE DIMESYLATE 30 MG/1
30 CAPSULE ORAL DAILY
Qty: 30 CAPSULE | Refills: 0 | Status: SHIPPED | OUTPATIENT
Start: 2022-10-09 | End: 2022-11-08

## 2022-09-08 RX ORDER — LISDEXAMFETAMINE DIMESYLATE 30 MG/1
30 CAPSULE ORAL DAILY
Qty: 30 CAPSULE | Refills: 0 | Status: SHIPPED | OUTPATIENT
Start: 2022-09-08 | End: 2022-10-08

## 2022-09-08 RX ORDER — LISDEXAMFETAMINE DIMESYLATE 30 MG/1
30 CAPSULE ORAL DAILY
Qty: 30 CAPSULE | Refills: 0 | Status: SHIPPED | OUTPATIENT
Start: 2022-11-09 | End: 2022-12-09

## 2022-09-08 RX ORDER — HYDROXYZINE HYDROCHLORIDE 25 MG/1
25 TABLET, FILM COATED ORAL 3 TIMES DAILY PRN
Qty: 30 TABLET | Refills: 1 | Status: SHIPPED | OUTPATIENT
Start: 2022-09-08 | End: 2023-06-12

## 2022-09-08 ASSESSMENT — ANXIETY QUESTIONNAIRES
GAD7 TOTAL SCORE: 11
1. FEELING NERVOUS, ANXIOUS, OR ON EDGE: MORE THAN HALF THE DAYS
4. TROUBLE RELAXING: SEVERAL DAYS
GAD7 TOTAL SCORE: 11
5. BEING SO RESTLESS THAT IT IS HARD TO SIT STILL: MORE THAN HALF THE DAYS
IF YOU CHECKED OFF ANY PROBLEMS ON THIS QUESTIONNAIRE, HOW DIFFICULT HAVE THESE PROBLEMS MADE IT FOR YOU TO DO YOUR WORK, TAKE CARE OF THINGS AT HOME, OR GET ALONG WITH OTHER PEOPLE: SOMEWHAT DIFFICULT
6. BECOMING EASILY ANNOYED OR IRRITABLE: MORE THAN HALF THE DAYS
7. FEELING AFRAID AS IF SOMETHING AWFUL MIGHT HAPPEN: MORE THAN HALF THE DAYS
3. WORRYING TOO MUCH ABOUT DIFFERENT THINGS: SEVERAL DAYS
8. IF YOU CHECKED OFF ANY PROBLEMS, HOW DIFFICULT HAVE THESE MADE IT FOR YOU TO DO YOUR WORK, TAKE CARE OF THINGS AT HOME, OR GET ALONG WITH OTHER PEOPLE?: SOMEWHAT DIFFICULT
7. FEELING AFRAID AS IF SOMETHING AWFUL MIGHT HAPPEN: MORE THAN HALF THE DAYS
2. NOT BEING ABLE TO STOP OR CONTROL WORRYING: SEVERAL DAYS

## 2022-09-08 ASSESSMENT — PATIENT HEALTH QUESTIONNAIRE - PHQ9
10. IF YOU CHECKED OFF ANY PROBLEMS, HOW DIFFICULT HAVE THESE PROBLEMS MADE IT FOR YOU TO DO YOUR WORK, TAKE CARE OF THINGS AT HOME, OR GET ALONG WITH OTHER PEOPLE: SOMEWHAT DIFFICULT
SUM OF ALL RESPONSES TO PHQ QUESTIONS 1-9: 10
SUM OF ALL RESPONSES TO PHQ QUESTIONS 1-9: 10

## 2022-09-08 NOTE — PROGRESS NOTES
Elle is a 27 year old who is being evaluated via a billable video visit.      How would you like to obtain your AVS? MyChart  If the video visit is dropped, the invitation should be resent by: Text to cell phone: [phone]  Will anyone else be joining your video visit? No          Assessment & Plan   Problem List Items Addressed This Visit        Behavioral    PABLO (generalized anxiety disorder) - Primary    Relevant Medications    hydrOXYzine (ATARAX) 25 MG tablet      Other Visit Diagnoses     Attention deficit hyperactivity disorder (ADHD), combined type        Relevant Medications    lisdexamfetamine (VYVANSE) 30 MG capsule    lisdexamfetamine (VYVANSE) 30 MG capsule (Start on 10/9/2022)    lisdexamfetamine (VYVANSE) 30 MG capsule (Start on 11/9/2022)         Decrease Vyvanse to 30 mg daily, then recheck in 6 months or sooner if needed.   For anxiety/panic attacks - we will trial hydroxyzine.                   No follow-ups on file.    Lin Brown PA-C  [facility]   Elle is a 27 year old, presenting for the following health issues:  Recheck Medication      History of Present Illness       Reason for visit:  Discuss meds    She eats 2-3 servings of fruits and vegetables daily.She consumes 0 sweetened beverage(s) daily.She exercises with enough effort to increase her heart rate 20 to 29 minutes per day.  She exercises with enough effort to increase her heart rate 5 days per week.   She is taking medications regularly.    Today's PHQ-9         PHQ-9 Total Score: 10    PHQ-9 Q9 Thoughts of better off dead/self-harm past 2 weeks :   Not at all    How difficult have these problems made it for you to do your work, take care of things at home, or get along with other people: Somewhat difficult  Today's PABLO-7 Score: 11     Sometimes makes her jittery - cannot tell why, no connection with food, or time of day.  Increased anxiety and some panic attacks and  difficulty sleeping after losing 2 grandparents.            Review of Systems         Objective           Vitals:  No vitals were obtained today due to virtual visit.    Physical Exam   GENERAL: Healthy, alert and no distress  EYES: Eyes grossly normal to inspection.  No discharge or erythema, or obvious scleral/conjunctival abnormalities.  RESP: No audible wheeze, cough, or visible cyanosis.  No visible retractions or increased work of breathing.    SKIN: Visible skin clear. No significant rash, abnormal pigmentation or lesions.  NEURO: Cranial nerves grossly intact.  Mentation and speech appropriate for age.  PSYCH: Mentation appears normal, affect normal/bright, judgement and insight intact, normal speech and appearance well-groomed.                Video-Visit Details    Video Start Time: 1:05    Type of service:  Video Visit    Video End Time:1:14 PM    Originating Location (pt. Location): Home    Distant Location (provider location):  [facility]     Platform used for Video Visit: [employer]

## 2022-09-11 ENCOUNTER — HEALTH MAINTENANCE LETTER (OUTPATIENT)
Age: 27
End: 2022-09-11

## 2022-10-15 DIAGNOSIS — F90.2 ATTENTION DEFICIT HYPERACTIVITY DISORDER (ADHD), COMBINED TYPE: ICD-10-CM

## 2022-10-17 RX ORDER — LISDEXAMFETAMINE DIMESYLATE 30 MG/1
30 CAPSULE ORAL DAILY
Qty: 30 CAPSULE | Refills: 0 | Status: SHIPPED | OUTPATIENT
Start: 2022-11-17 | End: 2022-12-17

## 2022-10-17 RX ORDER — LISDEXAMFETAMINE DIMESYLATE 30 MG/1
30 CAPSULE ORAL DAILY
Qty: 30 CAPSULE | Refills: 0 | Status: SHIPPED | OUTPATIENT
Start: 2022-10-17 | End: 2022-11-16

## 2022-10-17 RX ORDER — LISDEXAMFETAMINE DIMESYLATE 30 MG/1
30 CAPSULE ORAL DAILY
Qty: 30 CAPSULE | Refills: 0 | Status: SHIPPED | OUTPATIENT
Start: 2022-12-18 | End: 2023-01-19

## 2022-10-17 RX ORDER — LISDEXAMFETAMINE DIMESYLATE 30 MG/1
CAPSULE ORAL
Qty: 30 CAPSULE | Refills: 0 | OUTPATIENT
Start: 2022-10-17

## 2022-12-17 ENCOUNTER — MYC REFILL (OUTPATIENT)
Dept: FAMILY MEDICINE | Facility: CLINIC | Age: 27
End: 2022-12-17

## 2022-12-17 DIAGNOSIS — F90.2 ATTENTION DEFICIT HYPERACTIVITY DISORDER (ADHD), COMBINED TYPE: ICD-10-CM

## 2022-12-19 ENCOUNTER — MYC MEDICAL ADVICE (OUTPATIENT)
Dept: FAMILY MEDICINE | Facility: CLINIC | Age: 27
End: 2022-12-19

## 2022-12-19 ENCOUNTER — MYC REFILL (OUTPATIENT)
Dept: FAMILY MEDICINE | Facility: CLINIC | Age: 27
End: 2022-12-19

## 2022-12-19 DIAGNOSIS — F90.2 ATTENTION DEFICIT HYPERACTIVITY DISORDER (ADHD), COMBINED TYPE: ICD-10-CM

## 2022-12-19 RX ORDER — LISDEXAMFETAMINE DIMESYLATE 30 MG/1
30 CAPSULE ORAL DAILY
Qty: 30 CAPSULE | Refills: 0 | OUTPATIENT
Start: 2022-12-19

## 2023-01-19 ENCOUNTER — MYC REFILL (OUTPATIENT)
Dept: FAMILY MEDICINE | Facility: CLINIC | Age: 28
End: 2023-01-19
Payer: COMMERCIAL

## 2023-01-19 DIAGNOSIS — F90.2 ATTENTION DEFICIT HYPERACTIVITY DISORDER (ADHD), COMBINED TYPE: ICD-10-CM

## 2023-01-20 RX ORDER — LISDEXAMFETAMINE DIMESYLATE 30 MG/1
30 CAPSULE ORAL DAILY
Qty: 30 CAPSULE | Refills: 0 | Status: SHIPPED | OUTPATIENT
Start: 2023-01-20 | End: 2023-03-30

## 2023-02-14 ENCOUNTER — OFFICE VISIT (OUTPATIENT)
Dept: FAMILY MEDICINE | Facility: CLINIC | Age: 28
End: 2023-02-14
Payer: COMMERCIAL

## 2023-02-14 VITALS
BODY MASS INDEX: 23.21 KG/M2 | HEART RATE: 95 BPM | WEIGHT: 131 LBS | TEMPERATURE: 99 F | HEIGHT: 63 IN | SYSTOLIC BLOOD PRESSURE: 118 MMHG | RESPIRATION RATE: 16 BRPM | OXYGEN SATURATION: 99 % | DIASTOLIC BLOOD PRESSURE: 85 MMHG

## 2023-02-14 DIAGNOSIS — G43.109 MIGRAINE WITH AURA AND WITHOUT STATUS MIGRAINOSUS, NOT INTRACTABLE: Primary | ICD-10-CM

## 2023-02-14 DIAGNOSIS — N92.6 MENSTRUAL CHANGES: ICD-10-CM

## 2023-02-14 DIAGNOSIS — G44.209 TENSION HEADACHE: ICD-10-CM

## 2023-02-14 DIAGNOSIS — M54.2 CERVICALGIA: ICD-10-CM

## 2023-02-14 PROCEDURE — 84443 ASSAY THYROID STIM HORMONE: CPT | Performed by: PHYSICIAN ASSISTANT

## 2023-02-14 PROCEDURE — 36415 COLL VENOUS BLD VENIPUNCTURE: CPT | Performed by: PHYSICIAN ASSISTANT

## 2023-02-14 PROCEDURE — 82306 VITAMIN D 25 HYDROXY: CPT | Performed by: PHYSICIAN ASSISTANT

## 2023-02-14 PROCEDURE — 99214 OFFICE O/P EST MOD 30 MIN: CPT | Performed by: PHYSICIAN ASSISTANT

## 2023-02-14 RX ORDER — SUMATRIPTAN 50 MG/1
50 TABLET, FILM COATED ORAL
Qty: 10 TABLET | Refills: 1 | Status: SHIPPED | OUTPATIENT
Start: 2023-02-14 | End: 2023-09-20

## 2023-02-14 RX ORDER — METOCLOPRAMIDE 10 MG/1
10 TABLET ORAL 3 TIMES DAILY PRN
Qty: 30 TABLET | Refills: 1 | Status: SHIPPED | OUTPATIENT
Start: 2023-02-14 | End: 2023-09-20

## 2023-02-14 RX ORDER — METAXALONE 800 MG/1
800 TABLET ORAL 3 TIMES DAILY
Qty: 30 TABLET | Refills: 1 | Status: SHIPPED | OUTPATIENT
Start: 2023-02-14 | End: 2023-06-12

## 2023-02-14 ASSESSMENT — PATIENT HEALTH QUESTIONNAIRE - PHQ9
SUM OF ALL RESPONSES TO PHQ QUESTIONS 1-9: 6
SUM OF ALL RESPONSES TO PHQ QUESTIONS 1-9: 6
10. IF YOU CHECKED OFF ANY PROBLEMS, HOW DIFFICULT HAVE THESE PROBLEMS MADE IT FOR YOU TO DO YOUR WORK, TAKE CARE OF THINGS AT HOME, OR GET ALONG WITH OTHER PEOPLE: SOMEWHAT DIFFICULT

## 2023-02-14 ASSESSMENT — PAIN SCALES - GENERAL: PAINLEVEL: NO PAIN (0)

## 2023-02-14 NOTE — PROGRESS NOTES
"  Assessment & Plan      Benign exam, appears to have had an acute migraine last week on top of essentially chronic mild tension HAs, possibly assoc with some menstrual changes,     Problem List Items Addressed This Visit     Tension headache    Relevant Medications    metaxalone (SKELAXIN) 800 MG tablet    SUMAtriptan (IMITREX) 50 MG tablet    Other Relevant Orders    Physical Therapy Referral    Migraine with aura and without status migrainosus, not intractable - Primary    Relevant Medications    metaxalone (SKELAXIN) 800 MG tablet    metoclopramide (REGLAN) 10 MG tablet    SUMAtriptan (IMITREX) 50 MG tablet    Other Relevant Orders    TSH with free T4 reflex    Vitamin D Deficiency   Other Visit Diagnoses     Menstrual changes        Relevant Orders    TSH with free T4 reflex    Cervicalgia        Relevant Orders    Physical Therapy Referral            30 minutes spent on the date of the encounter doing chart review, history and exam, documentation and further activities per the note     Return in about 4 weeks (around 3/14/2023), or if symptoms worsen or fail to improve, for PCP Follow-up.    LEENA Monique  [facility] RACHANA Almeida is a 27 year old presenting for the following health issues:  Headache and Hormones      History of Present Illness       Headaches:   Since the patient's last clinic visit, headaches are: worsened  The patient is getting headaches:  Almost daily  She is not able to do normal daily activities when she has a migraine.  The patient is taking the following rescue/relief medications:  Ibuprofen (Advil, Motrin)   Patient states \"I get only a small amount of relief\" from the rescue/relief medications.   The patient is taking the following medications to prevent migraines:  No medications to prevent migraines  In the past 4 weeks, the patient has gone to an Urgent Care or Emergency Room 0 times times due to headaches.    Reason for visit:  " "Migraines and hormonal issues  Symptom onset:  1-2 weeks ago  Symptoms include:  Migraines, vision issues, hair loss, anxiety during period  Symptom intensity:  Severe  Symptom progression:  Staying the same  Had these symptoms before:  Yes  Has tried/received treatment for these symptoms:  No  What makes it worse:  No  What makes it better:  No    She eats 2-3 servings of fruits and vegetables daily.She consumes 0 sweetened beverage(s) daily.She exercises with enough effort to increase her heart rate 20 to 29 minutes per day.  She exercises with enough effort to increase her heart rate 5 days per week.   She is taking medications regularly.    Today's PHQ-9         PHQ-9 Total Score: 6    PHQ-9 Q9 Thoughts of better off dead/self-harm past 2 weeks :   Not at all    How difficult have these problems made it for you to do your work, take care of things at home, or get along with other people: Somewhat difficult     Normal brain and neck MRi last fall,  Saw neuro last fall, had a pretty extensive lab workup last summer, incluing tsh which was normal.      Prior to headache onset will get visual changes or bright spots, then pain hits.  Pain is mostly frontal around the eyes,  Sometimes get nausea, will get phot and phonophobia,   Mom may get then.  Previously gets one of these a yea,r last week had one that last a couple days, IBU not too helpful.  Sleeping made it better.  Occurred right at end of menses.      Routinely get mild dull headache in band pattern around entire head and into neck during menses, the past couple months has been having increased anxiety leading up to menses, then resolves, notes hair loss as well,  No OCPs  Not sexually active  w/anyone who makes sperm     Review of Systems   NEURO: headache as above      Objective    /85 (BP Location: Left arm, Patient Position: Sitting, Cuff Size: Adult Regular)   Pulse 95   Temp 99  F (37.2  C) (Tympanic)   Resp 16   Ht 1.607 m (5' 3.27\")   Wt " 59.4 kg (131 lb)   LMP 02/06/2023 (Exact Date)   SpO2 99%   BMI 23.01 kg/m    Body mass index is 23.01 kg/m .  Physical Exam     General:   awake, alert, and cooperative.  NAD.   Head: Normocephalic, atraumatic.  Eyes: Conjunctiva clear, non icteric. PERRLA. EOMI.  Nose: No lesions.  Mouth / Throat: Normal dentition.  No oral lesions. Pharynx non erythematous, tonsils without hypertrophy. Tongue midline.  Neck: Supple. ISH grossly.   Neuro: Alert and oriented - normal speech. Cranial nerves intact.  Normal strength. Using extremities freely.

## 2023-02-15 LAB
DEPRECATED CALCIDIOL+CALCIFEROL SERPL-MC: 38 UG/L (ref 20–75)
TSH SERPL DL<=0.005 MIU/L-ACNC: 1.02 MU/L (ref 0.4–4)

## 2023-03-30 ENCOUNTER — VIRTUAL VISIT (OUTPATIENT)
Dept: FAMILY MEDICINE | Facility: CLINIC | Age: 28
End: 2023-03-30
Payer: COMMERCIAL

## 2023-03-30 DIAGNOSIS — F90.2 ATTENTION DEFICIT HYPERACTIVITY DISORDER (ADHD), COMBINED TYPE: ICD-10-CM

## 2023-03-30 PROCEDURE — 99213 OFFICE O/P EST LOW 20 MIN: CPT | Mod: VID | Performed by: PHYSICIAN ASSISTANT

## 2023-03-30 RX ORDER — LISDEXAMFETAMINE DIMESYLATE 40 MG/1
40 CAPSULE ORAL DAILY
Qty: 30 CAPSULE | Refills: 0 | Status: SHIPPED | OUTPATIENT
Start: 2023-04-30 | End: 2023-05-30

## 2023-03-30 RX ORDER — LISDEXAMFETAMINE DIMESYLATE 40 MG/1
40 CAPSULE ORAL DAILY
Qty: 30 CAPSULE | Refills: 0 | Status: SHIPPED | OUTPATIENT
Start: 2023-03-30 | End: 2023-04-29

## 2023-03-30 RX ORDER — LISDEXAMFETAMINE DIMESYLATE 40 MG/1
40 CAPSULE ORAL DAILY
Qty: 30 CAPSULE | Refills: 0 | Status: SHIPPED | OUTPATIENT
Start: 2023-05-31 | End: 2023-06-30

## 2023-03-30 ASSESSMENT — ANXIETY QUESTIONNAIRES
7. FEELING AFRAID AS IF SOMETHING AWFUL MIGHT HAPPEN: NOT AT ALL
IF YOU CHECKED OFF ANY PROBLEMS ON THIS QUESTIONNAIRE, HOW DIFFICULT HAVE THESE PROBLEMS MADE IT FOR YOU TO DO YOUR WORK, TAKE CARE OF THINGS AT HOME, OR GET ALONG WITH OTHER PEOPLE: NOT DIFFICULT AT ALL
4. TROUBLE RELAXING: NOT AT ALL
8. IF YOU CHECKED OFF ANY PROBLEMS, HOW DIFFICULT HAVE THESE MADE IT FOR YOU TO DO YOUR WORK, TAKE CARE OF THINGS AT HOME, OR GET ALONG WITH OTHER PEOPLE?: NOT DIFFICULT AT ALL
GAD7 TOTAL SCORE: 3
2. NOT BEING ABLE TO STOP OR CONTROL WORRYING: SEVERAL DAYS
GAD7 TOTAL SCORE: 3
7. FEELING AFRAID AS IF SOMETHING AWFUL MIGHT HAPPEN: NOT AT ALL
6. BECOMING EASILY ANNOYED OR IRRITABLE: NOT AT ALL
3. WORRYING TOO MUCH ABOUT DIFFERENT THINGS: SEVERAL DAYS
5. BEING SO RESTLESS THAT IT IS HARD TO SIT STILL: NOT AT ALL
1. FEELING NERVOUS, ANXIOUS, OR ON EDGE: SEVERAL DAYS

## 2023-03-30 ASSESSMENT — PATIENT HEALTH QUESTIONNAIRE - PHQ9
10. IF YOU CHECKED OFF ANY PROBLEMS, HOW DIFFICULT HAVE THESE PROBLEMS MADE IT FOR YOU TO DO YOUR WORK, TAKE CARE OF THINGS AT HOME, OR GET ALONG WITH OTHER PEOPLE: VERY DIFFICULT
SUM OF ALL RESPONSES TO PHQ QUESTIONS 1-9: 5
SUM OF ALL RESPONSES TO PHQ QUESTIONS 1-9: 5

## 2023-03-30 NOTE — PROGRESS NOTES
Elle is a 28 year old who is being evaluated via a billable video visit.      How would you like to obtain your AVS? MyChart  If the video visit is dropped, the invitation should be resent by: Text to cell phone: [phone]  Will anyone else be joining your video visit? No          Assessment & Plan   Problem List Items Addressed This Visit    None  Visit Diagnoses     Attention deficit hyperactivity disorder (ADHD), combined type        Relevant Medications    lisdexamfetamine (VYVANSE) 40 MG capsule    lisdexamfetamine (VYVANSE) 40 MG capsule (Start on 4/30/2023)    lisdexamfetamine (VYVANSE) 40 MG capsule (Start on 5/31/2023)         Increase dose to 40 mg daily.   Set up new appt to establish care with new PCP in 3 months.                  Lin Brown PA-C  [facility]    Emelyn Almeida is a 28 year old, presenting for the following health issues:  Recheck Medication  No flowsheet data found.  History of Present Illness       Reason for visit:  Med check    She eats 2-3 servings of fruits and vegetables daily.She consumes 0 sweetened beverage(s) daily.She exercises with enough effort to increase her heart rate 10 to 19 minutes per day.  She exercises with enough effort to increase her heart rate 5 days per week.   She is taking medications regularly.    Today's PHQ-9         PHQ-9 Total Score: 5    PHQ-9 Q9 Thoughts of better off dead/self-harm past 2 weeks :   Not at all    How difficult have these problems made it for you to do your work, take care of things at home, or get along with other people: Very difficult  Today's PABLO-7 Score: 3               Objective           Vitals:  No vitals were obtained today due to virtual visit.    Physical Exam   GENERAL: Healthy, alert and no distress  EYES: Eyes grossly normal to inspection.  No discharge or erythema, or obvious scleral/conjunctival abnormalities.  RESP: No audible wheeze, cough, or visible cyanosis.  No  visible retractions or increased work of breathing.    SKIN: Visible skin clear. No significant rash, abnormal pigmentation or lesions.  NEURO: Cranial nerves grossly intact.  Mentation and speech appropriate for age.  PSYCH: Mentation appears normal, affect normal/bright, judgement and insight intact, normal speech and appearance well-groomed.                Video-Visit Details    Type of service:  Video Visit     Originating Location (pt. Location): Home    Distant Location (provider location):  Off-site  Platform used for Video Visit: Cruz

## 2023-04-21 ENCOUNTER — OFFICE VISIT (OUTPATIENT)
Dept: URGENT CARE | Facility: URGENT CARE | Age: 28
End: 2023-04-21
Payer: COMMERCIAL

## 2023-04-21 VITALS
HEART RATE: 66 BPM | DIASTOLIC BLOOD PRESSURE: 87 MMHG | WEIGHT: 131.9 LBS | SYSTOLIC BLOOD PRESSURE: 136 MMHG | BODY MASS INDEX: 23.17 KG/M2 | OXYGEN SATURATION: 100 % | TEMPERATURE: 99.1 F

## 2023-04-21 DIAGNOSIS — J02.9 SORE THROAT: Primary | ICD-10-CM

## 2023-04-21 DIAGNOSIS — R59.9 SWOLLEN LYMPH NODES: ICD-10-CM

## 2023-04-21 LAB
DEPRECATED S PYO AG THROAT QL EIA: NEGATIVE
GROUP A STREP BY PCR: NOT DETECTED

## 2023-04-21 PROCEDURE — 87651 STREP A DNA AMP PROBE: CPT | Performed by: PHYSICIAN ASSISTANT

## 2023-04-21 PROCEDURE — 99213 OFFICE O/P EST LOW 20 MIN: CPT | Performed by: PHYSICIAN ASSISTANT

## 2023-04-21 ASSESSMENT — ENCOUNTER SYMPTOMS
VOMITING: 0
LIGHT-HEADEDNESS: 0
WEAKNESS: 0
MYALGIAS: 0
BACK PAIN: 0
JOINT SWELLING: 0
DIZZINESS: 0
CARDIOVASCULAR NEGATIVE: 1
EYES NEGATIVE: 1
NECK STIFFNESS: 0
ALLERGIC/IMMUNOLOGIC NEGATIVE: 1
PALPITATIONS: 0
RESPIRATORY NEGATIVE: 1
ENDOCRINE NEGATIVE: 1
SORE THROAT: 1
RHINORRHEA: 0
NECK PAIN: 1
COUGH: 0
SHORTNESS OF BREATH: 0
FEVER: 0
CHILLS: 0
ARTHRALGIAS: 0
NAUSEA: 0
WOUND: 0
DIARRHEA: 0
HEADACHES: 0
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
## 2023-04-30 ENCOUNTER — HEALTH MAINTENANCE LETTER (OUTPATIENT)
Age: 28
End: 2023-04-30

## 2023-06-12 ENCOUNTER — OFFICE VISIT (OUTPATIENT)
Dept: FAMILY MEDICINE | Facility: CLINIC | Age: 28
End: 2023-06-12
Payer: COMMERCIAL

## 2023-06-12 VITALS
SYSTOLIC BLOOD PRESSURE: 132 MMHG | OXYGEN SATURATION: 99 % | HEART RATE: 104 BPM | WEIGHT: 134.1 LBS | TEMPERATURE: 98.5 F | DIASTOLIC BLOOD PRESSURE: 81 MMHG | BODY MASS INDEX: 22.9 KG/M2 | RESPIRATION RATE: 12 BRPM | HEIGHT: 64 IN

## 2023-06-12 DIAGNOSIS — R10.31 CHRONIC BILATERAL LOWER ABDOMINAL PAIN: ICD-10-CM

## 2023-06-12 DIAGNOSIS — F90.2 ATTENTION DEFICIT HYPERACTIVITY DISORDER (ADHD), COMBINED TYPE: ICD-10-CM

## 2023-06-12 DIAGNOSIS — Z00.00 ROUTINE GENERAL MEDICAL EXAMINATION AT A HEALTH CARE FACILITY: Primary | ICD-10-CM

## 2023-06-12 DIAGNOSIS — G89.29 CHRONIC BILATERAL LOWER ABDOMINAL PAIN: ICD-10-CM

## 2023-06-12 DIAGNOSIS — G43.109 MIGRAINE WITH AURA AND WITHOUT STATUS MIGRAINOSUS, NOT INTRACTABLE: ICD-10-CM

## 2023-06-12 DIAGNOSIS — L71.9 ACNE ROSACEA: ICD-10-CM

## 2023-06-12 DIAGNOSIS — R14.0 ABDOMINAL BLOATING: ICD-10-CM

## 2023-06-12 DIAGNOSIS — Z12.4 CERVICAL CANCER SCREENING: ICD-10-CM

## 2023-06-12 DIAGNOSIS — R10.32 CHRONIC BILATERAL LOWER ABDOMINAL PAIN: ICD-10-CM

## 2023-06-12 LAB
ALBUMIN UR-MCNC: NEGATIVE MG/DL
APPEARANCE UR: CLEAR
BACTERIA #/AREA URNS HPF: ABNORMAL /HPF
BASOPHILS # BLD AUTO: 0 10E3/UL (ref 0–0.2)
BASOPHILS NFR BLD AUTO: 0 %
BILIRUB UR QL STRIP: NEGATIVE
COLOR UR AUTO: YELLOW
EOSINOPHIL # BLD AUTO: 0.1 10E3/UL (ref 0–0.7)
EOSINOPHIL NFR BLD AUTO: 2 %
ERYTHROCYTE [DISTWIDTH] IN BLOOD BY AUTOMATED COUNT: 12 % (ref 10–15)
GLUCOSE UR STRIP-MCNC: NEGATIVE MG/DL
HCT VFR BLD AUTO: 42.3 % (ref 35–47)
HGB BLD-MCNC: 14 G/DL (ref 11.7–15.7)
HGB UR QL STRIP: ABNORMAL
IMM GRANULOCYTES # BLD: 0 10E3/UL
IMM GRANULOCYTES NFR BLD: 0 %
KETONES UR STRIP-MCNC: NEGATIVE MG/DL
LEUKOCYTE ESTERASE UR QL STRIP: NEGATIVE
LYMPHOCYTES # BLD AUTO: 1.8 10E3/UL (ref 0.8–5.3)
LYMPHOCYTES NFR BLD AUTO: 25 %
MCH RBC QN AUTO: 31.4 PG (ref 26.5–33)
MCHC RBC AUTO-ENTMCNC: 33.1 G/DL (ref 31.5–36.5)
MCV RBC AUTO: 95 FL (ref 78–100)
MONOCYTES # BLD AUTO: 0.5 10E3/UL (ref 0–1.3)
MONOCYTES NFR BLD AUTO: 7 %
NEUTROPHILS # BLD AUTO: 4.7 10E3/UL (ref 1.6–8.3)
NEUTROPHILS NFR BLD AUTO: 66 %
NITRATE UR QL: NEGATIVE
PH UR STRIP: 6.5 [PH] (ref 5–7)
PLATELET # BLD AUTO: 302 10E3/UL (ref 150–450)
RBC # BLD AUTO: 4.46 10E6/UL (ref 3.8–5.2)
RBC #/AREA URNS AUTO: ABNORMAL /HPF
SP GR UR STRIP: 1.02 (ref 1–1.03)
SQUAMOUS #/AREA URNS AUTO: ABNORMAL /LPF
UROBILINOGEN UR STRIP-ACNC: 0.2 E.U./DL
WBC # BLD AUTO: 7.1 10E3/UL (ref 4–11)
WBC #/AREA URNS AUTO: ABNORMAL /HPF

## 2023-06-12 PROCEDURE — 99395 PREV VISIT EST AGE 18-39: CPT | Performed by: FAMILY MEDICINE

## 2023-06-12 PROCEDURE — 86364 TISS TRNSGLTMNASE EA IG CLAS: CPT | Performed by: FAMILY MEDICINE

## 2023-06-12 PROCEDURE — 80053 COMPREHEN METABOLIC PANEL: CPT | Performed by: FAMILY MEDICINE

## 2023-06-12 PROCEDURE — 36415 COLL VENOUS BLD VENIPUNCTURE: CPT | Performed by: FAMILY MEDICINE

## 2023-06-12 PROCEDURE — 85025 COMPLETE CBC W/AUTO DIFF WBC: CPT | Performed by: FAMILY MEDICINE

## 2023-06-12 PROCEDURE — 99214 OFFICE O/P EST MOD 30 MIN: CPT | Mod: 25 | Performed by: FAMILY MEDICINE

## 2023-06-12 PROCEDURE — G0145 SCR C/V CYTO,THINLAYER,RESCR: HCPCS | Performed by: FAMILY MEDICINE

## 2023-06-12 PROCEDURE — 81001 URINALYSIS AUTO W/SCOPE: CPT | Performed by: FAMILY MEDICINE

## 2023-06-12 RX ORDER — METRONIDAZOLE 7.5 MG/G
GEL TOPICAL 2 TIMES DAILY
Qty: 45 G | Refills: 1 | Status: SHIPPED | OUTPATIENT
Start: 2023-06-12

## 2023-06-12 ASSESSMENT — PAIN SCALES - GENERAL: PAINLEVEL: NO PAIN (0)

## 2023-06-12 ASSESSMENT — ENCOUNTER SYMPTOMS
HEADACHES: 0
COUGH: 0
CONSTIPATION: 0
HEMATOCHEZIA: 0
HEMATURIA: 0
SHORTNESS OF BREATH: 0
MYALGIAS: 0
PARESTHESIAS: 0
NERVOUS/ANXIOUS: 0
EYE PAIN: 0
PALPITATIONS: 0
NAUSEA: 1
ARTHRALGIAS: 0
ABDOMINAL PAIN: 1
FEVER: 0
FREQUENCY: 0
DIZZINESS: 0
DIARRHEA: 0
BREAST MASS: 0
CHILLS: 0
WEAKNESS: 0
HEARTBURN: 0
JOINT SWELLING: 0
SORE THROAT: 0
DYSURIA: 0

## 2023-06-12 NOTE — PROGRESS NOTES
SUBJECTIVE:   CC: Elle is an 28 year old who presents for preventive health visit.   Patient is new to the provider, is here to establish care, for annual physical and with other concerns as mentioned below  Chronic lower abdominal pain and abdominal bloating-complaining of ongoing intermittent episodes of moderate to severe lower abdominal colicky pain associated with sweating and chills when it happens lasting for few minutes to few hours, resolving on its own  Denies diarrhea, constipation, nausea, vomiting, UTI symptoms, history of recurrent UTI, hematuria, blood or mucus in the stool  Patient does notice recent concerns with some constipation along with rectal bleeding when she tries to have a BM  Denies family or personal history of IBS, IBD, celiac disease  Denies fever, chills decreased or lack of appetite, weight loss  Patient is sexually active, boyfriend had vasectomy  Denies abnormal vaginal bleeding, discharge  Patient does report low back pain with sexual intercourse   denies history of dysmenorrhea, endometriosis  Menstrual cycles are regular, LMP-5/30/2023  Denies heartburn, GERD  Past medical history significant for chronic migraines, ADHD  Acne rosacea-complaining of increasing redness on both cheeks, progressively getting worse  Duration of symptoms-several months  Has not tried any topical medications yet  Patient drinks alcohol every other night      6/12/2023     4:20 PM   Additional Questions   Roomed by jamie   Accompanied by self     Healthy Habits:    Getting at least 3 servings of Calcium per day:  Yes    Bi-annual eye exam:  Yes    Dental care twice a year:  Yes    Sleep apnea or symptoms of sleep apnea:  None    Diet:  Regular (no restrictions)    Frequency of exercise:  4-5 days/week    Duration of exercise:  15-30 minutes    Taking medications regularly:  Yes    Medication side effects:  None    PHQ-2 Total Score:    Additional concerns today:  Yes            Medication  Followup of Vyvanse for ADHD, taking it for the past 4 years  Had ADHD evaluation when she was very young    Taking Medication as prescribed: yes    Side Effects:  None    Medication Helping Symptoms:  yes        Abdominal/Flank Pain   Abdominal pain starting about a year ago     Social History     Tobacco Use     Smoking status: Never     Smokeless tobacco: Never   Vaping Use     Vaping status: Not on file   Substance Use Topics     Alcohol use: Yes     Comment: socially             6/12/2023     1:17 PM   Alcohol Use   Prescreen: >3 drinks/day or >7 drinks/week? No          View : No data to display.              Reviewed orders with patient.  Reviewed health maintenance and updated orders accordingly - Yes  Lab work is in process  Labs reviewed in EPIC  BP Readings from Last 3 Encounters:   06/12/23 132/81   04/21/23 136/87   02/14/23 118/85    Wt Readings from Last 3 Encounters:   06/12/23 60.8 kg (134 lb 1.6 oz)   04/21/23 59.8 kg (131 lb 14.4 oz)   02/14/23 59.4 kg (131 lb)                  Patient Active Problem List   Diagnosis     Severe episode of recurrent major depressive disorder, without psychotic features (H)     PABLO (generalized anxiety disorder)     Panic attack     Disorganized thinking     OCD (obsessive compulsive disorder)     Tension headache     Migraine with aura and without status migrainosus, not intractable     Chronic bilateral lower abdominal pain     Abdominal bloating     Acne rosacea     Attention deficit hyperactivity disorder (ADHD), combined type     Past Surgical History:   Procedure Laterality Date     ENT SURGERY      tonsillectomy; ear tubes     HEAD & NECK SURGERY      wisdom teeth extraction       Social History     Tobacco Use     Smoking status: Never     Smokeless tobacco: Never   Vaping Use     Vaping status: Not on file   Substance Use Topics     Alcohol use: Yes     Comment: socially     Family History   Problem Relation Age of Onset     Depression Mother      Anxiety  Disorder Mother      Mental Illness Mother         OCD     Attention Deficit Disorder Mother      Hypertension Mother      Substance Abuse Mother      Mental Illness Father      Anxiety Disorder Father      Depression Father      Attention Deficit Disorder Father      Substance Abuse Father      Alcoholism Maternal Grandmother      Substance Abuse Maternal Grandmother      Alcoholism Maternal Grandfather      Substance Abuse Maternal Grandfather      Substance Abuse Maternal Uncle      Schizophrenia Maternal Uncle      Substance Abuse Brother      Diabetes No family hx of      Coronary Artery Disease No family hx of      Hypertension No family hx of      Hyperlipidemia No family hx of      Cerebrovascular Disease No family hx of      Breast Cancer No family hx of      Colon Cancer No family hx of      Prostate Cancer No family hx of      Other Cancer No family hx of      Anesthesia Reaction No family hx of      Asthma No family hx of      Osteoporosis No family hx of      Genetic Disorder No family hx of      Thyroid Disease No family hx of      Obesity No family hx of      Unknown/Adopted No family hx of          Current Outpatient Medications   Medication Sig Dispense Refill     IBUPROFEN PO Take 400 mg by mouth        lisdexamfetamine (VYVANSE) 40 MG capsule Take 1 capsule (40 mg) by mouth daily for 30 days 30 capsule 0     loratadine (CLARITIN) 10 MG tablet Take 10 mg by mouth daily       metoclopramide (REGLAN) 10 MG tablet Take 1 tablet (10 mg) by mouth 3 times daily as needed (migraine or nausea) 30 tablet 1     metroNIDAZOLE (METROGEL) 0.75 % external gel Apply topically 2 times daily 45 g 1     SUMAtriptan (IMITREX) 50 MG tablet Take 1 tablet (50 mg) by mouth at onset of headache for migraine May repeat in 2 hours. Max 4 tablets/24 hours. 10 tablet 1     Allergies   Allergen Reactions     Latex Rash     Recent Labs   Lab Test 02/14/23  1338 11/19/20  1114 10/24/17  0353   LDL  --  112*  --    HDL  --  58   --    TRIG  --  45  --    CR  --   --  0.71   GFRESTIMATED  --   --  >90   GFRESTBLACK  --   --  >90   POTASSIUM  --   --  3.5   TSH 1.02  --   --         Breast Cancer Screenin/20/2022     9:36 PM   Breast CA Risk Assessment (FHS-7)   Do you have a family history of breast, colon, or ovarian cancer? No / Unknown       click delete button to remove this line now  Patient under 40 years of age: Routine Mammogram Screening not recommended.   Pertinent mammograms are reviewed under the imaging tab.    History of abnormal Pap smear: NO - age 21-29 PAP every 3 years recommended      2020    10:43 AM 10/19/2017     2:46 PM   PAP / HPV   PAP (Historical) NIL   NIL       Reviewed and updated as needed this visit by clinical staff   Tobacco  Allergies  Meds              Reviewed and updated as needed this visit by Provider                   Past Medical History:   Diagnosis Date     ADHD      Anxiety      Depressive disorder      OCD (obsessive compulsive disorder)      UTI (lower urinary tract infection)       Past Surgical History:   Procedure Laterality Date     ENT SURGERY      tonsillectomy; ear tubes     HEAD & NECK SURGERY      wisdom teeth extraction     OB History    Para Term  AB Living   0 0 0 0 0 0   SAB IAB Ectopic Multiple Live Births   0 0 0 0 0       Review of Systems   Constitutional: Negative for chills and fever.   HENT: Negative for congestion, ear pain, hearing loss and sore throat.    Eyes: Negative for pain and visual disturbance.   Respiratory: Negative for cough and shortness of breath.    Cardiovascular: Negative for chest pain, palpitations and peripheral edema.   Gastrointestinal: Positive for abdominal pain and nausea. Negative for constipation, diarrhea, heartburn and hematochezia.   Breasts:  Negative for tenderness, breast mass and discharge.   Genitourinary: Positive for pelvic pain. Negative for dysuria, frequency, genital sores, hematuria, urgency, vaginal  "bleeding and vaginal discharge.   Musculoskeletal: Negative for arthralgias, joint swelling and myalgias.   Skin: Negative for rash.   Neurological: Negative for dizziness, weakness, headaches and paresthesias.   Psychiatric/Behavioral: Negative for mood changes. The patient is not nervous/anxious.      CONSTITUTIONAL: NEGATIVE for fever, chills, change in weight  INTEGUMENTARY/SKIN: as above  EYES: NEGATIVE for vision changes or irritation  ENT: NEGATIVE for ear, mouth and throat problems  RESP: NEGATIVE for significant cough or SOB  BREAST: NEGATIVE for masses, tenderness or discharge  CV: NEGATIVE for chest pain, palpitations or peripheral edema  GI: as above  : NEGATIVE for unusual urinary or vaginal symptoms. Periods are regular.  MUSCULOSKELETAL: NEGATIVE for significant arthralgias or myalgia  NEURO: NEGATIVE for weakness, dizziness or paresthesias  ENDOCRINE: NEGATIVE for temperature intolerance, skin/hair changes  HEME/ALLERGY/IMMUNE: NEGATIVE for bleeding problems  PSYCHIATRIC: NEGATIVE for changes in mood or affect     OBJECTIVE:   /81 (BP Location: Right arm, Patient Position: Sitting, Cuff Size: Adult Regular)   Pulse 104   Temp 98.5  F (36.9  C) (Oral)   Resp 12   Ht 1.615 m (5' 3.58\")   Wt 60.8 kg (134 lb 1.6 oz)   LMP 05/30/2023 (Exact Date)   SpO2 99%   BMI 23.32 kg/m    Physical Exam  GENERAL: healthy, alert and no distress  EYES: Eyes grossly normal to inspection, PERRL and conjunctivae and sclerae normal  HENT: ear canals and TM's normal, nose and mouth without ulcers or lesions  NECK: no adenopathy, no asymmetry, masses, or scars and thyroid normal to palpation  RESP: lungs clear to auscultation - no rales, rhonchi or wheezes  BREAST: normal without masses, tenderness or nipple discharge and no palpable axillary masses or adenopathy  CV: regular rate and rhythm, normal S1 S2, no S3 or S4, no murmur, click or rub, no peripheral edema and peripheral pulses strong  ABDOMEN: soft, " moderate tenderness in the anterior lower abdomen with no guarding or rigidity, without hepatosplenomegaly or masses, bowel sounds normal    (female): normal female external genitalia, normal urethral meatus, vaginal mucosa pink, moist, well rugated, and normal cervix/adnexa/uterus without masses or discharge  MS: no gross musculoskeletal defects noted, no edema  SKIN: Moderate erythema with telangiectasia on both cheeks  NEURO: Normal strength and tone, mentation intact and speech normal  PSYCH: mentation appears normal, affect normal/bright    Diagnostic Test Results:  Labs reviewed in Epic    ASSESSMENT/PLAN:   (Z00.00) Routine general medical examination at a health care facility  (primary encounter diagnosis)  Comment:   Plan: Discussed on regular exercises, healthy eating, self breast exams monthly and routine dental checks.      (R10.31,  G89.29,  R10.32) Chronic bilateral lower abdominal pain  Comment:   Plan: CBC with platelets and differential,         Comprehensive metabolic panel (BMP + Alb, Alk         Phos, ALT, AST, Total. Bili, TP), Tissue         transglutaminase dahlia IgA and IgG, UA with         Microscopic reflex to Culture - lab collect, CT        Abdomen Pelvis w Contrast        Due to chronicity of symptoms, recommended labs and CT for further evaluationw  Will f/u on results and call with recommendations.  .    (R14.0) Abdominal bloating  Comment:   Plan: CBC with platelets and differential,         Comprehensive metabolic panel (BMP + Alb, Alk         Phos, ALT, AST, Total. Bili, TP), Tissue         transglutaminase dahlia IgA and IgG, UA with         Microscopic reflex to Culture - lab collect, CT        Abdomen Pelvis w Contrast        as above      (L71.9) Acne rosacea  Comment:   Plan: metroNIDAZOLE (METROGEL) 0.75 % external gel        Give education handouts on the same, avoid potential triggers including heat exposure and alcohol, caffeine  We will give a trial for metronidazole topical  gel twice daily follow for recheck in 3 months or sooner if needed      (G43.109) Migraine with aura and without status migrainosus, not intractable  Comment:   Plan: Patient uses Imitrex as needed  Patient indicates no refill request at this time, she has not used the medication in the past year continue to avoid potential triggers for migraine      (F90.2) Attention deficit hyperactivity disorder (ADHD), combined type  Comment:   Plan: Stable on current dose of Vyvanse 40 mg daily  Patient is not due for refills yet  Follow-up in 3 months for recheck or sooner if needed      (Z12.4) Cervical cancer screening  Comment:   Plan: Pap Screen reflex to HPV if ASCUS - recommend         age 25 - 29                    COUNSELING:  Reviewed preventive health counseling, as reflected in patient instructions  Special attention given to:        Regular exercise       Healthy diet/nutrition       Vision screening       Contraception        She reports that she has never smoked. She has never used smokeless tobacco.      Chart documentation done in part with Dragon Voice recognition Software. Although reviewed after completion, some word and grammatical error may remain.  Scott Cordova MD  [facility]

## 2023-06-13 LAB
ALBUMIN SERPL BCG-MCNC: 4.7 G/DL (ref 3.5–5.2)
ALP SERPL-CCNC: 62 U/L (ref 35–104)
ALT SERPL W P-5'-P-CCNC: 23 U/L (ref 0–50)
ANION GAP SERPL CALCULATED.3IONS-SCNC: 13 MMOL/L (ref 7–15)
AST SERPL W P-5'-P-CCNC: 23 U/L (ref 0–45)
BILIRUB SERPL-MCNC: 0.5 MG/DL
BUN SERPL-MCNC: 11.9 MG/DL (ref 6–20)
CALCIUM SERPL-MCNC: 9.5 MG/DL (ref 8.6–10)
CHLORIDE SERPL-SCNC: 104 MMOL/L (ref 98–107)
CREAT SERPL-MCNC: 0.97 MG/DL (ref 0.51–0.95)
DEPRECATED HCO3 PLAS-SCNC: 23 MMOL/L (ref 22–29)
GFR SERPL CREATININE-BSD FRML MDRD: 82 ML/MIN/1.73M2
GLUCOSE SERPL-MCNC: 88 MG/DL (ref 70–99)
POTASSIUM SERPL-SCNC: 4.2 MMOL/L (ref 3.4–5.3)
PROT SERPL-MCNC: 7.3 G/DL (ref 6.4–8.3)
SODIUM SERPL-SCNC: 140 MMOL/L (ref 136–145)

## 2023-06-14 LAB
TTG IGA SER-ACNC: <0.2 U/ML
TTG IGG SER-ACNC: <0.6 U/ML

## 2023-06-15 LAB
BKR LAB AP GYN ADEQUACY: NORMAL
BKR LAB AP GYN INTERPRETATION: NORMAL
BKR LAB AP HPV REFLEX: NORMAL
BKR LAB AP LMP: NORMAL
BKR LAB AP PREVIOUS ABNORMAL: NORMAL
PATH REPORT.COMMENTS IMP SPEC: NORMAL
PATH REPORT.COMMENTS IMP SPEC: NORMAL
PATH REPORT.RELEVANT HX SPEC: NORMAL

## 2023-06-15 NOTE — RESULT ENCOUNTER NOTE
Song Almeida,  Your recent labs showed all normal results including hemoglobin, lipids, thyroid and liver functions, urine with no concern for infection, negative screening test for celiac disease.  These are reassuring  Your kidney functions are all normal except creatinine slightly elevated at 11 in the setting of dehydration   We will need to wait for the imaging test results for further recommendations on your abdominal pain.   Let me know if you have any questions. Take care.  Scott Cordova MD

## 2023-09-20 ENCOUNTER — VIRTUAL VISIT (OUTPATIENT)
Dept: FAMILY MEDICINE | Facility: CLINIC | Age: 28
End: 2023-09-20
Payer: COMMERCIAL

## 2023-09-20 DIAGNOSIS — F33.2 SEVERE EPISODE OF RECURRENT MAJOR DEPRESSIVE DISORDER, WITHOUT PSYCHOTIC FEATURES (H): ICD-10-CM

## 2023-09-20 DIAGNOSIS — F90.2 ATTENTION DEFICIT HYPERACTIVITY DISORDER (ADHD), COMBINED TYPE: ICD-10-CM

## 2023-09-20 PROCEDURE — 99213 OFFICE O/P EST LOW 20 MIN: CPT | Mod: VID | Performed by: FAMILY MEDICINE

## 2023-09-20 RX ORDER — LISDEXAMFETAMINE DIMESYLATE 30 MG/1
30 CAPSULE ORAL DAILY
Qty: 30 CAPSULE | Refills: 0 | Status: SHIPPED | OUTPATIENT
Start: 2023-09-20 | End: 2023-10-20

## 2023-09-20 RX ORDER — LISDEXAMFETAMINE DIMESYLATE 30 MG/1
30 CAPSULE ORAL DAILY
Qty: 30 CAPSULE | Refills: 0 | Status: SHIPPED | OUTPATIENT
Start: 2023-11-21 | End: 2023-12-20 | Stop reason: DRUGHIGH

## 2023-09-20 RX ORDER — LISDEXAMFETAMINE DIMESYLATE 30 MG/1
30 CAPSULE ORAL DAILY
Qty: 30 CAPSULE | Refills: 0 | Status: SHIPPED | OUTPATIENT
Start: 2023-10-21 | End: 2023-11-20

## 2023-09-20 ASSESSMENT — ANXIETY QUESTIONNAIRES
2. NOT BEING ABLE TO STOP OR CONTROL WORRYING: NOT AT ALL
GAD7 TOTAL SCORE: 2
5. BEING SO RESTLESS THAT IT IS HARD TO SIT STILL: NOT AT ALL
6. BECOMING EASILY ANNOYED OR IRRITABLE: SEVERAL DAYS
GAD7 TOTAL SCORE: 2
IF YOU CHECKED OFF ANY PROBLEMS ON THIS QUESTIONNAIRE, HOW DIFFICULT HAVE THESE PROBLEMS MADE IT FOR YOU TO DO YOUR WORK, TAKE CARE OF THINGS AT HOME, OR GET ALONG WITH OTHER PEOPLE: NOT DIFFICULT AT ALL
3. WORRYING TOO MUCH ABOUT DIFFERENT THINGS: NOT AT ALL
7. FEELING AFRAID AS IF SOMETHING AWFUL MIGHT HAPPEN: NOT AT ALL
4. TROUBLE RELAXING: NOT AT ALL
1. FEELING NERVOUS, ANXIOUS, OR ON EDGE: SEVERAL DAYS

## 2023-09-20 ASSESSMENT — PATIENT HEALTH QUESTIONNAIRE - PHQ9
10. IF YOU CHECKED OFF ANY PROBLEMS, HOW DIFFICULT HAVE THESE PROBLEMS MADE IT FOR YOU TO DO YOUR WORK, TAKE CARE OF THINGS AT HOME, OR GET ALONG WITH OTHER PEOPLE: SOMEWHAT DIFFICULT
SUM OF ALL RESPONSES TO PHQ QUESTIONS 1-9: 4
SUM OF ALL RESPONSES TO PHQ QUESTIONS 1-9: 4

## 2023-09-20 NOTE — PROGRESS NOTES
Elle is a 28 year old who is being evaluated via a billable video visit.      How would you like to obtain your AVS? MyChart  If the video visit is dropped, the invitation should be resent by: Text to cell phone: [phone]  Will anyone else be joining your video visit? No          Assessment & Plan     Attention deficit hyperactivity disorder (ADHD), combined type  Needing improvement  We will increase the dose of Vyvanse from 20 mg to 30 mg daily, follow-up for recheck virtually in 3 months or sooner if needed.  Patient verbalised understanding and is agreeable to the plan.    - lisdexamfetamine (VYVANSE) 30 MG capsule; Take 1 capsule (30 mg) by mouth daily for 30 days  - lisdexamfetamine (VYVANSE) 30 MG capsule; Take 1 capsule (30 mg) by mouth daily for 30 days  - lisdexamfetamine (VYVANSE) 30 MG capsule; Take 1 capsule (30 mg) by mouth daily for 30 days  mfetamine (VYVANSE) 30 MG capsule             Severe episode of recurrent major depressive disorder, without psychotic features (H)  Comment:   Plan:       2/14/2023    10:08 AM 3/30/2023     8:53 AM 9/20/2023     4:58 PM   PHQ   PHQ-9 Total Score 6 5 4   Q9: Thoughts of better off dead/self-harm past 2 weeks Not at all Not at all Not at all     Patient denies current concerns for depression, continue to monitor closely           Chart documentation done in part with Dragon Voice recognition Software. Although reviewed after completion, some word and grammatical error may remain.    See Patient Instructions    Scott Cordova MD  [facility]   Elle is a 28 year old, presenting for the following health issues:  Recheck Medication    Patient is here for a video visit instead of in person visit due to the current COVID-19 pandemic.    History of Present Illness       Reason for visit:  Vyvanse med check    She eats 4 or more servings of fruits and vegetables daily.She consumes 0 sweetened beverage(s) daily.She exercises  with enough effort to increase her heart rate 20 to 29 minutes per day.  She exercises with enough effort to increase her heart rate 5 days per week.   She is taking medications regularly.         Medication Followup of Vyvanse  Taking Medication as prescribed: yes  Side Effects:  None  Medication Helping Symptoms: Patient feels a need to increase the dose since she still has difficulty finishing task and focus and attention  Was taking 30 mg in the past and she is interested going up on the dose        Review of Systems   CONSTITUTIONAL: NEGATIVE for fever, chills, change in weight  RESP: NEGATIVE for significant cough or SOB  CV: NEGATIVE for chest pain, palpitations or peripheral edema  PSYCHIATRIC: as above      Objective           Vitals:  No vitals were obtained today due to virtual visit.    Physical Exam   GENERAL: Healthy, alert and no distress  EYES: Eyes grossly normal to inspection  RESP: No audible wheeze, cough, or visible cyanosis.  No visible retractions or increased work of breathing.    NEURO: Cranial nerves grossly intact.  Mentation and speech appropriate for age.  PSYCH: Mentation appears normal, affect normal/bright, judgement and insight intact, normal speech and appearance well-groomed.                Video-Visit Details    Type of service:  Video Visit     Originating Location (pt. Location): Home    Distant Location (provider location):  Off-site  Platform used for Video Visit: [employer]

## 2023-09-27 ENCOUNTER — ANCILLARY PROCEDURE (OUTPATIENT)
Dept: CT IMAGING | Facility: CLINIC | Age: 28
End: 2023-09-27
Attending: FAMILY MEDICINE
Payer: COMMERCIAL

## 2023-09-27 DIAGNOSIS — G89.29 CHRONIC BILATERAL LOWER ABDOMINAL PAIN: ICD-10-CM

## 2023-09-27 DIAGNOSIS — R10.31 CHRONIC BILATERAL LOWER ABDOMINAL PAIN: ICD-10-CM

## 2023-09-27 DIAGNOSIS — R14.0 ABDOMINAL BLOATING: ICD-10-CM

## 2023-09-27 DIAGNOSIS — R10.32 CHRONIC BILATERAL LOWER ABDOMINAL PAIN: ICD-10-CM

## 2023-09-27 PROCEDURE — 74177 CT ABD & PELVIS W/CONTRAST: CPT | Mod: GC | Performed by: RADIOLOGY

## 2023-09-27 RX ORDER — IOPAMIDOL 755 MG/ML
74 INJECTION, SOLUTION INTRAVASCULAR ONCE
Status: COMPLETED | OUTPATIENT
Start: 2023-09-27 | End: 2023-09-27

## 2023-09-27 RX ADMIN — IOPAMIDOL 74 ML: 755 INJECTION, SOLUTION INTRAVASCULAR at 14:34

## 2023-09-27 NOTE — RESULT ENCOUNTER NOTE
Song Almeida,  Your CT showed no concerns that would explain your abdominal pain.  Your ongoing pain could be from constipation  I would recommend to start having a bowel regimen including taking MiraLAX daily for the next 1 week followed by every other day.  If abdominal pain does not resolve or improve in 3 to 4 weeks, I would recommend to follow-up for further evaluation.   Let me know if you have any questions. Take care.  Scott Cordova MD

## 2023-12-20 ENCOUNTER — VIRTUAL VISIT (OUTPATIENT)
Dept: FAMILY MEDICINE | Facility: CLINIC | Age: 28
End: 2023-12-20
Attending: FAMILY MEDICINE
Payer: COMMERCIAL

## 2023-12-20 DIAGNOSIS — F90.2 ATTENTION DEFICIT HYPERACTIVITY DISORDER (ADHD), COMBINED TYPE: Primary | ICD-10-CM

## 2023-12-20 PROCEDURE — 99213 OFFICE O/P EST LOW 20 MIN: CPT | Mod: VID | Performed by: FAMILY MEDICINE

## 2023-12-20 RX ORDER — LISDEXAMFETAMINE DIMESYLATE 40 MG/1
40 CAPSULE ORAL EVERY MORNING
Qty: 30 CAPSULE | Refills: 0 | Status: SHIPPED | OUTPATIENT
Start: 2023-12-20 | End: 2024-05-29

## 2023-12-20 NOTE — PROGRESS NOTES
"Elle is a 28 year old who is being evaluated via a billable video visit.      How would you like to obtain your AVS? MyChart  If the video visit is dropped, the invitation should be resent by: Text to cell phone: [phone]  Will anyone else be joining your video visit? No  {If patient encounters technical issues they should call [phone] :631011}        {PROVIDER CHARTING PREFERENCE:938982}    Subjective   Elle is a 28 year old, presenting for the following health issues:  No chief complaint on file.  {(!) Visit Details have not yet been documented.  Please enter Visit Details and then use this list to pull in documentation. (Optional):691901}    History of Present Illness       Reason for visit:  Med check for vyvanse    She eats 4 or more servings of fruits and vegetables daily.She consumes 0 sweetened beverage(s) daily.She exercises with enough effort to increase her heart rate 20 to 29 minutes per day.  She exercises with enough effort to increase her heart rate 4 days per week.   She is taking medications regularly.       {SUPERLIST (Optional):274705}  {additonal problems for provider to add (Optional):796066}      Review of Systems   {ROS COMP (Optional):255818}      Objective           Vitals:  No vitals were obtained today due to virtual visit.    Physical Exam   {video visit exam brief selected:559403}    {Diagnostic Test Results (Optional):428265}    {AMBULATORY ATTESTATION (Optional):525710}        Video-Visit Details    Type of service:  Video Visit     Originating Location (pt. Location): {video visit patient location:956370::\"Home\"}  {PROVIDER LOCATION On-site should be selected for visits conducted from your clinic location or adjoining [facility] hospital, academic office, or other nearby [facility] building. Off-site should be selected for all other provider locations, including home:992626}  Distant Location (provider location):  {virtual location provider:040391}  Platform used for Video Visit: " "{Virtual Visit Platforms:379088::\"Cruz\"}      "

## 2023-12-20 NOTE — PROGRESS NOTES
Elle is a 28 year old who is being evaluated via a billable video visit.      How would you like to obtain your AVS? MyChart  If the video visit is dropped, the invitation should be resent by: Text to cell phone: [phone]  Will anyone else be joining your video visit? No          Assessment & Plan     Attention deficit hyperactivity disorder (ADHD), combined type  Needing improvement  Will increase the dose from 30 mg to 40 mg daily, follow for recheck in 5 to 6 weeks or sooner if needed  Patient verbalised understanding and is agreeable to the plan.  Continue with healthy eating, sleep hygiene, start eating with a good vitamin D 2000 units daily  - lisdexamfetamine (VYVANSE) 40 MG capsule; Take 1 capsule (40 mg) by mouth every morning Dose change             Chart documentation done in part with Dragon Voice recognition Software. Although reviewed after completion, some word and grammatical error may remain.    See Patient Instructions    Scott Cordova MD  [facility]   Elle is a 28 year old, presenting for the following health issues:  A.D.H.D      History of Present Illness       Reason for visit:  Med check for vyvanse    She eats 4 or more servings of fruits and vegetables daily.She consumes 0 sweetened beverage(s) daily.She exercises with enough effort to increase her heart rate 20 to 29 minutes per day.  She exercises with enough effort to increase her heart rate 4 days per week.   She is taking medications regularly.       Medication Followup of Vyvanse  Taking Medication as prescribed: yes  Side Effects:  None  Medication Helping Symptoms: Needing improvement        Review of Systems   CONSTITUTIONAL: NEGATIVE for fever, chills, change in weight  RESP: NEGATIVE for significant cough or SOB  CV: NEGATIVE for chest pain, palpitations or peripheral edema  PSYCHIATRIC: History of ADHD      Objective           Vitals:  No vitals were obtained today due to virtual  visit.    Physical Exam   GENERAL: Healthy, alert and no distress  EYES: Eyes grossly normal to inspection  RESP: No audible wheeze, cough, or visible cyanosis.  No visible retractions or increased work of breathing.    NEURO: Cranial nerves grossly intact.  Mentation and speech appropriate for age.  PSYCH: Mentation appears normal, affect normal/bright, judgement and insight intact, normal speech and appearance well-groomed.                Video-Visit Details    Type of service:  Video Visit     Originating Location (pt. Location): Home    Distant Location (provider location):  Off-site  Platform used for Video Visit: Cruz

## 2024-01-22 ENCOUNTER — VIRTUAL VISIT (OUTPATIENT)
Dept: FAMILY MEDICINE | Facility: CLINIC | Age: 29
End: 2024-01-22
Attending: FAMILY MEDICINE
Payer: COMMERCIAL

## 2024-01-22 DIAGNOSIS — F90.2 ATTENTION DEFICIT HYPERACTIVITY DISORDER (ADHD), COMBINED TYPE: ICD-10-CM

## 2024-01-22 PROCEDURE — 99213 OFFICE O/P EST LOW 20 MIN: CPT | Mod: 95 | Performed by: FAMILY MEDICINE

## 2024-01-22 RX ORDER — LISDEXAMFETAMINE DIMESYLATE 40 MG/1
40 CAPSULE ORAL DAILY
Qty: 30 CAPSULE | Refills: 0 | Status: SHIPPED | OUTPATIENT
Start: 2024-02-22 | End: 2024-03-31

## 2024-01-22 RX ORDER — LISDEXAMFETAMINE DIMESYLATE 40 MG/1
40 CAPSULE ORAL EVERY MORNING
Qty: 30 CAPSULE | Refills: 0 | Status: CANCELLED | OUTPATIENT
Start: 2024-01-22

## 2024-01-22 RX ORDER — LISDEXAMFETAMINE DIMESYLATE 40 MG/1
40 CAPSULE ORAL DAILY
Qty: 30 CAPSULE | Refills: 0 | Status: SHIPPED | OUTPATIENT
Start: 2024-03-24 | End: 2024-04-23

## 2024-01-22 RX ORDER — LISDEXAMFETAMINE DIMESYLATE 40 MG/1
40 CAPSULE ORAL DAILY
Qty: 30 CAPSULE | Refills: 0 | Status: SHIPPED | OUTPATIENT
Start: 2024-01-22 | End: 2024-02-21

## 2024-01-22 NOTE — PROGRESS NOTES
"    Instructions Relayed to Patient by Virtual Roomer:     Patient is active on [employer]:   Relayed following to patient: \"It looks like you are active on [employer], are you able to join the visit this way? If not, do you need us to send you a link now or would you like your provider to send a link via text or email when they are ready to initiate the visit?\"    Reminded patient to ensure they were logged on to virtual visit by arrival time listed. Documented in appointment notes if patient had flexibility to initiate visit sooner than arrival time. If pediatric virtual visit, ensured pediatric patient along with parent/guardian will be present for video visit.     Patient offered the website [email or web address] and/or phone number to [employer] Help line: [phone]    Elle is a 28 year old who is being evaluated via a billable video visit.      How would you like to obtain your AVS? PeerIndex  If the video visit is dropped, the invitation should be resent by: Text to cell phone: [phone]  Will anyone else be joining your video visit? No          Assessment & Plan     Attention deficit hyperactivity disorder (ADHD), combined type  Improved and stable  Continue current dose of Vyvanse, refill sent today, follow for recheck in June at the time of physical or sooner if needed  - lisdexamfetamine (VYVANSE) 40 MG capsule; Take 1 capsule (40 mg) by mouth daily for 30 days  - lisdexamfetamine (VYVANSE) 40 MG capsule; Take 1 capsule (40 mg) by mouth daily for 30 days  - lisdexamfetamine (VYVANSE) 40 MG capsule; Take 1 capsule (40 mg) by mouth daily for 30 days            Chart documentation done in part with Dragon Voice recognition Software. Although reviewed after completion, some word and grammatical error may remain.    See Patient Instructions    Subjective   Elle is a 28 year old, presenting for the following health issues:  Recheck Medication (Vyvanse)  Patient is here for a video visit instead of " in person visit due to the current COVID-19 pandemic.        1/22/2024     7:05 AM   Additional Questions   Roomed by Louis KYLE   Accompanied by Self     History of Present Illness       Reason for visit:  Med Check    She eats 4 or more servings of fruits and vegetables daily.She consumes 0 sweetened beverage(s) daily.She exercises with enough effort to increase her heart rate 30 to 60 minutes per day.  She exercises with enough effort to increase her heart rate 5 days per week.   She is taking medications regularly.       Medication Followup of Vyvanse  Patient is here for follow-up on Vyvanse after increasing the dose at the last visit few weeks ago  Taking Medication as prescribed: yes  Side Effects:  None  Medication Helping Symptoms:  yes        Review of Systems  CONSTITUTIONAL: NEGATIVE for fever, chills, change in weight  RESP: NEGATIVE for significant cough or SOB  CV: NEGATIVE for chest pain, palpitations or peripheral edema  GI: NEGATIVE for nausea, abdominal pain, heartburn, or change in bowel habits  MUSCULOSKELETAL: NEGATIVE for significant arthralgias or myalgia  NEURO: NEGATIVE for weakness, dizziness or paresthesias  ENDOCRINE: NEGATIVE for temperature intolerance, skin/hair changes  HEME/ALLERGY/IMMUNE: NEGATIVE for bleeding problems  PSYCHIATRIC: History of ADHD      Objective           Vitals:  No vitals were obtained today due to virtual visit.    Physical Exam   GENERAL: alert and no distress  EYES: Eyes grossly normal to inspection  RESP: No audible wheeze, cough, or visible cyanosis.    NEURO: Cranial nerves grossly intact.  Mentation and speech appropriate for age.  PSYCH: Appropriate affect, tone, and pace of words          Video-Visit Details    Type of service:  Video Visit     Originating Location (pt. Location): Home    Distant Location (provider location):  Off-site  Platform used for Video Visit: Cruz  Signed Electronically by: Scott Cordova MD  .

## 2024-02-12 ENCOUNTER — DOCUMENTATION ONLY (OUTPATIENT)
Dept: OTHER | Facility: CLINIC | Age: 29
End: 2024-02-12
Payer: COMMERCIAL

## 2024-05-29 ENCOUNTER — VIRTUAL VISIT (OUTPATIENT)
Dept: FAMILY MEDICINE | Facility: CLINIC | Age: 29
End: 2024-05-29
Payer: COMMERCIAL

## 2024-05-29 DIAGNOSIS — F90.2 ATTENTION DEFICIT HYPERACTIVITY DISORDER (ADHD), COMBINED TYPE: Primary | ICD-10-CM

## 2024-05-29 PROCEDURE — 96127 BRIEF EMOTIONAL/BEHAV ASSMT: CPT | Mod: 95 | Performed by: FAMILY MEDICINE

## 2024-05-29 PROCEDURE — G2211 COMPLEX E/M VISIT ADD ON: HCPCS | Mod: 95 | Performed by: FAMILY MEDICINE

## 2024-05-29 PROCEDURE — 99213 OFFICE O/P EST LOW 20 MIN: CPT | Mod: 95 | Performed by: FAMILY MEDICINE

## 2024-05-29 RX ORDER — LISDEXAMFETAMINE DIMESYLATE 40 MG/1
40 CAPSULE ORAL DAILY
Qty: 30 CAPSULE | Refills: 0 | Status: SHIPPED | OUTPATIENT
Start: 2024-07-28 | End: 2024-08-27

## 2024-05-29 RX ORDER — LISDEXAMFETAMINE DIMESYLATE 40 MG/1
40 CAPSULE ORAL DAILY
Qty: 30 CAPSULE | Refills: 0 | Status: SHIPPED | OUTPATIENT
Start: 2024-06-28 | End: 2024-07-28

## 2024-05-29 RX ORDER — LISDEXAMFETAMINE DIMESYLATE 40 MG/1
40 CAPSULE ORAL DAILY
Qty: 30 CAPSULE | Refills: 0 | Status: SHIPPED | OUTPATIENT
Start: 2024-05-29 | End: 2024-06-28

## 2024-05-29 ASSESSMENT — ANXIETY QUESTIONNAIRES
4. TROUBLE RELAXING: NOT AT ALL
2. NOT BEING ABLE TO STOP OR CONTROL WORRYING: NOT AT ALL
1. FEELING NERVOUS, ANXIOUS, OR ON EDGE: NOT AT ALL
GAD7 TOTAL SCORE: 0
IF YOU CHECKED OFF ANY PROBLEMS ON THIS QUESTIONNAIRE, HOW DIFFICULT HAVE THESE PROBLEMS MADE IT FOR YOU TO DO YOUR WORK, TAKE CARE OF THINGS AT HOME, OR GET ALONG WITH OTHER PEOPLE: NOT DIFFICULT AT ALL
5. BEING SO RESTLESS THAT IT IS HARD TO SIT STILL: NOT AT ALL
6. BECOMING EASILY ANNOYED OR IRRITABLE: NOT AT ALL
7. FEELING AFRAID AS IF SOMETHING AWFUL MIGHT HAPPEN: NOT AT ALL
8. IF YOU CHECKED OFF ANY PROBLEMS, HOW DIFFICULT HAVE THESE MADE IT FOR YOU TO DO YOUR WORK, TAKE CARE OF THINGS AT HOME, OR GET ALONG WITH OTHER PEOPLE?: NOT DIFFICULT AT ALL
3. WORRYING TOO MUCH ABOUT DIFFERENT THINGS: NOT AT ALL
GAD7 TOTAL SCORE: 0
7. FEELING AFRAID AS IF SOMETHING AWFUL MIGHT HAPPEN: NOT AT ALL
GAD7 TOTAL SCORE: 0

## 2024-05-29 NOTE — PROGRESS NOTES
Elle is a 29 year old who is being evaluated via a billable video visit.    How would you like to obtain your AVS? MyChart  If the video visit is dropped, the invitation should be resent by: Text to cell phone: [phone]  Will anyone else be joining your video visit? No      Assessment & Plan     Attention deficit hyperactivity disorder (ADHD), combined type  Stable, continue current dose of Vyvanse, follow-up for recheck in 3 months at the time of physical or sooner if needed.  Refills were sent today.  - lisdexamfetamine (VYVANSE) 40 MG capsule; Take 1 capsule (40 mg) by mouth daily for 30 days  - lisdexamfetamine (VYVANSE) 40 MG capsule; Take 1 capsule (40 mg) by mouth daily for 30 days  - lisdexamfetamine (VYVANSE) 40 MG capsule; Take 1 capsule (40 mg) by mouth daily for 30 days            Chart documentation done in part with Dragon Voice recognition Software. Although reviewed after completion, some word and grammatical error may remain.    See Patient Instructions    Subjective   Elle is a 29 year old, presenting for the following health issues:  Recheck Medication        5/29/2024    11:28 AM   Additional Questions   Roomed by osmel   Accompanied by self     History of Present Illness       Reason for visit:  Med check        Medication Followup of lisdexamfetamine (VYVANSE) 40 MG capsule   Taking Medication as prescribed: yes  Side Effects:  None  Medication Helping Symptoms:  yes        Review of Systems  CONSTITUTIONAL: NEGATIVE for fever, chills, change in weight  PSYCHIATRIC: NEGATIVE for changes in mood or affect and h/o ADHD      Objective           Vitals:  No vitals were obtained today due to virtual visit.    Physical Exam   GENERAL: alert and no distress  EYES: Eyes grossly normal to inspection  RESP: No audible wheeze, cough, or visible cyanosis.    NEURO: Cranial nerves grossly intact.  Mentation and speech appropriate for age.  PSYCH: Appropriate affect, tone, and pace of words           Video-Visit Details    Type of service:  Video Visit   Originating Location (pt. Location): Home    Distant Location (provider location):  Off-site  Platform used for Video Visit: Cruz  Signed Electronically by: Scott Cordova MD

## 2024-08-13 SDOH — HEALTH STABILITY: PHYSICAL HEALTH: ON AVERAGE, HOW MANY MINUTES DO YOU ENGAGE IN EXERCISE AT THIS LEVEL?: 20 MIN

## 2024-08-13 SDOH — HEALTH STABILITY: PHYSICAL HEALTH: ON AVERAGE, HOW MANY DAYS PER WEEK DO YOU ENGAGE IN MODERATE TO STRENUOUS EXERCISE (LIKE A BRISK WALK)?: 5 DAYS

## 2024-08-13 ASSESSMENT — SOCIAL DETERMINANTS OF HEALTH (SDOH): HOW OFTEN DO YOU GET TOGETHER WITH FRIENDS OR RELATIVES?: ONCE A WEEK

## 2024-08-14 ENCOUNTER — OFFICE VISIT (OUTPATIENT)
Dept: FAMILY MEDICINE | Facility: CLINIC | Age: 29
End: 2024-08-14
Payer: COMMERCIAL

## 2024-08-14 VITALS
SYSTOLIC BLOOD PRESSURE: 114 MMHG | RESPIRATION RATE: 12 BRPM | OXYGEN SATURATION: 100 % | DIASTOLIC BLOOD PRESSURE: 74 MMHG | BODY MASS INDEX: 23.67 KG/M2 | HEIGHT: 63 IN | HEART RATE: 59 BPM | TEMPERATURE: 97.9 F | WEIGHT: 133.6 LBS

## 2024-08-14 DIAGNOSIS — Z00.00 ROUTINE GENERAL MEDICAL EXAMINATION AT A HEALTH CARE FACILITY: Primary | ICD-10-CM

## 2024-08-14 DIAGNOSIS — F90.2 ATTENTION DEFICIT HYPERACTIVITY DISORDER (ADHD), COMBINED TYPE: ICD-10-CM

## 2024-08-14 PROBLEM — F33.2 SEVERE EPISODE OF RECURRENT MAJOR DEPRESSIVE DISORDER, WITHOUT PSYCHOTIC FEATURES (H): Status: RESOLVED | Noted: 2017-10-19 | Resolved: 2024-08-14

## 2024-08-14 PROCEDURE — 99213 OFFICE O/P EST LOW 20 MIN: CPT | Mod: 25 | Performed by: FAMILY MEDICINE

## 2024-08-14 PROCEDURE — 99395 PREV VISIT EST AGE 18-39: CPT | Performed by: FAMILY MEDICINE

## 2024-08-14 RX ORDER — LISDEXAMFETAMINE DIMESYLATE 40 MG/1
40 CAPSULE ORAL DAILY
Qty: 30 CAPSULE | Refills: 0 | Status: SHIPPED | OUTPATIENT
Start: 2024-10-13 | End: 2024-11-12

## 2024-08-14 RX ORDER — LISDEXAMFETAMINE DIMESYLATE 40 MG/1
40 CAPSULE ORAL DAILY
Qty: 30 CAPSULE | Refills: 0 | Status: SHIPPED | OUTPATIENT
Start: 2024-09-13 | End: 2024-10-13

## 2024-08-14 RX ORDER — LISDEXAMFETAMINE DIMESYLATE 40 MG/1
40 CAPSULE ORAL DAILY
Qty: 30 CAPSULE | Refills: 0 | Status: SHIPPED | OUTPATIENT
Start: 2024-08-14 | End: 2024-09-13

## 2024-08-14 ASSESSMENT — ANXIETY QUESTIONNAIRES
4. TROUBLE RELAXING: SEVERAL DAYS
3. WORRYING TOO MUCH ABOUT DIFFERENT THINGS: NOT AT ALL
5. BEING SO RESTLESS THAT IT IS HARD TO SIT STILL: NOT AT ALL
1. FEELING NERVOUS, ANXIOUS, OR ON EDGE: NOT AT ALL
7. FEELING AFRAID AS IF SOMETHING AWFUL MIGHT HAPPEN: NOT AT ALL
GAD7 TOTAL SCORE: 2
GAD7 TOTAL SCORE: 2
6. BECOMING EASILY ANNOYED OR IRRITABLE: SEVERAL DAYS
7. FEELING AFRAID AS IF SOMETHING AWFUL MIGHT HAPPEN: NOT AT ALL
IF YOU CHECKED OFF ANY PROBLEMS ON THIS QUESTIONNAIRE, HOW DIFFICULT HAVE THESE PROBLEMS MADE IT FOR YOU TO DO YOUR WORK, TAKE CARE OF THINGS AT HOME, OR GET ALONG WITH OTHER PEOPLE: NOT DIFFICULT AT ALL
8. IF YOU CHECKED OFF ANY PROBLEMS, HOW DIFFICULT HAVE THESE MADE IT FOR YOU TO DO YOUR WORK, TAKE CARE OF THINGS AT HOME, OR GET ALONG WITH OTHER PEOPLE?: NOT DIFFICULT AT ALL
2. NOT BEING ABLE TO STOP OR CONTROL WORRYING: NOT AT ALL

## 2024-08-14 ASSESSMENT — PAIN SCALES - GENERAL: PAINLEVEL: NO PAIN (0)

## 2024-08-14 NOTE — PATIENT INSTRUCTIONS
Patient Education   Preventive Care Advice   This is general advice given by our system to help you stay healthy. However, your care team may have specific advice just for you. Please talk to your care team about your preventive care needs.  Nutrition  Eat 5 or more servings of fruits and vegetables each day.  Try wheat bread, brown rice and whole grain pasta (instead of white bread, rice, and pasta).  Get enough calcium and vitamin D. Check the label on foods and aim for 100% of the RDA (recommended daily allowance).  Lifestyle  Exercise at least 150 minutes each week  (30 minutes a day, 5 days a week).  Do muscle strengthening activities 2 days a week. These help control your weight and prevent disease.  No smoking.  Wear sunscreen to prevent skin cancer.  Have a dental exam and cleaning every 6 months.  Yearly exams  See your health care team every year to talk about:  Any changes in your health.  Any medicines your care team has prescribed.  Preventive care, family planning, and ways to prevent chronic diseases.  Shots (vaccines)   HPV shots (up to age 26), if you've never had them before.  Hepatitis B shots (up to age 59), if you've never had them before.  COVID-19 shot: Get this shot when it's due.  Flu shot: Get a flu shot every year.  Tetanus shot: Get a tetanus shot every 10 years.  Pneumococcal, hepatitis A, and RSV shots: Ask your care team if you need these based on your risk.  Shingles shot (for age 50 and up)  General health tests  Diabetes screening:  Starting at age 35, Get screened for diabetes at least every 3 years.  If you are younger than age 35, ask your care team if you should be screened for diabetes.  Cholesterol test: At age 39, start having a cholesterol test every 5 years, or more often if advised.  Bone density scan (DEXA): At age 50, ask your care team if you should have this scan for osteoporosis (brittle bones).  Hepatitis C: Get tested at least once in your life.  STIs (sexually  transmitted infections)  Before age 24: Ask your care team if you should be screened for STIs.  After age 24: Get screened for STIs if you're at risk. You are at risk for STIs (including HIV) if:  You are sexually active with more than one person.  You don't use condoms every time.  You or a partner was diagnosed with a sexually transmitted infection.  If you are at risk for HIV, ask about PrEP medicine to prevent HIV.  Get tested for HIV at least once in your life, whether you are at risk for HIV or not.  Cancer screening tests  Cervical cancer screening: If you have a cervix, begin getting regular cervical cancer screening tests starting at age 21.  Breast cancer scan (mammogram): If you've ever had breasts, begin having regular mammograms starting at age 40. This is a scan to check for breast cancer.  Colon cancer screening: It is important to start screening for colon cancer at age 45.  Have a colonoscopy test every 10 years (or more often if you're at risk) Or, ask your provider about stool tests like a FIT test every year or Cologuard test every 3 years.  To learn more about your testing options, visit:   .  For help making a decision, visit:   https://bit.ly/ai96679.  Prostate cancer screening test: If you have a prostate, ask your care team if a prostate cancer screening test (PSA) at age 55 is right for you.  Lung cancer screening: If you are a current or former smoker ages 50 to 80, ask your care team if ongoing lung cancer screenings are right for you.  For informational purposes only. Not to replace the advice of your health care provider. Copyright   2023 [employer]. All rights reserved. Clinically reviewed by the [facility] Transitions Program. [employer] 403663 - REV 01/24.

## 2024-08-14 NOTE — PROGRESS NOTES
Preventive Care Visit  [facility]  Scott Cordova MD, Family Medicine  Aug 14, 2024      Assessment & Plan     Routine general medical examination at a health care facility  Discussed on regular exercises, healthy eating, self breast exams monthly and routine dental checks.    Attention deficit hyperactivity disorder (ADHD), combined type  Stable, continue with current dose of Vyvanse 40 mg daily, follow-up for recheck virtually in 3 months or sooner if needed.  - lisdexamfetamine (VYVANSE) 40 MG capsule; Take 1 capsule (40 mg) by mouth daily for 30 days  - lisdexamfetamine (VYVANSE) 40 MG capsule; Take 1 capsule (40 mg) by mouth daily for 30 days  - lisdexamfetamine (VYVANSE) 40 MG capsule; Take 1 capsule (40 mg) by mouth daily for 30 days            Counseling  Appropriate preventive services were addressed with this patient via screening, questionnaire, or discussion as appropriate for fall prevention, nutrition, physical activity, Tobacco-use cessation, weight loss and cognition.  Checklist reviewing preventive services available has been given to the patient.  Reviewed patient's diet, addressing concerns and/or questions.   She is at risk for psychosocial distress and has been provided with information to reduce risk.       Chart documentation done in part with Dragon Voice recognition Software. Although reviewed after completion, some word and grammatical error may remain.    See Patient Instructions    Emelyn Almeida is a 29 year old, presenting for the following:  Physical        8/14/2024     7:17 AM   Additional Questions   Roomed by Yvette BARBER   Accompanied by self        Health Care Directive  Patient does not have a Health Care Directive or Living Will: Discussed advance care planning with patient; information given to patient to review.    Healthy Habits:     Getting at least 3 servings of Calcium per day:  Yes    Bi-annual eye exam:  Yes    Dental care twice a year:  Yes     Sleep apnea or symptoms of sleep apnea:  None    Diet:  Regular (no restrictions)    Frequency of exercise:  4-5 days/week    Duration of exercise:  15-30 minutes    Taking medications regularly:  Yes    Barriers to taking medications:  None    Medication side effects:  None    Additional concerns today:  No        Medication Followup of vyvanse  Taking Medication as prescribed: yes  Side Effects:  None  Medication Helping Symptoms:  yes        8/13/2024   General Health   How would you rate your overall physical health? Good   Feel stress (tense, anxious, or unable to sleep) Only a little      (!) STRESS CONCERN      8/13/2024   Nutrition   Three or more servings of calcium each day? Yes   Diet: Regular (no restrictions)   How many servings of fruit and vegetables per day? 4 or more   How many sweetened beverages each day? 0-1            8/13/2024   Exercise   Days per week of moderate/strenous exercise 5 days   Average minutes spent exercising at this level 20 min            8/13/2024   Social Factors   Frequency of gathering with friends or relatives Once a week   Worry food won't last until get money to buy more No   Food not last or not have enough money for food? No   Do you have housing? (Housing is defined as stable permanent housing and does not include staying ouside in a car, in a tent, in an abandoned building, in an overnight shelter, or couch-surfing.) No   Are you worried about losing your housing? No   Lack of transportation? No   Unable to get utilities (heat,electricity)? No   Want help with housing or utility concern? No      (!) HOUSING CONCERN PRESENT      8/13/2024   Dental   Dentist two times every year? Yes            8/13/2024   TB Screening   Were you born outside of the [place]? No          Today's PHQ-9 Score:       8/13/2024    11:55 PM   PHQ-9 SCORE   PHQ-9 Total Score MyChart 1 (Minimal depression)   PHQ-9 Total Score 1         8/13/2024   Substance Use   Alcohol more than 3/day or more  than 7/wk No   Do you use any other substances recreationally? No        Social History     Tobacco Use    Smoking status: Never    Smokeless tobacco: Never   Substance Use Topics    Alcohol use: Yes     Comment: socially    Drug use: No          Mammogram Screening - Patient under 40 years of age: Routine Mammogram Screening not recommended.         2024   STI Screening   New sexual partner(s) since last STI/HIV test? No        History of abnormal Pap smear: No - age 21-29 PAP every 3 years recommended        2023     4:29 PM 2020    10:43 AM 10/19/2017     2:46 PM   PAP / HPV   PAP Negative for Intraepithelial Lesion or Malignancy (NILM)      PAP (Historical)  NIL  NIL            2024   Contraception/Family Planning   Questions about contraception or family planning No           Reviewed and updated as needed this visit by Provider                    Past Medical History:   Diagnosis Date    ADHD     Anxiety     Depressive disorder     OCD (obsessive compulsive disorder)     UTI (lower urinary tract infection)      Past Surgical History:   Procedure Laterality Date    ENT SURGERY      tonsillectomy; ear tubes    HEAD & NECK SURGERY      wisdom teeth extraction     OB History    Para Term  AB Living   0 0 0 0 0 0   SAB IAB Ectopic Multiple Live Births   0 0 0 0 0     Lab work is in process  Labs reviewed in EPIC  BP Readings from Last 3 Encounters:   24 114/74   23 132/81   23 136/87    Wt Readings from Last 3 Encounters:   24 60.6 kg (133 lb 9.6 oz)   23 60.8 kg (134 lb 1.6 oz)   23 59.8 kg (131 lb 14.4 oz)                  Patient Active Problem List   Diagnosis    PABLO (generalized anxiety disorder)    Panic attack    Disorganized thinking    OCD (obsessive compulsive disorder)    Tension headache    Migraine with aura and without status migrainosus, not intractable    Chronic bilateral lower abdominal pain    Abdominal bloating    Acne  rosacea    Attention deficit hyperactivity disorder (ADHD), combined type     Past Surgical History:   Procedure Laterality Date    ENT SURGERY      tonsillectomy; ear tubes    HEAD & NECK SURGERY      wisdom teeth extraction       Social History     Tobacco Use    Smoking status: Never    Smokeless tobacco: Never   Substance Use Topics    Alcohol use: Yes     Comment: socially     Family History   Problem Relation Age of Onset    Depression Mother     Anxiety Disorder Mother     Mental Illness Mother         OCD    Attention Deficit Disorder Mother     Hypertension Mother     Substance Abuse Mother     Mental Illness Father     Anxiety Disorder Father     Depression Father     Attention Deficit Disorder Father     Substance Abuse Father     Alcoholism Maternal Grandmother     Substance Abuse Maternal Grandmother     Alcoholism Maternal Grandfather     Substance Abuse Maternal Grandfather     Substance Abuse Maternal Uncle     Schizophrenia Maternal Uncle     Substance Abuse Brother     Diabetes No family hx of     Coronary Artery Disease No family hx of     Hypertension No family hx of     Hyperlipidemia No family hx of     Cerebrovascular Disease No family hx of     Breast Cancer No family hx of     Colon Cancer No family hx of     Prostate Cancer No family hx of     Other Cancer No family hx of     Anesthesia Reaction No family hx of     Asthma No family hx of     Osteoporosis No family hx of     Genetic Disorder No family hx of     Thyroid Disease No family hx of     Obesity No family hx of     Unknown/Adopted No family hx of          Current Outpatient Medications   Medication Sig Dispense Refill    IBUPROFEN PO Take 400 mg by mouth       lisdexamfetamine (VYVANSE) 40 MG capsule Take 1 capsule (40 mg) by mouth daily for 30 days 30 capsule 0    [START ON 9/13/2024] lisdexamfetamine (VYVANSE) 40 MG capsule Take 1 capsule (40 mg) by mouth daily for 30 days 30 capsule 0    [START ON 10/13/2024] lisdexamfetamine  "(VYVANSE) 40 MG capsule Take 1 capsule (40 mg) by mouth daily for 30 days 30 capsule 0    lisdexamfetamine (VYVANSE) 40 MG capsule Take 1 capsule (40 mg) by mouth daily for 30 days 30 capsule 0    loratadine (CLARITIN) 10 MG tablet Take 10 mg by mouth daily      metroNIDAZOLE (METROGEL) 0.75 % external gel Apply topically 2 times daily 45 g 1     Allergies   Allergen Reactions    Latex Rash     Recent Labs   Lab Test 06/12/23  1700 02/14/23  1338 11/19/20  1114 10/24/17  0353   LDL  --   --  112*  --    HDL  --   --  58  --    TRIG  --   --  45  --    ALT 23  --   --   --    CR 0.97*  --   --  0.71   GFRESTIMATED 82  --   --  >90   GFRESTBLACK  --   --   --  >90   POTASSIUM 4.2  --   --  3.5   TSH  --  1.02  --   --           Review of Systems  CONSTITUTIONAL: NEGATIVE for fever, chills, change in weight  INTEGUMENTARY/SKIN: NEGATIVE for worrisome rashes, moles or lesions  EYES: NEGATIVE for vision changes or irritation  ENT/MOUTH: NEGATIVE for ear, mouth and throat problems  RESP: NEGATIVE for significant cough or SOB  BREAST: NEGATIVE for masses, tenderness or discharge  CV: NEGATIVE for chest pain, palpitations or peripheral edema  GI: NEGATIVE for nausea, abdominal pain, heartburn, or change in bowel habits  : NEGATIVE for frequency, dysuria, or hematuria   female: Status post Mirena IUD  MUSCULOSKELETAL: NEGATIVE for significant arthralgias or myalgia  NEURO: NEGATIVE for weakness, dizziness or paresthesias  ENDOCRINE: NEGATIVE for temperature intolerance, skin/hair changes  HEME: NEGATIVE for bleeding problems  PSYCHIATRIC: History of ADHD     Objective    Exam  /74 (BP Location: Right arm, Patient Position: Sitting, Cuff Size: Adult Regular)   Pulse 59   Temp 97.9  F (36.6  C) (Oral)   Resp 12   Ht 1.6 m (5' 3\")   Wt 60.6 kg (133 lb 9.6 oz)   LMP 07/20/2024   SpO2 100%   BMI 23.67 kg/m     Estimated body mass index is 23.67 kg/m  as calculated from the following:    Height as of this " "encounter: 1.6 m (5' 3\").    Weight as of this encounter: 60.6 kg (133 lb 9.6 oz).    Physical Exam  GENERAL: alert and no distress  EYES: Eyes grossly normal to inspection, PERRL and conjunctivae and sclerae normal  HENT: ear canals and TM's normal, nose and mouth without ulcers or lesions  NECK: no adenopathy, no asymmetry, masses, or scars  RESP: lungs clear to auscultation - no rales, rhonchi or wheezes  BREAST: normal without masses, tenderness or nipple discharge and no palpable axillary masses or adenopathy  CV: regular rate and rhythm, normal S1 S2, no S3 or S4, no murmur, click or rub, no peripheral edema  ABDOMEN: soft, nontender, no hepatosplenomegaly, no masses and bowel sounds normal  MS: no gross musculoskeletal defects noted, no edema  SKIN: no suspicious lesions or rashes  NEURO: Normal strength and tone, mentation intact and speech normal  PSYCH: mentation appears normal, affect normal/bright        Signed Electronically by: Scott Cordova MD    "

## 2024-09-12 ENCOUNTER — ANCILLARY PROCEDURE (OUTPATIENT)
Dept: GENERAL RADIOLOGY | Facility: CLINIC | Age: 29
End: 2024-09-12
Attending: PHYSICIAN ASSISTANT
Payer: COMMERCIAL

## 2024-09-12 ENCOUNTER — OFFICE VISIT (OUTPATIENT)
Dept: FAMILY MEDICINE | Facility: CLINIC | Age: 29
End: 2024-09-12
Payer: COMMERCIAL

## 2024-09-12 VITALS
OXYGEN SATURATION: 100 % | WEIGHT: 132.8 LBS | HEIGHT: 63 IN | TEMPERATURE: 98.6 F | BODY MASS INDEX: 23.53 KG/M2 | HEART RATE: 98 BPM | DIASTOLIC BLOOD PRESSURE: 84 MMHG | RESPIRATION RATE: 18 BRPM | SYSTOLIC BLOOD PRESSURE: 120 MMHG

## 2024-09-12 DIAGNOSIS — K59.00 CONSTIPATION, UNSPECIFIED CONSTIPATION TYPE: ICD-10-CM

## 2024-09-12 DIAGNOSIS — K59.00 CONSTIPATION, UNSPECIFIED CONSTIPATION TYPE: Primary | ICD-10-CM

## 2024-09-12 DIAGNOSIS — R14.0 ABDOMINAL BLOATING: ICD-10-CM

## 2024-09-12 LAB
ALBUMIN UR-MCNC: NEGATIVE MG/DL
APPEARANCE UR: CLEAR
BILIRUB UR QL STRIP: NEGATIVE
COLOR UR AUTO: YELLOW
ERYTHROCYTE [DISTWIDTH] IN BLOOD BY AUTOMATED COUNT: 11.8 % (ref 10–15)
ERYTHROCYTE [SEDIMENTATION RATE] IN BLOOD BY WESTERGREN METHOD: 4 MM/HR (ref 0–20)
GLUCOSE UR STRIP-MCNC: NEGATIVE MG/DL
HCT VFR BLD AUTO: 40.2 % (ref 35–47)
HGB BLD-MCNC: 13.8 G/DL (ref 11.7–15.7)
HGB UR QL STRIP: NEGATIVE
KETONES UR STRIP-MCNC: NEGATIVE MG/DL
LEUKOCYTE ESTERASE UR QL STRIP: NEGATIVE
MCH RBC QN AUTO: 31.9 PG (ref 26.5–33)
MCHC RBC AUTO-ENTMCNC: 34.3 G/DL (ref 31.5–36.5)
MCV RBC AUTO: 93 FL (ref 78–100)
NITRATE UR QL: NEGATIVE
PH UR STRIP: 7 [PH] (ref 5–7)
PLATELET # BLD AUTO: 292 10E3/UL (ref 150–450)
RBC # BLD AUTO: 4.33 10E6/UL (ref 3.8–5.2)
SP GR UR STRIP: 1.02 (ref 1–1.03)
UROBILINOGEN UR STRIP-ACNC: 0.2 E.U./DL
WBC # BLD AUTO: 9.4 10E3/UL (ref 4–11)

## 2024-09-12 PROCEDURE — 86140 C-REACTIVE PROTEIN: CPT | Performed by: PHYSICIAN ASSISTANT

## 2024-09-12 PROCEDURE — 99213 OFFICE O/P EST LOW 20 MIN: CPT | Performed by: PHYSICIAN ASSISTANT

## 2024-09-12 PROCEDURE — 74019 RADEX ABDOMEN 2 VIEWS: CPT | Mod: TC | Performed by: RADIOLOGY

## 2024-09-12 PROCEDURE — 81003 URINALYSIS AUTO W/O SCOPE: CPT | Performed by: PHYSICIAN ASSISTANT

## 2024-09-12 PROCEDURE — 80048 BASIC METABOLIC PNL TOTAL CA: CPT | Performed by: PHYSICIAN ASSISTANT

## 2024-09-12 PROCEDURE — 36415 COLL VENOUS BLD VENIPUNCTURE: CPT | Performed by: PHYSICIAN ASSISTANT

## 2024-09-12 PROCEDURE — 85027 COMPLETE CBC AUTOMATED: CPT | Performed by: PHYSICIAN ASSISTANT

## 2024-09-12 PROCEDURE — 85652 RBC SED RATE AUTOMATED: CPT | Performed by: PHYSICIAN ASSISTANT

## 2024-09-12 ASSESSMENT — ENCOUNTER SYMPTOMS
VOMITING: 0
CONSTIPATION: 1
UNEXPECTED WEIGHT CHANGE: 0
DYSURIA: 0
CHILLS: 0
SHORTNESS OF BREATH: 0
FATIGUE: 1
BLOOD IN STOOL: 0
WHEEZING: 0
FEVER: 0
DIAPHORESIS: 0
HEMATURIA: 0
ANAL BLEEDING: 1
NAUSEA: 1
COUGH: 0
ABDOMINAL PAIN: 1

## 2024-09-12 ASSESSMENT — PAIN SCALES - GENERAL: PAINLEVEL: NO PAIN (0)

## 2024-09-12 NOTE — PATIENT INSTRUCTIONS
For further evaluation of your symptoms we are completing lab work and an abdominal x-ray.  All results and next steps will be sent via [employer].    To help with constipation, please start using MiraLAX daily as recommended on the back of the bottle.  Take this with a large glass of water.  I recommend using it daily for at least 2-3 months, until your bowel movements returned to normal for an extended period of time.

## 2024-09-12 NOTE — PROGRESS NOTES
Assessment & Plan     Abdominal bloating  Constipation, unspecified constipation type  Patient is a 29-year-old female who presents to clinic due to constipation and bloating ongoing for approximately 1.5 months.  Patient notes intermittent BRBPR with bowel movements.  She has tried a suppository as well as senna for management but notes cramping and diarrhea side effects.  Vital signs normal.  Physical exam significant for suprapubic tenderness to palpation.  Will complete labs to ensure no underlying signs of anemia, UTI, or inflammatory bowel disease.  Celiac testing completed in the past and negative.  Will complete abdominal x-ray to evaluate extent of constipation and for any signs of obstruction.  Recommended starting daily MiraLAX and using this for at least 2-3 months until bowel movements are consistently normal.  Follow-up precautions provided.  - CBC with platelets; Future  - Basic metabolic panel  (Ca, Cl, CO2, Creat, Gluc, K, Na, BUN); Future  - ESR: Erythrocyte sedimentation rate; Future  - CRP, inflammation; Future  - XR Abdomen 2 Views; Future  - UA Macroscopic with reflex to Microscopic and Culture - Lab Collect; Future      See Patient Instructions    Emelyn Almeida is a 29 year old, presenting for the following health issues:  bowel movement issue      9/12/2024     3:11 PM   Additional Questions   Roomed by Sara VERA MA   Accompanied by No one         9/12/2024     3:11 PM   Patient Reported Additional Medications   Patient reports taking the following new medications None     History of Present Illness       Reason for visit:  Change in bowel habits and pain  Symptom onset:  More than a month  Symptoms include:  Constipation, bleeding, bloating, pain  Symptom intensity:  Severe  Symptom progression:  Staying the same  Had these symptoms before:  No  What makes it worse:  No  What makes it better:  No   She is taking medications regularly.     Constipation and bloating since End of July  "prior to trip to [place]. Patient had ongoing constipation in [place] and treated with Senna. She has diarrhea/loose stools which persisted for the week long trip. When she returned stools were normal for a few days and now she is back to being constipated. Bright red blood with BM. Ongoing bloating, nausea. Patient has IUD thus low suspicion for pregnancy. Stomach issues throughout life-history of 1-3 day episodes of stomach cramps. Ongoing heartburn and feeling of needed to belch. No family history of Chron's or colitis. Negative testing for celiac in the past.     Review of Systems   Constitutional:  Positive for fatigue. Negative for chills, diaphoresis, fever and unexpected weight change.   Respiratory:  Negative for cough, shortness of breath and wheezing.    Gastrointestinal:  Positive for abdominal pain, anal bleeding, constipation and nausea. Negative for blood in stool and vomiting.   Genitourinary:  Negative for dysuria, hematuria and vaginal discharge.           Objective    /84   Pulse 98   Temp 98.6  F (37  C) (Temporal)   Resp 18   Ht 1.6 m (5' 3\")   Wt 60.2 kg (132 lb 12.8 oz)   LMP 08/21/2024 (Exact Date)   SpO2 100%   Breastfeeding No   BMI 23.52 kg/m    Body mass index is 23.52 kg/m .  Physical Exam  Vitals and nursing note reviewed.   Constitutional:       General: She is not in acute distress.     Appearance: Normal appearance.   HENT:      Head: Normocephalic and atraumatic.      Mouth/Throat:      Mouth: Mucous membranes are moist.      Pharynx: Oropharynx is clear.   Eyes:      Extraocular Movements: Extraocular movements intact.      Pupils: Pupils are equal, round, and reactive to light.   Cardiovascular:      Rate and Rhythm: Normal rate and regular rhythm.      Heart sounds: Normal heart sounds.   Pulmonary:      Effort: Pulmonary effort is normal.      Breath sounds: Normal breath sounds.   Abdominal:      General: Bowel sounds are normal.      Palpations: Abdomen is soft. "      Tenderness: There is abdominal tenderness (suprapubic). There is no guarding or rebound.   Musculoskeletal:         General: Normal range of motion.      Cervical back: Normal range of motion.   Skin:     General: Skin is warm and dry.   Neurological:      General: No focal deficit present.      Mental Status: She is alert.   Psychiatric:         Mood and Affect: Mood normal.         Behavior: Behavior normal.                    Signed Electronically by: Kiki Gibbons PA-C

## 2024-09-13 LAB
ANION GAP SERPL CALCULATED.3IONS-SCNC: 10 MMOL/L (ref 7–15)
BUN SERPL-MCNC: 12.6 MG/DL (ref 6–20)
CALCIUM SERPL-MCNC: 9.7 MG/DL (ref 8.8–10.4)
CHLORIDE SERPL-SCNC: 106 MMOL/L (ref 98–107)
CREAT SERPL-MCNC: 0.91 MG/DL (ref 0.51–0.95)
CRP SERPL-MCNC: <3 MG/L
EGFRCR SERPLBLD CKD-EPI 2021: 88 ML/MIN/1.73M2
GLUCOSE SERPL-MCNC: 96 MG/DL (ref 70–99)
HCO3 SERPL-SCNC: 24 MMOL/L (ref 22–29)
POTASSIUM SERPL-SCNC: 3.9 MMOL/L (ref 3.4–5.3)
SODIUM SERPL-SCNC: 140 MMOL/L (ref 135–145)

## 2024-11-15 ENCOUNTER — MEDICAL CORRESPONDENCE (OUTPATIENT)
Dept: HEALTH INFORMATION MANAGEMENT | Facility: CLINIC | Age: 29
End: 2024-11-15
Payer: COMMERCIAL

## 2024-11-18 ENCOUNTER — VIRTUAL VISIT (OUTPATIENT)
Dept: FAMILY MEDICINE | Facility: CLINIC | Age: 29
End: 2024-11-18
Payer: COMMERCIAL

## 2024-11-18 DIAGNOSIS — F90.2 ATTENTION DEFICIT HYPERACTIVITY DISORDER (ADHD), COMBINED TYPE: Primary | ICD-10-CM

## 2024-11-18 PROCEDURE — G2211 COMPLEX E/M VISIT ADD ON: HCPCS | Mod: 95 | Performed by: FAMILY MEDICINE

## 2024-11-18 PROCEDURE — 99213 OFFICE O/P EST LOW 20 MIN: CPT | Mod: 95 | Performed by: FAMILY MEDICINE

## 2024-11-18 RX ORDER — LISDEXAMFETAMINE DIMESYLATE 40 MG/1
40 CAPSULE ORAL DAILY
Qty: 30 CAPSULE | Refills: 0 | Status: SHIPPED | OUTPATIENT
Start: 2025-01-17 | End: 2025-02-16

## 2024-11-18 RX ORDER — LISDEXAMFETAMINE DIMESYLATE 40 MG/1
40 CAPSULE ORAL DAILY
Qty: 30 CAPSULE | Refills: 0 | Status: SHIPPED | OUTPATIENT
Start: 2024-12-18 | End: 2025-01-17

## 2024-11-18 RX ORDER — LISDEXAMFETAMINE DIMESYLATE 40 MG/1
40 CAPSULE ORAL DAILY
Qty: 30 CAPSULE | Refills: 0 | Status: SHIPPED | OUTPATIENT
Start: 2024-11-18 | End: 2024-12-18

## 2024-11-18 NOTE — PROGRESS NOTES
"  If patient has telephone visit, have they been educated on video visit as preferred visit method and offered to change to video visit? NOT APPLICABLE        Instructions Relayed to Patient by Virtual Roomer:     Patient is active on [employer]:   Relayed following to patient: \"It looks like you are active on [employer], are you able to join the visit this way? If not, do you need us to send you a link now or would you like your provider to send a link via text or email when they are ready to initiate the visit?\"      Patient Confirmed they will join visit via: Text Link to Cell Phone  Reminded patient to ensure they were logged on to virtual visit by arrival time listed.   Asked if patient has flexibility to initiate visit sooner than arrival time: patient stated yes, documented in appointment notes availability to initiate visit earlier than arrival time     If pediatric virtual visit, ensured pediatric patient along with parent/guardian will be present for video visit.     Patient offered the website [email or web address] and/or phone number to [employer] Help line: [phone]      Elle is a 29 year old who is being evaluated via a billable video visit.    How would you like to obtain your AVS? [employer]  If the video visit is dropped, the invitation should be resent by: Text to cell phone: [phone]  Will anyone else be joining your video visit? No      Assessment & Plan     Attention deficit hyperactivity disorder (ADHD), combined type  Stable, continue current dose of Vyvanse 40 mg daily, recheck in 3 months virtually or sooner if needed  - lisdexamfetamine (VYVANSE) 40 MG capsule; Take 1 capsule (40 mg) by mouth daily.  - lisdexamfetamine (VYVANSE) 40 MG capsule; Take 1 capsule (40 mg) by mouth daily.  - lisdexamfetamine (VYVANSE) 40 MG capsule; Take 1 capsule (40 mg) by mouth daily.            Chart documentation done in part with Dragon Voice recognition Software. Although reviewed after " completion, some word and grammatical error may remain.    See Patient Instructions    Subjective   Elle is a 29 year old, presenting for the following health issues:  A.D.H.D  Patient is here for a video visit instead of in person visit due to the current COVID-19 pandemic.        11/18/2024     3:52 PM   Additional Questions   Roomed by Ingrid   Accompanied by self     HPI     Medication Followup of ADHD  Taking Medication as prescribed: yes  Side Effects:  None  Medication Helping Symptoms:  yes        Review of Systems  CONSTITUTIONAL: NEGATIVE for fever, chills, change in weight  PSYCHIATRIC: History of ADHD      Objective           Vitals:  No vitals were obtained today due to virtual visit.    Physical Exam   GENERAL: alert and no distress  EYES: Eyes grossly normal to inspection  RESP: No audible wheeze, cough, or visible cyanosis.    NEURO: Cranial nerves grossly intact.  Mentation and speech appropriate for age.  PSYCH: Appropriate affect, tone, and pace of words          Video-Visit Details    Type of service:  Video Visit   Originating Location (pt. Location): Home    Distant Location (provider location):  On-site  Platform used for Video Visit: Cruz  Signed Electronically by: Scott Cordova MD

## 2024-11-19 ENCOUNTER — TRANSCRIBE ORDERS (OUTPATIENT)
Dept: OTHER | Age: 29
End: 2024-11-19

## 2024-12-12 ENCOUNTER — OFFICE VISIT (OUTPATIENT)
Dept: OPTOMETRY | Facility: CLINIC | Age: 29
End: 2024-12-12
Payer: COMMERCIAL

## 2024-12-12 DIAGNOSIS — G43.009 ATYPICAL MIGRAINE: ICD-10-CM

## 2024-12-12 DIAGNOSIS — I67.1 NONRUPTURED CEREBRAL ANEURYSM, INTERNAL CAROTID ARTERY: Primary | ICD-10-CM

## 2024-12-12 DIAGNOSIS — H53.9 VISION CHANGES: ICD-10-CM

## 2024-12-12 DIAGNOSIS — H52.13 MYOPIA, BILATERAL: ICD-10-CM

## 2024-12-12 DIAGNOSIS — H52.223 REGULAR ASTIGMATISM OF BOTH EYES: ICD-10-CM

## 2024-12-12 RX ORDER — VERAPAMIL HYDROCHLORIDE 40 MG/1
40 TABLET ORAL 3 TIMES DAILY
COMMUNITY

## 2024-12-12 ASSESSMENT — REFRACTION_MANIFEST
OD_AXIS: 102
OD_SPHERE: -5.25
OD_CYLINDER: +0.50
OD_CYLINDER: +0.50
OD_AXIS: 092
OS_CYLINDER: +0.25
OS_SPHERE: -5.00
OS_SPHERE: -5.25
OS_AXIS: 078
METHOD_AUTOREFRACTION: 1
OD_SPHERE: -5.25

## 2024-12-12 ASSESSMENT — CONF VISUAL FIELD
OD_INFERIOR_TEMPORAL_RESTRICTION: 0
OS_INFERIOR_NASAL_RESTRICTION: 0
OD_SUPERIOR_NASAL_RESTRICTION: 0
OS_SUPERIOR_TEMPORAL_RESTRICTION: 0
OS_SUPERIOR_NASAL_RESTRICTION: 0
OD_INFERIOR_NASAL_RESTRICTION: 0
OD_NORMAL: 1
OS_INFERIOR_TEMPORAL_RESTRICTION: 0
OD_SUPERIOR_TEMPORAL_RESTRICTION: 0
OS_NORMAL: 1

## 2024-12-12 ASSESSMENT — EXTERNAL EXAM - RIGHT EYE: OD_EXAM: NORMAL

## 2024-12-12 ASSESSMENT — TONOMETRY
IOP_METHOD: APPLANATION
OD_IOP_MMHG: 13
OS_IOP_MMHG: 13

## 2024-12-12 ASSESSMENT — CUP TO DISC RATIO
OD_RATIO: 0.3
OS_RATIO: 0.3

## 2024-12-12 ASSESSMENT — KERATOMETRY
OD_AXISANGLE2_DEGREES: 5
OS_K2POWER_DIOPTERS: 45.00
OD_K1POWER_DIOPTERS: 44.50
OS_K1POWER_DIOPTERS: 44.25
OS_AXISANGLE_DEGREES: 88
OS_AXISANGLE2_DEGREES: 178
OD_K2POWER_DIOPTERS: 45.25
OD_AXISANGLE_DEGREES: 95

## 2024-12-12 ASSESSMENT — VISUAL ACUITY
OD_CC: 20/20
METHOD: SNELLEN - LINEAR
OD_CC: 20/20
OD_CC+: -1
CORRECTION_TYPE: GLASSES
OS_CC: 20/20
OS_CC: 20/20
OS_CC+: -1

## 2024-12-12 ASSESSMENT — REFRACTION_WEARINGRX
OD_CYLINDER: +0.50
OD_SPHERE: -5.25
OS_CYLINDER: +0.25
OD_AXIS: 102
OS_SPHERE: -5.25
SPECS_TYPE: SVL
OS_AXIS: 078

## 2024-12-12 ASSESSMENT — SLIT LAMP EXAM - LIDS
COMMENTS: NORMAL
COMMENTS: NORMAL

## 2024-12-12 ASSESSMENT — EXTERNAL EXAM - LEFT EYE: OS_EXAM: NORMAL

## 2024-12-12 NOTE — PROGRESS NOTES
Chief Complaint   Patient presents with    Annual Eye Exam      Having migraines, vision gets blurred in right and pupil gets large.      Last Eye Exam: 2023  Dilated Previously: Yes    What are you currently using to see?  glasses       Distance Vision Acuity: Satisfied with vision    Near Vision Acuity: Satisfied with vision while reading and using computer with glasses and unaided    Eye Comfort: Has dry eyes and so occasionally painful   Do you use eye drops? : Yes: Contact solution and lubricating eye drops   Occupation or Hobbies: student     Rosina Navarro - Optometric Assistant          Medical, surgical and family histories reviewed and updated 12/12/2024.       OBJECTIVE: See Ophthalmology exam    ASSESSMENT:    ICD-10-CM    1. Nonruptured cerebral aneurysm, internal carotid artery  I67.1       2. Vision changes  H53.9 Adult Eye  Referral      3. Atypical migraine  G43.009 Adult Eye  Referral      4. Myopia, bilateral  H52.13       5. Regular astigmatism of both eyes  H52.223           PLAN:     Patient Instructions   See NEURO-OPHTHALMOLOGY FOR FURTHER EVAULATION OF ANEURYSM AND APTYPICAL MIGRAINE.    Myopia is a result of long eyes. It is commonly referred to as near-sightedness. Seeing clearly in the distance is the main challenge.    Astigmatism results from curvature differential in the cornea and crystalline lens which can cause a distorted image, as light rays are prevented from meeting at a common focus.    Eyeglass prescription given.      The affects of the dilating drops last for 4- 6 hours.  You will be more sensitive to light and vision will be blurry up close.  Do not drive if you do not feel comfortable.  Mydriatic sunglasses were given if needed.      Recommend annual eye exams.      Opal Denny O.D.  [address] [address]    [phone]

## 2024-12-12 NOTE — LETTER
12/12/2024      Jose Juan Ferris  [address] [facility] [address]      Dear Colleague,    Thank you for referring your patient, Jose Juan Ferris, to the [facility]. Please see a copy of my visit note below.    Chief Complaint   Patient presents with     Annual Eye Exam      Having migraines, vision gets blurred in right and pupil gets large.      Last Eye Exam: 2023  Dilated Previously: Yes    What are you currently using to see?  glasses       Distance Vision Acuity: Satisfied with vision    Near Vision Acuity: Satisfied with vision while reading and using computer with glasses and unaided    Eye Comfort: Has dry eyes and so occasionally painful   Do you use eye drops? : Yes: Contact solution and lubricating eye drops   Occupation or Hobbies: student     Rosina Navarro - Optometric Assistant          Medical, surgical and family histories reviewed and updated 12/12/2024.       OBJECTIVE: See Ophthalmology exam    ASSESSMENT:    ICD-10-CM    1. Nonruptured cerebral aneurysm, internal carotid artery  I67.1       2. Vision changes  H53.9 Adult Eye  Referral      3. Atypical migraine  G43.009 Adult Eye  Referral      4. Myopia, bilateral  H52.13       5. Regular astigmatism of both eyes  H52.223           PLAN:     Patient Instructions   See NEURO-OPHTHALMOLOGY FOR FURTHER EVAULATION OF ANEURYSM AND APTYPICAL MIGRAINE.    Myopia is a result of long eyes. It is commonly referred to as near-sightedness. Seeing clearly in the distance is the main challenge.    Astigmatism results from curvature differential in the cornea and crystalline lens which can cause a distorted image, as light rays are prevented from meeting at a common focus.    Eyeglass prescription given.      The affects of the dilating drops last for 4- 6 hours.  You will be more sensitive to light and vision will be blurry up close.  Do not drive if you do not feel comfortable.  Mydriatic sunglasses were  given if needed.      Recommend annual eye exams.      Opal Denny O.D.  [address] [id number] [phone]           Again, thank you for allowing me to participate in the care of your patient.        Sincerely,        Opal Denny OD

## 2024-12-12 NOTE — PATIENT INSTRUCTIONS
See NEURO-OPHTHALMOLOGY FOR FURTHER EVAULATION OF ANEURYSM AND APTYPICAL MIGRAINE.    Myopia is a result of long eyes. It is commonly referred to as near-sightedness. Seeing clearly in the distance is the main challenge.    Astigmatism results from curvature differential in the cornea and crystalline lens which can cause a distorted image, as light rays are prevented from meeting at a common focus.    Eyeglass prescription given.      The affects of the dilating drops last for 4- 6 hours.  You will be more sensitive to light and vision will be blurry up close.  Do not drive if you do not feel comfortable.  Mydriatic sunglasses were given if needed.      Recommend annual eye exams.      Opal Denny O.D.  [address] [id number] [phone]

## 2025-01-28 ENCOUNTER — VIRTUAL VISIT (OUTPATIENT)
Dept: FAMILY MEDICINE | Facility: CLINIC | Age: 30
End: 2025-01-28
Payer: COMMERCIAL

## 2025-01-28 DIAGNOSIS — R14.0 ABDOMINAL BLOATING: ICD-10-CM

## 2025-01-28 DIAGNOSIS — K59.09 CHRONIC CONSTIPATION: Primary | ICD-10-CM

## 2025-01-28 DIAGNOSIS — K92.1 BLOOD IN STOOL: ICD-10-CM

## 2025-01-28 PROCEDURE — 98005 SYNCH AUDIO-VIDEO EST LOW 20: CPT | Performed by: NURSE PRACTITIONER

## 2025-01-28 ASSESSMENT — ANXIETY QUESTIONNAIRES
GAD7 TOTAL SCORE: 0
1. FEELING NERVOUS, ANXIOUS, OR ON EDGE: NOT AT ALL
4. TROUBLE RELAXING: NOT AT ALL
GAD7 TOTAL SCORE: 0
8. IF YOU CHECKED OFF ANY PROBLEMS, HOW DIFFICULT HAVE THESE MADE IT FOR YOU TO DO YOUR WORK, TAKE CARE OF THINGS AT HOME, OR GET ALONG WITH OTHER PEOPLE?: NOT DIFFICULT AT ALL
2. NOT BEING ABLE TO STOP OR CONTROL WORRYING: NOT AT ALL
7. FEELING AFRAID AS IF SOMETHING AWFUL MIGHT HAPPEN: NOT AT ALL
GAD7 TOTAL SCORE: 0
3. WORRYING TOO MUCH ABOUT DIFFERENT THINGS: NOT AT ALL
5. BEING SO RESTLESS THAT IT IS HARD TO SIT STILL: NOT AT ALL
6. BECOMING EASILY ANNOYED OR IRRITABLE: NOT AT ALL
7. FEELING AFRAID AS IF SOMETHING AWFUL MIGHT HAPPEN: NOT AT ALL
IF YOU CHECKED OFF ANY PROBLEMS ON THIS QUESTIONNAIRE, HOW DIFFICULT HAVE THESE PROBLEMS MADE IT FOR YOU TO DO YOUR WORK, TAKE CARE OF THINGS AT HOME, OR GET ALONG WITH OTHER PEOPLE: NOT DIFFICULT AT ALL

## 2025-01-28 NOTE — PROGRESS NOTES
"Elle is a 29 year old who is being evaluated via a billable video visit.    How would you like to obtain your AVS? MyChart  If the video visit is dropped, the invitation should be resent by: Text to cell phone: [phone]  Will anyone else be joining your video visit? No      Assessment & Plan     Chronic constipation  Chronic since July. She has been on daily miralax since then and recently has had some break through constipation. She also noticed some blood mixed in with her stool today with no signs of hemorrhoids nor blood when wiping. No family hx of colon cancer. Colonoscopy referral placed.     - Adult GI  Referral - Consult Only; Future  - Adult GI  Referral - Procedure Only; Future    Blood in stool  See above.     - Adult GI  Referral - Procedure Only; Future    Abdominal bloating  Hx of \"digestive issues\" that she has had CT scan done for and was told it was negative. She occasionally will have abdominal pain and then diarrhea. She is wondering if she has IBS. She would like further work up by GI.     - Adult GI  Referral - Consult Only; Future            Patient Instructions   GI referral placed.     Colonoscopy referral placed.     Subjective   Elle is a 29 year old, presenting for the following health issues:  Bowel Problems        1/28/2025    12:43 PM   Additional Questions   Roomed by Ngoc   Accompanied by none         1/28/2025    12:43 PM   Patient Reported Additional Medications   Patient reports taking the following new medications none     History of Present Illness       Reason for visit:  Change in bowel habits and bleeding  Symptom onset:  More than a month  Symptoms include:  Constipation, difficulty pooping, blood in stool  Symptom intensity:  Moderate  Symptom progression:  Worsening  Had these symptoms before:  Yes  Has tried/received treatment for these symptoms:  Yes  Previous treatment was successful:  No  What makes it worse:  No  What makes " "it better:  No   She is taking medications regularly.       Additional provider notes: Patient presents for the following:     Bowel changes: symptoms started in the summer. No stress or dietary changes. She has been regular for years, then in July she was very backed up and had a lot of pain. She took miralax for 4 months and about a month ago she was having some break through constipation. Doesn't feel like she is evacuating completely. Hx of hemorrhoids and bleeding. Now she isn't having symptoms of hemorrhoids. No blood when she wipes. She started noticing blood in her poop today but no signs of hemorrhoids and the blood is mixed in with stool and it is bright red blood. Feels it is more difficult to have BM recently.   -no family hx of colon cancer, crohns, colitis.   -states she has a \"hx of digestive issues over the years\" - has had CT scan that was normal. Some abdominal cramping and diarrhea at times.     Allergies   Allergen Reactions    Latex Rash       Current Outpatient Medications   Medication Sig Dispense Refill    IBUPROFEN PO Take 400 mg by mouth       lisdexamfetamine (VYVANSE) 40 MG capsule Take 1 capsule (40 mg) by mouth daily. 30 capsule 0    metroNIDAZOLE (METROGEL) 0.75 % external gel Apply topically 2 times daily 45 g 1    verapamil (CALAN) 40 MG tablet Take 40 mg by mouth 3 times daily.      loratadine (CLARITIN) 10 MG tablet Take 10 mg by mouth daily (Patient not taking: Reported on 1/28/2025)       No current facility-administered medications for this visit.       Past Medical History:   Diagnosis Date    ADHD     Anxiety     Depressive disorder     OCD (obsessive compulsive disorder)     UTI (lower urinary tract infection)             Review of Systems  Constitutional, HEENT, cardiovascular, pulmonary, gi and gu systems are negative, except as otherwise noted.      Objective           Vitals:  No vitals were obtained today due to virtual visit.    Physical Exam   GENERAL: alert and no " distress  EYES: Eyes grossly normal to inspection.  No discharge or erythema, or obvious scleral/conjunctival abnormalities.  RESP: No audible wheeze, cough, or visible cyanosis.    SKIN: Visible skin clear. No significant rash, abnormal pigmentation or lesions.  NEURO: Cranial nerves grossly intact.  Mentation and speech appropriate for age.  PSYCH: Appropriate affect, tone, and pace of words          Video-Visit Details    Type of service:  Video Visit   *10 minutes 39 seconds  Originating Location (pt. Location): Home    Distant Location (provider location):  Off-site  Platform used for Video Visit: Cruz  Signed Electronically by: Carmen Burns DNP

## 2025-01-29 ENCOUNTER — TELEPHONE (OUTPATIENT)
Dept: GASTROENTEROLOGY | Facility: CLINIC | Age: 30
End: 2025-01-29
Payer: COMMERCIAL

## 2025-01-29 DIAGNOSIS — Z12.11 SPECIAL SCREENING FOR MALIGNANT NEOPLASMS, COLON: Primary | ICD-10-CM

## 2025-01-29 RX ORDER — BISACODYL 5 MG/1
TABLET, DELAYED RELEASE ORAL
Qty: 4 TABLET | Refills: 0 | Status: SHIPPED | OUTPATIENT
Start: 2025-01-29

## 2025-01-29 NOTE — TELEPHONE ENCOUNTER
Pre visit planning completed.      Procedure details:    Patient scheduled for Colonoscopy on 2/11/25.     Approximate arrival time: 0845. Procedure time 0930.   *Ensure patient is aware that endoscopy team will be calling about 2 days prior to procedure date to confirm arrival time as this may change.     Facility location: [facility]; [address], [place] [address]. Check in location: 2nd Floor at Surgery desk.  *Disclaimer: Drivers are to check in with patient and stay on campus during procedure.     Sedation type: Conscious sedation   first endoscopy, discuss sedation with patient.  PABLO 7 score 0    Pre op exam needed? No.    Indication for procedure: constipation      Chart review:     Electronic implanted devices? No    Recent diagnosis of diverticulitis within the last 6 weeks? No      Medication review:    Diabetic? No    Anticoagulants? No    Weight loss medication/injectable? No GLP-1 medication per patient's medication list. Nursing to verify with pre-assessment call.    Other medication HOLDING recommendations:  N/A      Prep for procedure:     Bowel prep recommendation: Extended Golytely. Bowel prep sent to    [id number] [place], [place] - [address]    Due to: constipation noted or reported    Procedure information and instructions sent via [facility]         Corinne Kliber, RN  Endoscopy Procedure Pre Assessment   [phone] option 2

## 2025-01-29 NOTE — TELEPHONE ENCOUNTER
"Endoscopy Scheduling Screen    Have you had any respiratory illness or flu-like symptoms in the last 10 days?  No    What is your communication preference for Instructions and/or Bowel Prep?   MyChart    What insurance is in the chart?  Other:  R    Ordering/Referring Provider:     STEPHANIE STOREY      (If ordering provider performs procedure, schedule with ordering provider unless otherwise instructed. )    BMI: Estimated body mass index is 23.52 kg/m  as calculated from the following:    Height as of 9/12/24: 1.6 m (5' 3\").    Weight as of 9/12/24: 60.2 kg (132 lb 12.8 oz).     Sedation Ordered  moderate sedation.   If patient BMI > 50 do not schedule in [facility].    If patient BMI > 45 do not schedule at [facility].    Are you taking methadone or Suboxone?  NO, No RN review required.    Have you been diagnosed and are being treated for severe PTSD or severe anxiety?  NO, No RN review required.    Are you taking any prescription medications for pain 3 or more times per week?   NO, No RN review required.    Do you have a history of malignant hyperthermia?  No    (Females) Are you currently pregnant?   No     Have you been diagnosed or told you have pulmonary hypertension?   No    Do you have an LVAD?  No    Have you been told you have moderate to severe sleep apnea?  No.    Have you been told you have COPD, asthma, or any other lung disease?  No    Do you have any heart conditions?  No     Have you ever had or are you waiting for an organ transplant?  No. Continue scheduling, no site restrictions.    Have you had a stroke or transient ischemic attack (TIA aka \"mini stroke\" in the last 6 months?   No    Have you been diagnosed with or been told you have cirrhosis of the liver?   No.    Are you currently on dialysis?   No    Do you need assistance transferring?   No    BMI: Estimated body mass index is 23.52 kg/m  as calculated from the following:    Height as of 9/12/24: 1.6 m (5' 3\").    Weight as of 9/12/24: 60.2 kg (132 " lb 12.8 oz).     Is patients BMI > 40 and scheduling location [facility]?  No    Do you take an injectable or oral medication for weight loss or diabetes (excluding insulin)?  No    Do you take the medication Naltrexone?  No    Do you take blood thinners?  No       Prep   Are you currently on dialysis or do you have chronic kidney disease?  No    Do you have a diagnosis of diabetes?  No    Do you have a diagnosis of cystic fibrosis (CF)?  No    On a regular basis do you go 3 -5 days between bowel movements?  No    BMI > 40?  No    Preferred Pharmacy:    [id number] [address] [address]  Phone: [phone] Fax: [phone]          Final Scheduling Details     Procedure scheduled  Colonoscopy    Surgeon:  Lamar     Date of procedure:  2/11     Pre-OP / PAC:   No - Not required for this site.    Location  [facility] - [facility] - Per order.    Sedation   Moderate Sedation - Per order.      Patient Reminders:   You will receive a call from a Nurse to review instructions and health history.  This assessment must be completed prior to your procedure.  Failure to complete the Nurse assessment may result in the procedure being cancelled.      On the day of your procedure, please designate an adult(s) who can drive you home stay with you for the next 24 hours. The medicines used in the exam will make you sleepy. You will not be able to drive.      You cannot take public transportation, ride share services, or non-medical taxi service without a responsible caregiver.  Medical transport services are allowed with the requirement that a responsible caregiver will receive you at your destination.  We require that drivers and caregivers are confirmed prior to your procedure.

## 2025-01-29 NOTE — TELEPHONE ENCOUNTER
Pre assessment completed for upcoming procedure.   (Please see previous telephone encounter notes for complete details)    Procedure details:    Approximate time and facility location reviewed.   Patient is aware that endoscopy team will be calling about 2 days prior to confirm arrival time.    Designated  policy reviewed and that  requests drivers to check in and stay on campus.   *Disclaimer - please notify the  RN GI staff with any  issues/concerns.     Instructed to have someone stay 6  hours post procedure.     Medication review:    Medications reviewed. Please see supporting documentation below. Holding recommendations discussed (if applicable).       Prep for procedure:     Procedure prep instructions reviewed.        Any additional information needed:  N/A      Patient verbalized understanding and had no questions or concerns at this time.      Corinne Kliber, RN  Endoscopy Procedure Pre Assessment   [phone] option 2

## 2025-02-11 ENCOUNTER — HOSPITAL ENCOUNTER (OUTPATIENT)
Facility: AMBULATORY SURGERY CENTER | Age: 30
Discharge: HOME | End: 2025-02-11
Attending: STUDENT IN AN ORGANIZED HEALTH CARE EDUCATION/TRAINING PROGRAM | Admitting: STUDENT IN AN ORGANIZED HEALTH CARE EDUCATION/TRAINING PROGRAM
Payer: COMMERCIAL

## 2025-02-11 VITALS
HEART RATE: 80 BPM | RESPIRATION RATE: 16 BRPM | OXYGEN SATURATION: 100 % | SYSTOLIC BLOOD PRESSURE: 113 MMHG | DIASTOLIC BLOOD PRESSURE: 78 MMHG | TEMPERATURE: 98.6 F

## 2025-02-11 LAB — COLONOSCOPY: NORMAL

## 2025-02-11 PROCEDURE — G8918 PT W/O PREOP ORDER IV AB PRO: HCPCS

## 2025-02-11 PROCEDURE — 45378 DIAGNOSTIC COLONOSCOPY: CPT

## 2025-02-11 PROCEDURE — G8907 PT DOC NO EVENTS ON DISCHARG: HCPCS

## 2025-02-11 RX ORDER — LIDOCAINE 40 MG/G
CREAM TOPICAL
Status: DISCONTINUED | OUTPATIENT
Start: 2025-02-11 | End: 2025-02-12 | Stop reason: HOSPADM

## 2025-02-11 RX ORDER — FENTANYL CITRATE 50 UG/ML
INJECTION, SOLUTION INTRAMUSCULAR; INTRAVENOUS PRN
Status: DISCONTINUED | OUTPATIENT
Start: 2025-02-11 | End: 2025-02-11 | Stop reason: HOSPADM

## 2025-02-11 RX ORDER — ONDANSETRON 2 MG/ML
4 INJECTION INTRAMUSCULAR; INTRAVENOUS
Status: COMPLETED | OUTPATIENT
Start: 2025-02-11 | End: 2025-02-11

## 2025-02-11 RX ADMIN — ONDANSETRON 4 MG: 2 INJECTION INTRAMUSCULAR; INTRAVENOUS at 09:08

## 2025-02-11 NOTE — H&P
Endoscopy H&P    Jose Juan SALINAS [facility]  [id number]  female  29 year old      Reason for procedure/surgery: Diagnostic colonoscopy for evaluation of constipation and bloody stools. Also having anal pain with bowel movements. No family history of CRC.    Patient Active Problem List   Diagnosis    PABLO (generalized anxiety disorder)    Panic attack    Disorganized thinking    OCD (obsessive compulsive disorder)    Tension headache    Migraine with aura and without status migrainosus, not intractable    Chronic bilateral lower abdominal pain    Abdominal bloating    Acne rosacea    Attention deficit hyperactivity disorder (ADHD), combined type       Past Surgical History:    Past Surgical History:   Procedure Laterality Date    ENT SURGERY      tonsillectomy; ear tubes    HEAD & NECK SURGERY      wisdom teeth extraction       Past Medical History:   Past Medical History:   Diagnosis Date    ADHD     Anxiety     Depressive disorder     OCD (obsessive compulsive disorder)     UTI (lower urinary tract infection)        Social History:   Social History     Tobacco Use    Smoking status: Never     Passive exposure: Past    Smokeless tobacco: Never   Substance Use Topics    Alcohol use: Yes     Comment: socially       Family History:   Family History   Problem Relation Age of Onset    Depression Mother     Anxiety Disorder Mother     Mental Illness Mother         OCD    Attention Deficit Disorder Mother     Hypertension Mother     Substance Abuse Mother     Mental Illness Father     Anxiety Disorder Father     Depression Father     Attention Deficit Disorder Father     Substance Abuse Father     Alcoholism Maternal Grandmother     Substance Abuse Maternal Grandmother     Alcoholism Maternal Grandfather     Substance Abuse Maternal Grandfather     Substance Abuse Maternal Uncle     Schizophrenia Maternal Uncle     Substance Abuse Brother     Diabetes No family hx of     Coronary Artery Disease No family hx of     Hypertension No  family hx of     Hyperlipidemia No family hx of     Cerebrovascular Disease No family hx of     Breast Cancer No family hx of     Colon Cancer No family hx of     Prostate Cancer No family hx of     Other Cancer No family hx of     Anesthesia Reaction No family hx of     Asthma No family hx of     Osteoporosis No family hx of     Genetic Disorder No family hx of     Thyroid Disease No family hx of     Obesity No family hx of     Unknown/Adopted No family hx of        Allergies:   Allergies   Allergen Reactions    Latex Rash       Active Medications:   Current Outpatient Medications   Medication Sig Dispense Refill    lisdexamfetamine (VYVANSE) 40 MG capsule Take 1 capsule (40 mg) by mouth daily. 30 capsule 0    verapamil (CALAN) 40 MG tablet Take 40 mg by mouth 3 times daily.      bisacodyl (DULCOLAX) 5 MG EC tablet Two days prior to exam take two (2) tablets at 4pm. One day prior to exam take two (2) tablets at 4pm 4 tablet 0    IBUPROFEN PO Take 400 mg by mouth       loratadine (CLARITIN) 10 MG tablet Take 10 mg by mouth daily (Patient not taking: Reported on 1/28/2025)      metroNIDAZOLE (METROGEL) 0.75 % external gel Apply topically 2 times daily 45 g 1    polyethylene glycol (GOLYTELY) 236 g suspension Two days before procedure at 5PM fill first container with water. Mix and drink an 8 oz glass every 15 minutes until HALF of the container is gone. Place the remainder in the refrigerator. One day before procedure at 5PM drink second half of bowel prep. Drink an 8 oz glass every 15 minutes until it is gone. Day of procedure 6 hours before arrival time fill the 2nd container with water. Mix and drink an 8 oz glass every 15 minutes until HALF of the container is gone. Discard the remaining solution. 8000 mL 0       Systemic Review:   CONSTITUTIONAL: NEGATIVE for fever, chills, change in weight  ENT/MOUTH: NEGATIVE for ear, mouth and throat problems  RESP: NEGATIVE for significant cough or SOB  CV: NEGATIVE for  chest pain, palpitations or peripheral edema    Physical Examination:   Vital Signs: /82   Temp 98.6  F (37  C) (Temporal)   Resp 16   SpO2 100%   GENERAL: healthy, alert and no distress  NECK: no adenopathy, no asymmetry, masses, or scars  RESP: lungs clear to auscultation - no rales, rhonchi or wheezes  CV: regular rate and rhythm, normal S1 S2, no S3 or S4, no murmur, click or rub, no peripheral edema and peripheral pulses strong  ABDOMEN: soft, nontender, no hepatosplenomegaly, no masses and bowel sounds normal  MS: no gross musculoskeletal defects noted, no edema    Plan: Appropriate to proceed as scheduled.      Gina Pedro MD  2/11/2025    PCP:  Scott Cordova

## 2025-03-13 ENCOUNTER — OFFICE VISIT (OUTPATIENT)
Dept: GASTROENTEROLOGY | Facility: CLINIC | Age: 30
End: 2025-03-13
Attending: NURSE PRACTITIONER
Payer: COMMERCIAL

## 2025-03-13 VITALS
SYSTOLIC BLOOD PRESSURE: 107 MMHG | DIASTOLIC BLOOD PRESSURE: 73 MMHG | HEART RATE: 77 BPM | WEIGHT: 136 LBS | OXYGEN SATURATION: 100 % | HEIGHT: 63 IN | BODY MASS INDEX: 24.1 KG/M2

## 2025-03-13 DIAGNOSIS — K60.2 ANAL FISSURE: Primary | ICD-10-CM

## 2025-03-13 DIAGNOSIS — R11.0 NAUSEA: ICD-10-CM

## 2025-03-13 DIAGNOSIS — R10.30 LOWER ABDOMINAL PAIN: ICD-10-CM

## 2025-03-13 DIAGNOSIS — R14.0 ABDOMINAL BLOATING: ICD-10-CM

## 2025-03-13 DIAGNOSIS — K59.09 CHRONIC CONSTIPATION: ICD-10-CM

## 2025-03-13 RX ORDER — LISDEXAMFETAMINE DIMESYLATE 40 MG/1
40 CAPSULE ORAL DAILY
COMMUNITY

## 2025-03-13 ASSESSMENT — PAIN SCALES - GENERAL: PAINLEVEL_OUTOF10: NO PAIN (0)

## 2025-03-13 NOTE — PATIENT INSTRUCTIONS
It was a pleasure meeting with you today and discussing your healthcare plan. Below is a summary of what we covered:    --schedule abdominal x-ray.   ---- Recommend starting supplementation with a powdered soluble fiber. When used on a daily basis, this can help regulate the consistency of your stools. Metamucil (psyllium) and Citrucel are preferred examples. You can start with 1-2 teaspoons per day, with goal to gradually increase to 1 tablespoon daily. You can increase up to 1 tablespoon three times daily if needed. It is important to stay well-hydrated with use of fiber supplementation and to make sure that the fiber powder is well mixed with water as directed on the label. You may experience some bloating with initiation of fiber, which will improve over the first few weeks. A good trial to evaluate the effect is 3-6 months. Of note, many of the fiber products contain artificial sweeteners, which can cause bloating, gas, and diarrhea in those who may be sensitive to artificial sweeteners. If this is the case, recommend trying Metamucil Premium Blend (with Stevia), Metamucil 4-in-1 without Added Sweeteners, or Bellway (sweetened with Monk fruit extract).      Bloating Lifestyle Modifications:   -- Patients are advised to have regular meal patterns, avoid large meals, reduce intake of insoluble fibers/fat/caffeine as well as alcohol  -- Avoidance of artificial sweeteners including sorbitol, truvia, maltitol, mannitol, xylitol, glycerol and lactilol.   -- Studies have shown that following an IBS diet or exclusion of high gas producing foods has a significant impact on symptoms. Foods that have been shown to increase bloating/distension/flatulence include beans, cabbage, onions, celery, carrots, raisins, etc. Significant symptomatic improvement has been seen in patients who adhere to a low fermentable oligo-, di-, and monosaccharides and polyols (FODMAPs) diet   -- Many patients do benefit from discussion with a  registered dietician to discuss these dietary options  -- Other treatment options include the use of enteric coated peppermint (IBGuard) or Simethicone.       Please follow up in clinic in 3 months or as needed.       Please see below for any additional questions and scheduling guidelines.    Sign up for BlueWare: BlueWare patient portal serves as a secure platform for accessing your medical records from the [facility]. Additionally, [employer]Ware facilitates easy, timely, and secure messaging with your care team. If you have not signed up, you may do so by using the provided code or calling [phone].    Coordinating your care after your visit:  There are multiple options for scheduling your follow-up care based on your provider's recommendation.    How do I schedule a follow-up clinic appointment:   After your appointment, you may receive scheduling assistance with the Clinic Coordinators by having a seat in the waiting room and a Clinic Coordinator will call you up to schedule.  Virtual visits or after you leave the clinic:  Your provider has placed a follow-up order in the [employer]Ware portal for scheduling your return appointment. A member of the scheduling team will contact you to schedule.  [facility] Scheduling: Timely scheduling through [employer] is advised to ensure appointment availability.   Call to schedule: You may schedule your follow-up appointment(s) by calling [phone], option 1.    How do I schedule my endoscopy or colonoscopy procedure:  If a procedure, such as a colonoscopy or upper endoscopy was ordered by your provider, the scheduling team will contact you to schedule this procedure. Or you may choose to call to schedule at   [phone], option 2.  Please allow 20-30 minutes when scheduling a procedure.    How do I get my blood work done? To get your blood work done, you need to schedule a lab appointment at an [facility]. There are multiple ways to schedule:   At the  clinic: The Clinic Coordinator you meet after your visit can help you schedule a lab appointment.   TrafficCast scheduling: TrafficCast offers online lab scheduling at all [facility] laboratory locations.   Call to schedule: You can call [phone] to schedule your lab appointment.    How do I schedule my imaging study: To schedule imaging studies, such as CT scans, ultrasounds, MRIs, or X-rays, contact Imaging Services at [phone].    How do I schedule a referral to another doctor: If your provider recommended a referral to another specialist(s), the referral order was placed by your provider. You will receive a phone call to schedule this referral, or you may choose to call the number attached to the referral to self-schedule.    For Post-Visit Question(s):  For any inquiries following today's visit:  Please utilize [employer]Cast messaging and allow 48 hours for reply or contact the Call Center during normal business hours at [phone], option 3.  For Emergent After-hours questions, contact the On-Call GI Fellow through the [facility]  at [phone].  In addition, you may contact your Nurse directly using the provided contact information.    Test Results:  Test results will be accessible via [employer] in compliance with the 21st Century Cures Act. This means that your results will be available to you at the same time as your provider. Often you may see your results before your provider does. Results are reviewed by staff within two weeks with communication follow-up. Results may be released in the patient portal prior to your care team review.    Prescription Refill(s):  Medication prescribed by your provider will be addressed during your visit. For future refills, please coordinate with your pharmacy. If you have not had a recent clinic visit or routine labs, for your safety, your provider may not be able to refill your prescription.
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
## 2025-03-13 NOTE — NURSING NOTE
"Chief Complaint   Patient presents with    New Patient     Chronic constipation; Abdominal bloating     She requests these members of her care team be copied on today's visit information:  PCP: Scott Cordova    Referring Provider:  Carmen Burns DNP  [address] [place] [address]    Vitals:    03/13/25 0857   BP: 107/73   Pulse: 77   SpO2: 100%   Weight: 61.7 kg (136 lb)   Height: 1.6 m (5' 3\")     Body mass index is 24.09 kg/m .    Do you have a history of colon cancer in your immediate family? NO    Medications were reconciled.    Jil Sauceda, Clinical Assistant      "
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
## 2025-04-08 ENCOUNTER — TRANSFERRED RECORDS (OUTPATIENT)
Dept: HEALTH INFORMATION MANAGEMENT | Facility: CLINIC | Age: 30
End: 2025-04-08
Payer: COMMERCIAL

## 2025-05-05 DIAGNOSIS — F34.0 CYCLOTHYMIC DISORDER: Primary | ICD-10-CM

## 2025-06-02 ENCOUNTER — LAB (OUTPATIENT)
Dept: LAB | Facility: CLINIC | Age: 30
End: 2025-06-02
Payer: COMMERCIAL

## 2025-06-02 DIAGNOSIS — F34.0 CYCLOTHYMIC DISORDER: ICD-10-CM

## 2025-06-02 LAB
ALBUMIN SERPL BCG-MCNC: 4.8 G/DL (ref 3.5–5.2)
ALP SERPL-CCNC: 66 U/L (ref 40–150)
ALT SERPL W P-5'-P-CCNC: 14 U/L (ref 0–50)
ANION GAP SERPL CALCULATED.3IONS-SCNC: 12 MMOL/L (ref 7–15)
AST SERPL W P-5'-P-CCNC: 19 U/L (ref 0–45)
BILIRUB SERPL-MCNC: 0.9 MG/DL
BUN SERPL-MCNC: 7 MG/DL (ref 6–20)
CALCIUM SERPL-MCNC: 9.7 MG/DL (ref 8.8–10.4)
CHLORIDE SERPL-SCNC: 105 MMOL/L (ref 98–107)
CHOLEST SERPL-MCNC: 194 MG/DL
CREAT SERPL-MCNC: 0.85 MG/DL (ref 0.51–0.95)
EGFRCR SERPLBLD CKD-EPI 2021: >90 ML/MIN/1.73M2
EST. AVERAGE GLUCOSE BLD GHB EST-MCNC: 94 MG/DL
FASTING STATUS PATIENT QL REPORTED: YES
FASTING STATUS PATIENT QL REPORTED: YES
GLUCOSE SERPL-MCNC: 97 MG/DL (ref 70–99)
HBA1C MFR BLD: 4.9 % (ref 0–5.6)
HCO3 SERPL-SCNC: 23 MMOL/L (ref 22–29)
HDLC SERPL-MCNC: 92 MG/DL
LDLC SERPL CALC-MCNC: 83 MG/DL
NONHDLC SERPL-MCNC: 102 MG/DL
POTASSIUM SERPL-SCNC: 3.8 MMOL/L (ref 3.4–5.3)
PROT SERPL-MCNC: 7.4 G/DL (ref 6.4–8.3)
SODIUM SERPL-SCNC: 140 MMOL/L (ref 135–145)
TRIGL SERPL-MCNC: 95 MG/DL

## 2025-06-02 PROCEDURE — 83036 HEMOGLOBIN GLYCOSYLATED A1C: CPT

## 2025-06-02 PROCEDURE — 80053 COMPREHEN METABOLIC PANEL: CPT

## 2025-06-02 PROCEDURE — 36415 COLL VENOUS BLD VENIPUNCTURE: CPT

## 2025-06-02 PROCEDURE — 80061 LIPID PANEL: CPT

## 2025-06-03 ENCOUNTER — TRANSFERRED RECORDS (OUTPATIENT)
Dept: HEALTH INFORMATION MANAGEMENT | Facility: CLINIC | Age: 30
End: 2025-06-03
Payer: COMMERCIAL

## 2025-06-17 ENCOUNTER — TRANSFERRED RECORDS (OUTPATIENT)
Dept: HEALTH INFORMATION MANAGEMENT | Facility: CLINIC | Age: 30
End: 2025-06-17
Payer: COMMERCIAL

## 2025-07-21 ENCOUNTER — PATIENT OUTREACH (OUTPATIENT)
Dept: CARE COORDINATION | Facility: CLINIC | Age: 30
End: 2025-07-21
Payer: COMMERCIAL

## 2025-07-27 ENCOUNTER — OFFICE VISIT (OUTPATIENT)
Dept: URGENT CARE | Facility: URGENT CARE | Age: 30
End: 2025-07-27
Payer: COMMERCIAL

## 2025-07-27 VITALS
OXYGEN SATURATION: 99 % | BODY MASS INDEX: 22.57 KG/M2 | TEMPERATURE: 98.3 F | WEIGHT: 127.4 LBS | SYSTOLIC BLOOD PRESSURE: 112 MMHG | HEART RATE: 92 BPM | DIASTOLIC BLOOD PRESSURE: 75 MMHG

## 2025-07-27 DIAGNOSIS — R35.0 URINARY FREQUENCY: ICD-10-CM

## 2025-07-27 DIAGNOSIS — N30.00 ACUTE CYSTITIS WITHOUT HEMATURIA: Primary | ICD-10-CM

## 2025-07-27 LAB
ALBUMIN UR-MCNC: NEGATIVE MG/DL
APPEARANCE UR: ABNORMAL
BACTERIA #/AREA URNS HPF: ABNORMAL /HPF
BILIRUB UR QL STRIP: NEGATIVE
COLOR UR AUTO: YELLOW
GLUCOSE UR STRIP-MCNC: NEGATIVE MG/DL
HCG UR QL: NEGATIVE
HGB UR QL STRIP: NEGATIVE
KETONES UR STRIP-MCNC: NEGATIVE MG/DL
LEUKOCYTE ESTERASE UR QL STRIP: ABNORMAL
MUCOUS THREADS #/AREA URNS LPF: PRESENT /LPF
NITRATE UR QL: NEGATIVE
PH UR STRIP: 6.5 [PH] (ref 5–7)
RBC #/AREA URNS AUTO: ABNORMAL /HPF
SP GR UR STRIP: 1.02 (ref 1–1.03)
SQUAMOUS #/AREA URNS AUTO: ABNORMAL /LPF
UROBILINOGEN UR STRIP-ACNC: 1 E.U./DL
WBC #/AREA URNS AUTO: ABNORMAL /HPF

## 2025-07-27 PROCEDURE — 99214 OFFICE O/P EST MOD 30 MIN: CPT | Performed by: PHYSICIAN ASSISTANT

## 2025-07-27 PROCEDURE — 81025 URINE PREGNANCY TEST: CPT | Performed by: PHYSICIAN ASSISTANT

## 2025-07-27 PROCEDURE — 81001 URINALYSIS AUTO W/SCOPE: CPT | Performed by: PHYSICIAN ASSISTANT

## 2025-07-27 PROCEDURE — 87088 URINE BACTERIA CULTURE: CPT | Performed by: PHYSICIAN ASSISTANT

## 2025-07-27 PROCEDURE — 87186 SC STD MICRODIL/AGAR DIL: CPT | Performed by: PHYSICIAN ASSISTANT

## 2025-07-27 PROCEDURE — 87086 URINE CULTURE/COLONY COUNT: CPT | Performed by: PHYSICIAN ASSISTANT

## 2025-07-27 PROCEDURE — 3078F DIAST BP <80 MM HG: CPT | Performed by: PHYSICIAN ASSISTANT

## 2025-07-27 PROCEDURE — 3074F SYST BP LT 130 MM HG: CPT | Performed by: PHYSICIAN ASSISTANT

## 2025-07-27 RX ORDER — NITROFURANTOIN 25; 75 MG/1; MG/1
100 CAPSULE ORAL 2 TIMES DAILY
Qty: 14 CAPSULE | Refills: 0 | Status: SHIPPED | OUTPATIENT
Start: 2025-07-27 | End: 2025-08-03

## 2025-07-27 ASSESSMENT — ENCOUNTER SYMPTOMS
FATIGUE: 0
RESPIRATORY NEGATIVE: 1
POLYDIPSIA: 0
VOMITING: 0
CARDIOVASCULAR NEGATIVE: 1
FLANK PAIN: 0
SORE THROAT: 0
RHINORRHEA: 0
DIARRHEA: 0
CONSTITUTIONAL NEGATIVE: 1
SHORTNESS OF BREATH: 0
DYSURIA: 0
NECK STIFFNESS: 0
DIZZINESS: 0
PALPITATIONS: 0
HEADACHES: 0
MUSCULOSKELETAL NEGATIVE: 1
COUGH: 0
MYALGIAS: 0
ACTIVITY CHANGE: 0
ABDOMINAL PAIN: 0
ADENOPATHY: 0
WEAKNESS: 0
GASTROINTESTINAL NEGATIVE: 1
FREQUENCY: 1
HEMATURIA: 0
FEVER: 0
LIGHT-HEADEDNESS: 0
NEUROLOGICAL NEGATIVE: 1
NECK PAIN: 0
ENDOCRINE NEGATIVE: 1
CHILLS: 0
NAUSEA: 0
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
## 2025-07-27 NOTE — PROGRESS NOTES
Urgent Care Clinic Visit    Chief Complaint   Patient presents with    Urinary Problem     Pt presents for abdominal pain and urinary urgency for 1 week.                7/27/2025    12:21 PM   Additional Questions   Roomed by GIANNI Marlow   Accompanied by , Franklin     Pre-Provider Visit Orders- Urinalysis UA/UC  Patient reports the following symptoms:  possible urinary tract infection (UTI)   Does the patient report any of the following symptoms: vaginal discharge, vaginal itching, possible yeast infection, has a urinary catheter in place, or unable to void in a specimen cup?  No

## 2025-07-29 LAB — BACTERIA UR CULT: ABNORMAL

## 2025-08-04 ENCOUNTER — PATIENT OUTREACH (OUTPATIENT)
Dept: CARE COORDINATION | Facility: CLINIC | Age: 30
End: 2025-08-04
Payer: COMMERCIAL

## 2025-08-25 ENCOUNTER — OFFICE VISIT (OUTPATIENT)
Dept: FAMILY MEDICINE | Facility: CLINIC | Age: 30
End: 2025-08-25
Payer: COMMERCIAL

## 2025-08-25 VITALS
TEMPERATURE: 97.8 F | RESPIRATION RATE: 14 BRPM | HEART RATE: 107 BPM | OXYGEN SATURATION: 100 % | HEIGHT: 60 IN | BODY MASS INDEX: 24.15 KG/M2 | WEIGHT: 123 LBS | DIASTOLIC BLOOD PRESSURE: 81 MMHG | SYSTOLIC BLOOD PRESSURE: 118 MMHG
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
## 2025-08-25 DIAGNOSIS — I95.1 ORTHOSTATIC HYPOTENSION: ICD-10-CM

## 2025-08-25 DIAGNOSIS — G43.109 MIGRAINE WITH AURA AND WITHOUT STATUS MIGRAINOSUS, NOT INTRACTABLE: ICD-10-CM

## 2025-08-25 DIAGNOSIS — S06.0X0A CONCUSSION WITHOUT LOSS OF CONSCIOUSNESS, INITIAL ENCOUNTER: ICD-10-CM

## 2025-08-25 DIAGNOSIS — R55 SYNCOPE, UNSPECIFIED SYNCOPE TYPE: Primary | ICD-10-CM

## 2025-08-25 DIAGNOSIS — R00.0 TACHYCARDIA: ICD-10-CM

## 2025-08-25 DIAGNOSIS — M54.2 NECK PAIN: ICD-10-CM

## 2025-08-25 PROCEDURE — 1126F AMNT PAIN NOTED NONE PRSNT: CPT | Performed by: PHYSICIAN ASSISTANT

## 2025-08-25 PROCEDURE — 3074F SYST BP LT 130 MM HG: CPT | Performed by: PHYSICIAN ASSISTANT

## 2025-08-25 PROCEDURE — 3079F DIAST BP 80-89 MM HG: CPT | Performed by: PHYSICIAN ASSISTANT

## 2025-08-25 PROCEDURE — G2211 COMPLEX E/M VISIT ADD ON: HCPCS | Performed by: PHYSICIAN ASSISTANT

## 2025-08-25 PROCEDURE — 99213 OFFICE O/P EST LOW 20 MIN: CPT | Performed by: PHYSICIAN ASSISTANT

## 2025-08-25 PROCEDURE — 93242 EXT ECG>48HR<7D RECORDING: CPT | Performed by: PHYSICIAN ASSISTANT

## 2025-08-25 RX ORDER — LAMOTRIGINE 100 MG/1
100 TABLET ORAL DAILY
COMMUNITY

## 2025-08-25 ASSESSMENT — ANXIETY QUESTIONNAIRES
GAD7 TOTAL SCORE: 3
5. BEING SO RESTLESS THAT IT IS HARD TO SIT STILL: NOT AT ALL
8. IF YOU CHECKED OFF ANY PROBLEMS, HOW DIFFICULT HAVE THESE MADE IT FOR YOU TO DO YOUR WORK, TAKE CARE OF THINGS AT HOME, OR GET ALONG WITH OTHER PEOPLE?: NOT DIFFICULT AT ALL
2. NOT BEING ABLE TO STOP OR CONTROL WORRYING: SEVERAL DAYS
4. TROUBLE RELAXING: SEVERAL DAYS
1. FEELING NERVOUS, ANXIOUS, OR ON EDGE: NOT AT ALL
GAD7 TOTAL SCORE: 3
GAD7 TOTAL SCORE: 3
6. BECOMING EASILY ANNOYED OR IRRITABLE: NOT AT ALL
7. FEELING AFRAID AS IF SOMETHING AWFUL MIGHT HAPPEN: SEVERAL DAYS
3. WORRYING TOO MUCH ABOUT DIFFERENT THINGS: NOT AT ALL
IF YOU CHECKED OFF ANY PROBLEMS ON THIS QUESTIONNAIRE, HOW DIFFICULT HAVE THESE PROBLEMS MADE IT FOR YOU TO DO YOUR WORK, TAKE CARE OF THINGS AT HOME, OR GET ALONG WITH OTHER PEOPLE: NOT DIFFICULT AT ALL
7. FEELING AFRAID AS IF SOMETHING AWFUL MIGHT HAPPEN: SEVERAL DAYS

## 2025-08-25 ASSESSMENT — PATIENT HEALTH QUESTIONNAIRE - PHQ9
SUM OF ALL RESPONSES TO PHQ QUESTIONS 1-9: 6
10. IF YOU CHECKED OFF ANY PROBLEMS, HOW DIFFICULT HAVE THESE PROBLEMS MADE IT FOR YOU TO DO YOUR WORK, TAKE CARE OF THINGS AT HOME, OR GET ALONG WITH OTHER PEOPLE: NOT DIFFICULT AT ALL
SUM OF ALL RESPONSES TO PHQ QUESTIONS 1-9: 6

## 2025-08-25 ASSESSMENT — PAIN SCALES - GENERAL: PAINLEVEL_OUTOF10: NO PAIN (0)

## 2025-08-26 ENCOUNTER — PATIENT OUTREACH (OUTPATIENT)
Dept: CARE COORDINATION | Facility: CLINIC | Age: 30
End: 2025-08-26
Payer: COMMERCIAL

## 2025-08-28 ENCOUNTER — PATIENT OUTREACH (OUTPATIENT)
Dept: CARE COORDINATION | Facility: CLINIC | Age: 30
End: 2025-08-28
Payer: COMMERCIAL

## (undated) DEVICE — LIFTER SURGICAL ASCENDO SUBMUCOSAL LIFT AGENT BX00712934

## (undated) DEVICE — KIT ENDO FIRST STEP DISINFECTANT 200ML W/POUCH EP-4

## (undated) DEVICE — PREP CHLORAPREP 26ML TINTED ORANGE  260815

## (undated) DEVICE — PAD CHUX UNDERPAD 23X24" 7136

## (undated) RX ORDER — FENTANYL CITRATE 50 UG/ML
INJECTION, SOLUTION INTRAMUSCULAR; INTRAVENOUS
Status: DISPENSED
Start: 2025-02-11

## (undated) RX ORDER — ONDANSETRON 2 MG/ML
INJECTION INTRAMUSCULAR; INTRAVENOUS
Status: DISPENSED
Start: 2025-02-11